# Patient Record
Sex: FEMALE | Race: WHITE | NOT HISPANIC OR LATINO | Employment: UNEMPLOYED | URBAN - NONMETROPOLITAN AREA
[De-identification: names, ages, dates, MRNs, and addresses within clinical notes are randomized per-mention and may not be internally consistent; named-entity substitution may affect disease eponyms.]

---

## 2018-06-29 ENCOUNTER — OFFICE VISIT (OUTPATIENT)
Dept: INTERNAL MEDICINE | Facility: CLINIC | Age: 23
End: 2018-06-29

## 2018-06-29 VITALS
WEIGHT: 205 LBS | TEMPERATURE: 98.6 F | SYSTOLIC BLOOD PRESSURE: 118 MMHG | OXYGEN SATURATION: 99 % | RESPIRATION RATE: 12 BRPM | DIASTOLIC BLOOD PRESSURE: 70 MMHG | HEIGHT: 62 IN | BODY MASS INDEX: 37.73 KG/M2 | HEART RATE: 70 BPM

## 2018-06-29 DIAGNOSIS — Z82.61 FAMILY HISTORY OF RHEUMATOID ARTHRITIS: ICD-10-CM

## 2018-06-29 DIAGNOSIS — N92.6 ABNORMAL MENSES: ICD-10-CM

## 2018-06-29 DIAGNOSIS — L40.9 PSORIASIS: ICD-10-CM

## 2018-06-29 DIAGNOSIS — Z83.49: ICD-10-CM

## 2018-06-29 DIAGNOSIS — R14.0 BLOATING: ICD-10-CM

## 2018-06-29 DIAGNOSIS — E28.2 PCOS (POLYCYSTIC OVARIAN SYNDROME): ICD-10-CM

## 2018-06-29 DIAGNOSIS — R63.5 WEIGHT GAIN: Primary | ICD-10-CM

## 2018-06-29 DIAGNOSIS — M79.10 MYALGIA: ICD-10-CM

## 2018-06-29 DIAGNOSIS — R53.83 FATIGUE, UNSPECIFIED TYPE: ICD-10-CM

## 2018-06-29 PROCEDURE — 99204 OFFICE O/P NEW MOD 45 MIN: CPT | Performed by: FAMILY MEDICINE

## 2018-07-02 LAB
25(OH)D3+25(OH)D2 SERPL-MCNC: 37.5 NG/ML
ALBUMIN SERPL-MCNC: 4.7 G/DL (ref 3.5–5)
ALBUMIN/GLOB SERPL: 1.6 G/DL (ref 1–2)
ALP SERPL-CCNC: 84 U/L (ref 38–126)
ALT SERPL-CCNC: 33 U/L (ref 13–69)
AST SERPL-CCNC: 23 U/L (ref 15–46)
BASOPHILS # BLD AUTO: 0.04 10*3/MM3 (ref 0–0.2)
BASOPHILS NFR BLD AUTO: 0.3 % (ref 0–2.5)
BILIRUB SERPL-MCNC: 0.5 MG/DL (ref 0.2–1.3)
BUN SERPL-MCNC: 22 MG/DL (ref 7–20)
BUN/CREAT SERPL: 36.7 (ref 7.1–23.5)
CALCIUM SERPL-MCNC: 10 MG/DL (ref 8.4–10.2)
CENTROMERE B AB SER-ACNC: <0.2 AI (ref 0–0.9)
CHLORIDE SERPL-SCNC: 103 MMOL/L (ref 98–107)
CHROMATIN AB SERPL-ACNC: <0.2 AI (ref 0–0.9)
CO2 SERPL-SCNC: 29 MMOL/L (ref 26–30)
CREAT SERPL-MCNC: 0.6 MG/DL (ref 0.6–1.3)
CRP SERPL-MCNC: <0.5 MG/DL (ref 0–1)
DSDNA AB SER-ACNC: <1 IU/ML (ref 0–9)
ENA JO1 AB SER-ACNC: <0.2 AI (ref 0–0.9)
ENA RNP AB SER-ACNC: 0.3 AI (ref 0–0.9)
ENA SCL70 AB SER-ACNC: <0.2 AI (ref 0–0.9)
ENA SM AB SER-ACNC: <0.2 AI (ref 0–0.9)
ENA SM+RNP AB SER-ACNC: <0.2 AI (ref 0–0.9)
ENA SS-A AB SER-ACNC: <0.2 AI (ref 0–0.9)
ENA SS-B AB SER-ACNC: <0.2 AI (ref 0–0.9)
EOSINOPHIL # BLD AUTO: 0.43 10*3/MM3 (ref 0–0.7)
EOSINOPHIL NFR BLD AUTO: 3.4 % (ref 0–7)
ERYTHROCYTE [DISTWIDTH] IN BLOOD BY AUTOMATED COUNT: 12.4 % (ref 11.5–14.5)
ERYTHROCYTE [SEDIMENTATION RATE] IN BLOOD BY WESTERGREN METHOD: 5 MM/HR (ref 0–20)
FOLATE SERPL-MCNC: 10.9 NG/ML
GLOBULIN SER CALC-MCNC: 3 GM/DL
GLUCOSE SERPL-MCNC: 90 MG/DL (ref 74–98)
HBA1C MFR BLD: 5.4 %
HCT VFR BLD AUTO: 41 % (ref 37–47)
HGB BLD-MCNC: 14 G/DL (ref 12–16)
IMM GRANULOCYTES # BLD: 0.05 10*3/MM3 (ref 0–0.06)
IMM GRANULOCYTES NFR BLD: 0.4 % (ref 0–0.6)
IRON SATN MFR SERPL: 19 % (ref 11–46)
IRON SERPL-MCNC: 83 MCG/DL (ref 37–181)
LYMPHOCYTES # BLD AUTO: 3.06 10*3/MM3 (ref 0.6–3.4)
LYMPHOCYTES NFR BLD AUTO: 24 % (ref 10–50)
Lab: NORMAL
MCH RBC QN AUTO: 30.8 PG (ref 27–31)
MCHC RBC AUTO-ENTMCNC: 34.1 G/DL (ref 30–37)
MCV RBC AUTO: 90.1 FL (ref 81–99)
MONOCYTES # BLD AUTO: 0.73 10*3/MM3 (ref 0–0.9)
MONOCYTES NFR BLD AUTO: 5.7 % (ref 0–12)
NEUTROPHILS # BLD AUTO: 8.45 10*3/MM3 (ref 2–6.9)
NEUTROPHILS NFR BLD AUTO: 66.2 % (ref 37–80)
NRBC BLD AUTO-RTO: 0 /100 WBC (ref 0–0)
PLATELET # BLD AUTO: 291 10*3/MM3 (ref 130–400)
POTASSIUM SERPL-SCNC: 4.7 MMOL/L (ref 3.5–5.1)
PROT SERPL-MCNC: 7.7 G/DL (ref 6.3–8.2)
RBC # BLD AUTO: 4.55 10*6/MM3 (ref 4.2–5.4)
RHEUMATOID FACT SERPL-ACNC: <10 IU/ML (ref 0–13.9)
RIBOSOMAL P AB SER-ACNC: <0.2 AI (ref 0–0.9)
SODIUM SERPL-SCNC: 141 MMOL/L (ref 137–145)
T4 FREE SERPL-MCNC: 0.94 NG/DL (ref 0.78–2.19)
TESTOST SERPL-MCNC: 39.8 NG/DL (ref 10–55)
THYROGLOB AB SERPL-ACNC: <1 IU/ML (ref 0–0.9)
THYROPEROXIDASE AB SERPL-ACNC: 15 IU/ML (ref 0–34)
TIBC SERPL-MCNC: 428 MCG/DL (ref 261–497)
TSH SERPL DL<=0.005 MIU/L-ACNC: 0.94 MIU/ML (ref 0.47–4.68)
UIBC SERPL-MCNC: 345 MCG/DL
VIT B12 SERPL-MCNC: 359 PG/ML (ref 239–931)
WBC # BLD AUTO: 12.76 10*3/MM3 (ref 4.8–10.8)

## 2018-07-05 ENCOUNTER — TELEPHONE (OUTPATIENT)
Dept: INTERNAL MEDICINE | Facility: CLINIC | Age: 23
End: 2018-07-05

## 2018-07-05 DIAGNOSIS — L40.9 PSORIASIS: Primary | ICD-10-CM

## 2018-07-05 DIAGNOSIS — M79.10 MYALGIA: ICD-10-CM

## 2018-07-05 DIAGNOSIS — Z82.61 FAMILY HISTORY OF RHEUMATOID ARTHRITIS: ICD-10-CM

## 2019-10-28 LAB — EXTERNAL GROUP B STREP ANTIGEN: NEGATIVE

## 2019-11-13 ENCOUNTER — HOSPITAL ENCOUNTER (OUTPATIENT)
Facility: HOSPITAL | Age: 24
Setting detail: OBSERVATION
Discharge: HOME | End: 2019-11-14
Attending: OBSTETRICS & GYNECOLOGY | Admitting: NURSE PRACTITIONER

## 2019-11-13 PROBLEM — Z34.90 PATIENT CURRENTLY PREGNANT: Status: ACTIVE | Noted: 2019-11-13

## 2019-11-13 LAB
ABO GROUP BLD: NORMAL
BLD GP AB SCN SERPL QL: NEGATIVE
DEPRECATED RDW RBC AUTO: 46.4 FL (ref 37–54)
ERYTHROCYTE [DISTWIDTH] IN BLOOD BY AUTOMATED COUNT: 13.8 % (ref 12.3–15.4)
HCT VFR BLD AUTO: 37.3 % (ref 34–46.6)
HGB BLD-MCNC: 12.4 G/DL (ref 12–15.9)
MCH RBC QN AUTO: 30.5 PG (ref 26.6–33)
MCHC RBC AUTO-ENTMCNC: 33.2 G/DL (ref 31.5–35.7)
MCV RBC AUTO: 91.9 FL (ref 79–97)
PLATELET # BLD AUTO: 257 10*3/MM3 (ref 140–450)
PMV BLD AUTO: 10.6 FL (ref 6–12)
RBC # BLD AUTO: 4.06 10*6/MM3 (ref 3.77–5.28)
RH BLD: POSITIVE
T&S EXPIRATION DATE: NORMAL
WBC NRBC COR # BLD: 12.79 10*3/MM3 (ref 3.4–10.8)

## 2019-11-13 PROCEDURE — 86901 BLOOD TYPING SEROLOGIC RH(D): CPT

## 2019-11-13 PROCEDURE — G0378 HOSPITAL OBSERVATION PER HR: HCPCS

## 2019-11-13 PROCEDURE — 59025 FETAL NON-STRESS TEST: CPT

## 2019-11-13 PROCEDURE — 86850 RBC ANTIBODY SCREEN: CPT | Performed by: NURSE PRACTITIONER

## 2019-11-13 PROCEDURE — 96361 HYDRATE IV INFUSION ADD-ON: CPT

## 2019-11-13 PROCEDURE — 86901 BLOOD TYPING SEROLOGIC RH(D): CPT | Performed by: NURSE PRACTITIONER

## 2019-11-13 PROCEDURE — 85027 COMPLETE CBC AUTOMATED: CPT | Performed by: NURSE PRACTITIONER

## 2019-11-13 PROCEDURE — 86900 BLOOD TYPING SEROLOGIC ABO: CPT | Performed by: NURSE PRACTITIONER

## 2019-11-13 PROCEDURE — 86900 BLOOD TYPING SEROLOGIC ABO: CPT

## 2019-11-13 RX ORDER — CARBOPROST TROMETHAMINE 250 UG/ML
250 INJECTION, SOLUTION INTRAMUSCULAR AS NEEDED
Status: CANCELLED | OUTPATIENT
Start: 2019-11-13

## 2019-11-13 RX ORDER — PROMETHAZINE HYDROCHLORIDE 25 MG/ML
12.5 INJECTION, SOLUTION INTRAMUSCULAR; INTRAVENOUS EVERY 6 HOURS PRN
Status: DISCONTINUED | OUTPATIENT
Start: 2019-11-13 | End: 2019-11-14 | Stop reason: HOSPADM

## 2019-11-13 RX ORDER — OXYTOCIN-SODIUM CHLORIDE 0.9% IV SOLN 30 UNIT/500ML 30-0.9/5 UT/ML-%
85 SOLUTION INTRAVENOUS ONCE
Status: CANCELLED | OUTPATIENT
Start: 2019-11-13 | End: 2019-11-13

## 2019-11-13 RX ORDER — ONDANSETRON 4 MG/1
4 TABLET, FILM COATED ORAL EVERY 6 HOURS PRN
Status: DISCONTINUED | OUTPATIENT
Start: 2019-11-13 | End: 2019-11-14 | Stop reason: HOSPADM

## 2019-11-13 RX ORDER — MAGNESIUM CARB/ALUMINUM HYDROX 105-160MG
30 TABLET,CHEWABLE ORAL ONCE
Status: DISCONTINUED | OUTPATIENT
Start: 2019-11-13 | End: 2019-11-14 | Stop reason: HOSPADM

## 2019-11-13 RX ORDER — PRENATAL WITH FERROUS FUM AND FOLIC ACID 3080; 920; 120; 400; 22; 1.84; 3; 20; 10; 1; 12; 200; 27; 25; 2 [IU]/1; [IU]/1; MG/1; [IU]/1; MG/1; MG/1; MG/1; MG/1; MG/1; MG/1; UG/1; MG/1; MG/1; MG/1; MG/1
TABLET ORAL DAILY
COMMUNITY
End: 2021-04-27

## 2019-11-13 RX ORDER — OXYTOCIN-SODIUM CHLORIDE 0.9% IV SOLN 30 UNIT/500ML 30-0.9/5 UT/ML-%
650 SOLUTION INTRAVENOUS ONCE
Status: CANCELLED | OUTPATIENT
Start: 2019-11-13 | End: 2019-11-13

## 2019-11-13 RX ORDER — PROMETHAZINE HYDROCHLORIDE 12.5 MG/1
12.5 TABLET ORAL EVERY 6 HOURS PRN
Status: DISCONTINUED | OUTPATIENT
Start: 2019-11-13 | End: 2019-11-14 | Stop reason: HOSPADM

## 2019-11-13 RX ORDER — PROMETHAZINE HYDROCHLORIDE 12.5 MG/1
12.5 SUPPOSITORY RECTAL EVERY 6 HOURS PRN
Status: DISCONTINUED | OUTPATIENT
Start: 2019-11-13 | End: 2019-11-14 | Stop reason: HOSPADM

## 2019-11-13 RX ORDER — ONDANSETRON 2 MG/ML
4 INJECTION INTRAMUSCULAR; INTRAVENOUS EVERY 6 HOURS PRN
Status: DISCONTINUED | OUTPATIENT
Start: 2019-11-13 | End: 2019-11-14 | Stop reason: HOSPADM

## 2019-11-13 RX ORDER — ACETAMINOPHEN 325 MG/1
650 TABLET ORAL EVERY 4 HOURS PRN
Status: DISCONTINUED | OUTPATIENT
Start: 2019-11-13 | End: 2019-11-14 | Stop reason: HOSPADM

## 2019-11-13 RX ORDER — MISOPROSTOL 200 UG/1
800 TABLET ORAL AS NEEDED
Status: CANCELLED | OUTPATIENT
Start: 2019-11-13

## 2019-11-13 RX ORDER — BUTORPHANOL TARTRATE 1 MG/ML
1 INJECTION, SOLUTION INTRAMUSCULAR; INTRAVENOUS
Status: DISCONTINUED | OUTPATIENT
Start: 2019-11-13 | End: 2019-11-14 | Stop reason: HOSPADM

## 2019-11-13 RX ORDER — SODIUM CHLORIDE 0.9 % (FLUSH) 0.9 %
10 SYRINGE (ML) INJECTION AS NEEDED
Status: DISCONTINUED | OUTPATIENT
Start: 2019-11-13 | End: 2019-11-14 | Stop reason: HOSPADM

## 2019-11-13 RX ORDER — SODIUM CHLORIDE 0.9 % (FLUSH) 0.9 %
3 SYRINGE (ML) INJECTION EVERY 12 HOURS SCHEDULED
Status: DISCONTINUED | OUTPATIENT
Start: 2019-11-13 | End: 2019-11-14 | Stop reason: HOSPADM

## 2019-11-13 RX ORDER — IBUPROFEN 600 MG/1
600 TABLET ORAL EVERY 6 HOURS PRN
Status: CANCELLED | OUTPATIENT
Start: 2019-11-13

## 2019-11-13 RX ORDER — METHYLERGONOVINE MALEATE 0.2 MG/ML
200 INJECTION INTRAVENOUS ONCE AS NEEDED
Status: CANCELLED | OUTPATIENT
Start: 2019-11-13

## 2019-11-13 RX ORDER — LIDOCAINE HYDROCHLORIDE 10 MG/ML
5 INJECTION, SOLUTION EPIDURAL; INFILTRATION; INTRACAUDAL; PERINEURAL AS NEEDED
Status: DISCONTINUED | OUTPATIENT
Start: 2019-11-13 | End: 2019-11-14 | Stop reason: HOSPADM

## 2019-11-13 RX ORDER — SODIUM CHLORIDE, SODIUM LACTATE, POTASSIUM CHLORIDE, CALCIUM CHLORIDE 600; 310; 30; 20 MG/100ML; MG/100ML; MG/100ML; MG/100ML
125 INJECTION, SOLUTION INTRAVENOUS CONTINUOUS
Status: DISCONTINUED | OUTPATIENT
Start: 2019-11-13 | End: 2019-11-14 | Stop reason: HOSPADM

## 2019-11-13 RX ORDER — TERBUTALINE SULFATE 1 MG/ML
0.25 INJECTION, SOLUTION SUBCUTANEOUS AS NEEDED
Status: DISCONTINUED | OUTPATIENT
Start: 2019-11-13 | End: 2019-11-14 | Stop reason: HOSPADM

## 2019-11-13 RX ADMIN — SODIUM CHLORIDE, POTASSIUM CHLORIDE, SODIUM LACTATE AND CALCIUM CHLORIDE 125 ML/HR: 600; 310; 30; 20 INJECTION, SOLUTION INTRAVENOUS at 18:31

## 2019-11-14 ENCOUNTER — HOSPITAL ENCOUNTER (OUTPATIENT)
Facility: HOSPITAL | Age: 24
End: 2019-11-14
Attending: OBSTETRICS & GYNECOLOGY | Admitting: OBSTETRICS & GYNECOLOGY

## 2019-11-14 VITALS
WEIGHT: 240 LBS | RESPIRATION RATE: 16 BRPM | TEMPERATURE: 98 F | SYSTOLIC BLOOD PRESSURE: 101 MMHG | BODY MASS INDEX: 44.16 KG/M2 | HEART RATE: 100 BPM | HEIGHT: 62 IN | DIASTOLIC BLOOD PRESSURE: 53 MMHG

## 2019-11-14 PROBLEM — Z34.90 PREGNANCY: Status: ACTIVE | Noted: 2019-11-14

## 2019-11-14 PROCEDURE — 96361 HYDRATE IV INFUSION ADD-ON: CPT

## 2019-11-14 PROCEDURE — 96374 THER/PROPH/DIAG INJ IV PUSH: CPT

## 2019-11-14 PROCEDURE — G0378 HOSPITAL OBSERVATION PER HR: HCPCS

## 2019-11-14 PROCEDURE — 59025 FETAL NON-STRESS TEST: CPT

## 2019-11-14 PROCEDURE — 96372 THER/PROPH/DIAG INJ SC/IM: CPT

## 2019-11-14 PROCEDURE — 25010000002 MORPHINE PER 10 MG: Performed by: NURSE PRACTITIONER

## 2019-11-14 PROCEDURE — 25010000002 PROMETHAZINE PER 50 MG: Performed by: NURSE PRACTITIONER

## 2019-11-14 RX ORDER — PROMETHAZINE HYDROCHLORIDE 25 MG/ML
12.5 INJECTION, SOLUTION INTRAMUSCULAR; INTRAVENOUS EVERY 6 HOURS PRN
Status: DISCONTINUED | OUTPATIENT
Start: 2019-11-14 | End: 2019-11-14 | Stop reason: SDUPTHER

## 2019-11-14 RX ORDER — MORPHINE SULFATE 10 MG/ML
10 INJECTION INTRAMUSCULAR; INTRAVENOUS; SUBCUTANEOUS ONCE
Status: COMPLETED | OUTPATIENT
Start: 2019-11-14 | End: 2019-11-14

## 2019-11-14 RX ADMIN — PROMETHAZINE HYDROCHLORIDE 12.5 MG: 25 INJECTION INTRAMUSCULAR; INTRAVENOUS at 01:09

## 2019-11-14 RX ADMIN — MORPHINE SULFATE 10 MG: 10 INJECTION INTRAVENOUS at 01:41

## 2019-11-14 RX ADMIN — SODIUM CHLORIDE, POTASSIUM CHLORIDE, SODIUM LACTATE AND CALCIUM CHLORIDE 125 ML/HR: 600; 310; 30; 20 INJECTION, SOLUTION INTRAVENOUS at 01:50

## 2019-11-14 NOTE — PROGRESS NOTES
[facility]  Obstetric Progress Note    Subjective     Patient:    Resting without complaints  States that contractions have decreased in frequency and intensity  Objective     Vital Signs Range for the last 24 hours  Temp:  [97.8 °F (36.6 °C)-97.9 °F (36.6 °C)] 97.8 °F (36.6 °C)   Temp src: Oral   BP: (103-122)/(68-77) 122/74   Heart Rate:  [71-88] 75   Resp:  [18-20] 18               Weight:  [109 kg (240 lb)] 109 kg (240 lb)       Intake/Output this shift:    No intake/output data recorded.    Physical Exam:      Abdomen Abdominal exam: soft, nontender, nondistended, no masses or organomegaly.   Extremities Exam of extremities: peripheral pulses normal, no pedal edema, no clubbing or cyanosis     Presentation: vertex   Cervix: Exam by: Method: sterile exam per CNM   Dilation:   3   Effacement: Cervical Effacement: 80%   Station:   -2, [facility]         Fetal Heart Rate Assessment   Method: Fetal HR Assessment Method: external   Beats/min: Fetal HR (beats/min): 120   Baseline: Fetal Heart Baseline Rate: normal range   Varibility: Fetal HR Variability: moderate (amplitude range 6 to 25 bpm)   Accels: Fetal HR Accelerations: greater than/equal to 15 bpm   Decels: Fetal HR Decelerations: absent   Tracing Category:       Uterine Assessment   Method: Method: external tocotransducer   Frequency (min): Contraction Frequency (Minutes): 8-9   Ctx Count in 10 min:     Duration:     Intensity: Contraction Intensity: mild by palpation   Intensity by IUPC:     Resting Tone: Uterine Resting Tone: soft by palpation   Resting Tone by IUPC:     [place] Units:         Assessment/Plan       Patient currently pregnant        Assessment:  1.  Intrauterine pregnancy at 38w5d weeks gestation with reactive fetal status.    2.  Early labor versus false labor  3.  Obstetrical history significant for previous  section.  4.  GBS status: No results found for: GBSANTIGEN    Plan:  1. Discussed that since her contractions have decreased  and since she has not had cervical change since admission then augmentation is not indicated prior to 39 weeks.  Will continue observation  2.  Offered therapeutic rest  3.   Plan of care has been reviewed with patient and she verbalizes understanding  4.  Risks, benefits of treatment plan have been discussed.  5.  All questions have been answered.        Rosita Taylor CNM  11/14/2019  1:05 AM

## 2019-11-14 NOTE — H&P
[facility]  Obstetric History and Physical    Chief Complaint   Patient presents with   • Contractions       Subjective     Patient is a 24 y.o. female  currently at 38w4d, who presents with labor.    Her prenatal care is benign.  Her previous obstetric/gynecological history is noted for previous  for placental abruption at 34 weeks.  She has had two uncomplicated VBACs in 2016 since  in      The following portions of the patients history were reviewed and updated as appropriate: current medications, allergies, past medical history, past surgical history, past family history, past social history and problem list .       Prenatal Information:  Prenatal Results     Initial Prenatal Labs     Test Value Reference Range Date Time    Hemoglobin        Hematocrit        Platelets 257 10*3/mm3 140 - 450 10*3/mm3 19    Rubella IgG        Hepatitis B SAg        Hepatitis C Ab        RPR        ABO O   19    Rh Positive   19    Antibody Screen        HIV        Urine Culture        Gonorrhea        Chlamydia        TSH 0.937 mIU/mL 0.470 - 4.680 mIU/mL 18 1120          2nd and 3rd Trimester     Test Value Reference Range Date Time    Hemoglobin (repeated) 12.4 g/dL 12.0 - 15.9 g/dL 19    Hematocrit (repeated) 37.3 % 34.0 - 46.6 % 19    GCT        Antibody Screen (repeated) Negative   19    GTT Fasting        GTT 1 Hr        GTT 2 Hr        GTT 3 Hr        Group B Strep Negative   10/28/19           Drug Screening     Test Value Reference Range Date Time    Amphetamine Screen        Barbiturate Screen        Benzodiazepine Screen        Methadone Screen        Phencyclidine Screen        Opiates Screen        THC Screen        Cocaine Screen        Propoxyphene Screen        Buprenorphine Screen        Methamphetamine Screen        Oxycodone Screen        Tricyclic Antidepressants Screen              Other (Risk screening)      Test Value Reference Range Date Time    Varicella IgG        Parvovirus IgG        CMV IgG        Cystic Fibrosis        Hemoglobin electrophoresis        NIPT        MSAFP-4        AFP (for NTD only)                  External Prenatal Results     Pregnancy Outside Results - Transcribed From Office Records - See Scanned Records For Details     Test Value Date Time    Hgb 12.4 g/dL 19 184    Hct 37.3 % 19    ABO O  19    Rh Positive  19    Antibody Screen Negative  19    Glucose Fasting GTT       Glucose Tolerance Test 1 hour       Glucose Tolerance Test 3 hour       Gonorrhea (discrete)       Chlamydia (discrete)       RPR       VDRL       Syphilis Antibody       Rubella       HBsAg       Herpes Simplex Virus PCR       Herpes Simplex VIrus Culture       HIV       Hep C RNA Quant PCR       Hep C Antibody       AFP       Group B Strep Negative  10/28/19     GBS Susceptibility to Clindamycin       GBS Susceptibility to Erythromycin       Fetal Fibronectin       Genetic Testing, Maternal Blood             Drug Screening     Test Value Date Time    Urine Drug Screen       Amphetamine Screen       Barbiturate Screen       Benzodiazepine Screen       Methadone Screen       Phencyclidine Screen       Opiates Screen       THC Screen       Cocaine Screen       Propoxyphene Screen       Buprenorphine Screen       Methamphetamine Screen       Oxycodone Screen       Tricyclic Antidepressants Screen                    Past OB History:     Obstetric History       T2      L3     SAB1   TAB0   Ectopic0   Molar0   Multiple0   Live Births3       # Outcome Date GA Lbr Aniceto/2nd Weight Sex Delivery Anes PTL Lv   9 Current            8 AB 18 8w0d          7 Term 16 37w0d   F    BAY   6 Term 16 40w0d   M    BAY   5 AB 01/01/15 8w0d          4  14 34w0d   M CS-LTranv   BAY   3 AB 14 8w0d          2 AB 13 8w0d          1 SAB  11 20w0d    Vag-Spont   ND          Past Medical History: Past Medical History:   Diagnosis Date   • Gestational diabetes     previous pregnancy   • Obesity    • PCOS (polycystic ovarian syndrome)       Past Surgical History Past Surgical History:   Procedure Laterality Date   •  SECTION  2014   • CHOLECYSTECTOMY  01/15/2018   • TONSILLECTOMY        Family History: History reviewed. No pertinent family history.   Social History:  reports that she has been smoking.  She has never used smokeless tobacco.   reports that she does not drink alcohol.   reports that she does not use drugs.        Review of Systems   Constitutional: Negative.    HENT: Negative.    Eyes: Negative.    Respiratory: Negative.    Cardiovascular: Negative.    Gastrointestinal: Negative.    Endocrine: Negative.    Genitourinary: Negative.    Musculoskeletal: Negative.    Skin: Negative.    Allergic/Immunologic: Negative.    Neurological: Negative.    Hematological: Negative.    Psychiatric/Behavioral: Negative.          Objective     Vital Signs Range for the last 24 hours  Temperature: Temp:  [97.8 °F (36.6 °C)-97.9 °F (36.6 °C)] 97.8 °F (36.6 °C)   Temp Source: Temp src: Oral   BP: BP: (103-113)/(68-77) 113/77   Pulse: Heart Rate:  [71-88] 71   Respirations: Resp:  [20] 20   SPO2:     O2 Amount (l/min):     O2 Devices     Weight: Weight:  [109 kg (240 lb)] 109 kg (240 lb)     Physical Examination: General appearance - alert, well appearing, and in no distress  Neck - supple, no significant adenopathy  Chest - clear to auscultation, no wheezes, rales or rhonchi, symmetric air entry  Heart - normal rate, regular rhythm, normal S1, S2, no murmurs, rubs, clicks or gallops  Abdomen - soft, nontender, nondistended, no masses or organomegaly  Extremities - peripheral pulses normal, no pedal edema, no clubbing or cyanosis    Presentation: vertex   Cervix: Exam by:   RN   Dilation: Cervical Dilation (cm): 3   Effacement: Cervical  Effacement: 80-90%   Station:       Fetal Heart Rate Assessment   Method: Fetal HR Assessment Method: external   Beats/min: Fetal HR (beats/min): 120   Baseline: Fetal Heart Baseline Rate: normal range   Variability: Fetal HR Variability: moderate (amplitude range 6 to 25 bpm)   Accels: Fetal HR Accelerations: greater than/equal to 15 bpm   Decels: Fetal HR Decelerations: absent   Tracing Category:       Uterine Assessment   Method: Method: external tocotransducer   Frequency (min): Contraction Frequency (Minutes): 3-6   Ctx Count in 10 min:     Duration:     Intensity: Contraction Intensity: moderate by palpation   Intensity by IUPC:     Resting Tone: Uterine Resting Tone: soft by palpation   Resting Tone by IUPC:     [place] Units:         Assessment/Plan       Patient currently pregnant      Assessment & Plan    Assessment:  1.  Intrauterine pregnancy at 38w4d gestation with reactive fetal status.    2.  labor  without ROM  3.  Obstetrical history significant for previous  section with successful  x 2  4.  GBS status:   External Strep Group B Ag   Date Value Ref Range Status   10/28/2019 Negative  Final       Plan:  1. expectant management. Patient refuses augmentation until after midnight at which point she desires AROM  2. Plan of care has been reviewed with patient and she verbalizes understanding  3.  Risks, benefits of treatment plan have been discussed.  4.  All questions have been answered.  5.  Epidural as desires      Rosita Taylor CNM  2019  11:12 PM

## 2019-11-14 NOTE — PROGRESS NOTES
[facility]  Obstetric Progress Note    Subjective     Patient:    Patient is frustrated as contractions have spaced out. Denies vaginal bleeding or loss of fluid. Reports good fetal movement.     Objective     Vital Signs Range for the last 24 hours  Temp:  [97.8 °F (36.6 °C)-98 °F (36.7 °C)] 98 °F (36.7 °C)   Temp src: Oral   BP: (101-122)/(53-77) 101/53   Heart Rate:  [] 100   Resp:  [16-20] 16               Weight:  [109 kg (240 lb)] 109 kg (240 lb)       Intake/Output this shift:    No intake/output data recorded.    Physical Exam:      Abdomen Abdominal exam: soft, nontender, nondistended, no masses or organomegaly.   Extremities Exam of extremities: peripheral pulses normal, no pedal edema, no clubbing or cyanosis     Presentation: vertex   Cervix: Exam by: Method: sterile exam per CNM   Dilation:  3   Effacement: Cervical Effacement: 80%   Station:  -1         Fetal Heart Rate Assessment   Method: Fetal HR Assessment Method: external   Beats/min: Fetal HR (beats/min): 130   Baseline: Fetal Heart Baseline Rate: normal range   Varibility: Fetal HR Variability: moderate (amplitude range 6 to 25 bpm)   Accels: Fetal HR Accelerations: greater than/equal to 15 bpm, lasting at least 15 seconds   Decels: Fetal HR Decelerations: variable   Tracing Category:       Uterine Assessment   Method: Method: external tocotransducer   Frequency (min): Contraction Frequency (Minutes): x2   Ctx Count in 10 min:     Duration:     Intensity: Contraction Intensity: no contractions   Intensity by IUPC:     Resting Tone: Uterine Resting Tone: soft by palpation   Resting Tone by IUPC:     [place] Units:         Assessment/Plan       Patient currently pregnant        Assessment:  1.  Intrauterine pregnancy at 38w5d weeks gestation with reactive fetal status.    2.  Contractions have spaced out overnight, patient now feeling rare contractions    Plan:  1. Discharge home with labor precautions, keep scheduled appt for Monday or  rtc sooner if indicated  2.   Plan of care has been reviewed with patient and family  3.  Risks, benefits of treatment plan have been discussed.  4.  All questions have been answered.        Thao Robin CNM  11/14/2019  8:40 AM

## 2019-11-14 NOTE — PLAN OF CARE
Problem: Labor (Cervical Ripen, Induct, Augment) (Adult,Obstetrics,Pediatric)  Goal: Signs and Symptoms of Listed Potential Problems Will be Absent, Minimized or Managed (Labor)  Outcome: Ongoing (interventions implemented as appropriate)   11/14/19 7445   Goal/Outcome Evaluation   Problems Assessed (Labor) all   Problems Present (Labor) none

## 2019-11-18 ENCOUNTER — HOSPITAL ENCOUNTER (INPATIENT)
Dept: LABOR AND DELIVERY | Facility: HOSPITAL | Age: 24
LOS: 3 days | Discharge: HOME | End: 2019-11-21
Attending: OBSTETRICS & GYNECOLOGY | Admitting: OBSTETRICS & GYNECOLOGY

## 2019-11-18 PROBLEM — O34.219 HX SUCCESSFUL VBAC (VAGINAL BIRTH AFTER CESAREAN), CURRENTLY PREGNANT: Status: ACTIVE | Noted: 2019-11-18

## 2019-11-18 PROBLEM — Z3A.39 39 WEEKS GESTATION OF PREGNANCY: Status: ACTIVE | Noted: 2019-11-18

## 2019-11-18 PROBLEM — O34.219 DESIRES VBAC (VAGINAL BIRTH AFTER CESAREAN) TRIAL: Status: ACTIVE | Noted: 2019-11-18

## 2019-11-18 LAB
ALP SERPL-CCNC: 129 U/L (ref 39–117)
ALT SERPL W P-5'-P-CCNC: 10 U/L (ref 1–33)
AST SERPL-CCNC: 14 U/L (ref 1–32)
BILIRUB SERPL-MCNC: 0.2 MG/DL (ref 0.2–1.2)
CREAT BLD-MCNC: 0.53 MG/DL (ref 0.57–1)
DEPRECATED RDW RBC AUTO: 46.4 FL (ref 37–54)
ERYTHROCYTE [DISTWIDTH] IN BLOOD BY AUTOMATED COUNT: 13.8 % (ref 12.3–15.4)
HCT VFR BLD AUTO: 34.6 % (ref 34–46.6)
HGB BLD-MCNC: 11.4 G/DL (ref 12–15.9)
LDH SERPL-CCNC: 158 U/L (ref 135–214)
MCH RBC QN AUTO: 31 PG (ref 26.6–33)
MCHC RBC AUTO-ENTMCNC: 32.9 G/DL (ref 31.5–35.7)
MCV RBC AUTO: 94 FL (ref 79–97)
PLATELET # BLD AUTO: 249 10*3/MM3 (ref 140–450)
PMV BLD AUTO: 10.9 FL (ref 6–12)
RBC # BLD AUTO: 3.68 10*6/MM3 (ref 3.77–5.28)
URATE SERPL-MCNC: 6.6 MG/DL (ref 2.4–5.7)
WBC NRBC COR # BLD: 13.66 10*3/MM3 (ref 3.4–10.8)

## 2019-11-18 PROCEDURE — 59025 FETAL NON-STRESS TEST: CPT

## 2019-11-18 PROCEDURE — 82247 BILIRUBIN TOTAL: CPT | Performed by: OBSTETRICS & GYNECOLOGY

## 2019-11-18 PROCEDURE — 83615 LACTATE (LD) (LDH) ENZYME: CPT | Performed by: OBSTETRICS & GYNECOLOGY

## 2019-11-18 PROCEDURE — 86850 RBC ANTIBODY SCREEN: CPT | Performed by: OBSTETRICS & GYNECOLOGY

## 2019-11-18 PROCEDURE — 86900 BLOOD TYPING SEROLOGIC ABO: CPT | Performed by: OBSTETRICS & GYNECOLOGY

## 2019-11-18 PROCEDURE — 82565 ASSAY OF CREATININE: CPT | Performed by: OBSTETRICS & GYNECOLOGY

## 2019-11-18 PROCEDURE — 84450 TRANSFERASE (AST) (SGOT): CPT | Performed by: OBSTETRICS & GYNECOLOGY

## 2019-11-18 PROCEDURE — 84460 ALANINE AMINO (ALT) (SGPT): CPT | Performed by: OBSTETRICS & GYNECOLOGY

## 2019-11-18 PROCEDURE — 86901 BLOOD TYPING SEROLOGIC RH(D): CPT | Performed by: OBSTETRICS & GYNECOLOGY

## 2019-11-18 PROCEDURE — 84550 ASSAY OF BLOOD/URIC ACID: CPT | Performed by: OBSTETRICS & GYNECOLOGY

## 2019-11-18 PROCEDURE — 84075 ASSAY ALKALINE PHOSPHATASE: CPT | Performed by: OBSTETRICS & GYNECOLOGY

## 2019-11-18 PROCEDURE — 85027 COMPLETE CBC AUTOMATED: CPT | Performed by: OBSTETRICS & GYNECOLOGY

## 2019-11-18 RX ORDER — LIDOCAINE HYDROCHLORIDE 10 MG/ML
5 INJECTION, SOLUTION EPIDURAL; INFILTRATION; INTRACAUDAL; PERINEURAL AS NEEDED
Status: DISCONTINUED | OUTPATIENT
Start: 2019-11-18 | End: 2019-11-19 | Stop reason: HOSPADM

## 2019-11-18 RX ORDER — SODIUM CHLORIDE, SODIUM LACTATE, POTASSIUM CHLORIDE, CALCIUM CHLORIDE 600; 310; 30; 20 MG/100ML; MG/100ML; MG/100ML; MG/100ML
125 INJECTION, SOLUTION INTRAVENOUS CONTINUOUS
Status: DISCONTINUED | OUTPATIENT
Start: 2019-11-19 | End: 2019-11-19

## 2019-11-18 RX ORDER — OXYTOCIN-SODIUM CHLORIDE 0.9% IV SOLN 30 UNIT/500ML 30-0.9/5 UT/ML-%
2-30 SOLUTION INTRAVENOUS
Status: DISCONTINUED | OUTPATIENT
Start: 2019-11-19 | End: 2019-11-19 | Stop reason: HOSPADM

## 2019-11-18 RX ORDER — MAGNESIUM CARB/ALUMINUM HYDROX 105-160MG
30 TABLET,CHEWABLE ORAL ONCE
Status: DISCONTINUED | OUTPATIENT
Start: 2019-11-19 | End: 2019-11-19 | Stop reason: HOSPADM

## 2019-11-18 RX ORDER — BUTORPHANOL TARTRATE 1 MG/ML
1 INJECTION, SOLUTION INTRAMUSCULAR; INTRAVENOUS
Status: DISCONTINUED | OUTPATIENT
Start: 2019-11-18 | End: 2019-11-19 | Stop reason: HOSPADM

## 2019-11-18 RX ORDER — SODIUM CHLORIDE 0.9 % (FLUSH) 0.9 %
3 SYRINGE (ML) INJECTION EVERY 12 HOURS SCHEDULED
Status: DISCONTINUED | OUTPATIENT
Start: 2019-11-19 | End: 2019-11-19 | Stop reason: HOSPADM

## 2019-11-18 RX ORDER — SODIUM CHLORIDE 0.9 % (FLUSH) 0.9 %
1-10 SYRINGE (ML) INJECTION AS NEEDED
Status: DISCONTINUED | OUTPATIENT
Start: 2019-11-18 | End: 2019-11-19 | Stop reason: HOSPADM

## 2019-11-18 RX ADMIN — OXYTOCIN 2 MILLI-UNITS/MIN: 10 INJECTION, SOLUTION INTRAMUSCULAR; INTRAVENOUS at 23:54

## 2019-11-19 LAB
ABO GROUP BLD: NORMAL
BLD GP AB SCN SERPL QL: NEGATIVE
RH BLD: POSITIVE
T&S EXPIRATION DATE: NORMAL

## 2019-11-19 PROCEDURE — 59025 FETAL NON-STRESS TEST: CPT

## 2019-11-19 PROCEDURE — 3E033VJ INTRODUCTION OF OTHER HORMONE INTO PERIPHERAL VEIN, PERCUTANEOUS APPROACH: ICD-10-PCS | Performed by: OBSTETRICS & GYNECOLOGY

## 2019-11-19 PROCEDURE — 25010000002 BUTORPHANOL PER 1 MG: Performed by: ADVANCED PRACTICE MIDWIFE

## 2019-11-19 RX ORDER — ONDANSETRON 4 MG/1
4 TABLET, FILM COATED ORAL EVERY 6 HOURS PRN
Status: DISCONTINUED | OUTPATIENT
Start: 2019-11-19 | End: 2019-11-21 | Stop reason: HOSPADM

## 2019-11-19 RX ORDER — PROMETHAZINE HYDROCHLORIDE 12.5 MG/1
12.5 TABLET ORAL EVERY 4 HOURS PRN
Status: DISCONTINUED | OUTPATIENT
Start: 2019-11-19 | End: 2019-11-21 | Stop reason: HOSPADM

## 2019-11-19 RX ORDER — MISOPROSTOL 200 UG/1
800 TABLET ORAL AS NEEDED
Status: DISCONTINUED | OUTPATIENT
Start: 2019-11-19 | End: 2019-11-19 | Stop reason: HOSPADM

## 2019-11-19 RX ORDER — PROMETHAZINE HYDROCHLORIDE 25 MG/ML
12.5 INJECTION, SOLUTION INTRAMUSCULAR; INTRAVENOUS EVERY 4 HOURS PRN
Status: DISCONTINUED | OUTPATIENT
Start: 2019-11-19 | End: 2019-11-19

## 2019-11-19 RX ORDER — OXYTOCIN-SODIUM CHLORIDE 0.9% IV SOLN 30 UNIT/500ML 30-0.9/5 UT/ML-%
85 SOLUTION INTRAVENOUS ONCE
Status: DISCONTINUED | OUTPATIENT
Start: 2019-11-19 | End: 2019-11-19 | Stop reason: HOSPADM

## 2019-11-19 RX ORDER — ACETAMINOPHEN 325 MG/1
650 TABLET ORAL EVERY 4 HOURS PRN
Status: DISCONTINUED | OUTPATIENT
Start: 2019-11-19 | End: 2019-11-21 | Stop reason: HOSPADM

## 2019-11-19 RX ORDER — ONDANSETRON 2 MG/ML
4 INJECTION INTRAMUSCULAR; INTRAVENOUS EVERY 6 HOURS PRN
Status: DISCONTINUED | OUTPATIENT
Start: 2019-11-19 | End: 2019-11-19

## 2019-11-19 RX ORDER — DOCUSATE SODIUM 100 MG/1
100 CAPSULE, LIQUID FILLED ORAL 2 TIMES DAILY
Status: DISCONTINUED | OUTPATIENT
Start: 2019-11-19 | End: 2019-11-21 | Stop reason: HOSPADM

## 2019-11-19 RX ORDER — SIMETHICONE 80 MG
80 TABLET,CHEWABLE ORAL 4 TIMES DAILY PRN
Status: DISCONTINUED | OUTPATIENT
Start: 2019-11-19 | End: 2019-11-21 | Stop reason: HOSPADM

## 2019-11-19 RX ORDER — OXYTOCIN-SODIUM CHLORIDE 0.9% IV SOLN 30 UNIT/500ML 30-0.9/5 UT/ML-%
650 SOLUTION INTRAVENOUS ONCE
Status: COMPLETED | OUTPATIENT
Start: 2019-11-19 | End: 2019-11-19

## 2019-11-19 RX ORDER — IBUPROFEN 600 MG/1
600 TABLET ORAL EVERY 6 HOURS PRN
Status: DISCONTINUED | OUTPATIENT
Start: 2019-11-19 | End: 2019-11-21 | Stop reason: HOSPADM

## 2019-11-19 RX ORDER — SODIUM CHLORIDE 0.9 % (FLUSH) 0.9 %
1-10 SYRINGE (ML) INJECTION AS NEEDED
Status: DISCONTINUED | OUTPATIENT
Start: 2019-11-19 | End: 2019-11-19

## 2019-11-19 RX ORDER — CARBOPROST TROMETHAMINE 250 UG/ML
250 INJECTION, SOLUTION INTRAMUSCULAR AS NEEDED
Status: DISCONTINUED | OUTPATIENT
Start: 2019-11-19 | End: 2019-11-19 | Stop reason: HOSPADM

## 2019-11-19 RX ORDER — METHYLERGONOVINE MALEATE 0.2 MG/ML
200 INJECTION INTRAVENOUS ONCE AS NEEDED
Status: DISCONTINUED | OUTPATIENT
Start: 2019-11-19 | End: 2019-11-19 | Stop reason: HOSPADM

## 2019-11-19 RX ORDER — IBUPROFEN 600 MG/1
600 TABLET ORAL EVERY 6 HOURS PRN
Status: DISCONTINUED | OUTPATIENT
Start: 2019-11-19 | End: 2019-11-19 | Stop reason: HOSPADM

## 2019-11-19 RX ADMIN — Medication: at 13:30

## 2019-11-19 RX ADMIN — IBUPROFEN 600 MG: 600 TABLET, FILM COATED ORAL at 13:31

## 2019-11-19 RX ADMIN — IBUPROFEN 600 MG: 600 TABLET, FILM COATED ORAL at 20:18

## 2019-11-19 RX ADMIN — BUTORPHANOL TARTRATE 1 MG: 1 INJECTION, SOLUTION INTRAMUSCULAR; INTRAVENOUS at 10:05

## 2019-11-19 RX ADMIN — DOCUSATE SODIUM 100 MG: 100 CAPSULE, LIQUID FILLED ORAL at 20:18

## 2019-11-19 RX ADMIN — WITCH HAZEL 1 PAD: 500 SOLUTION RECTAL; TOPICAL at 13:31

## 2019-11-19 RX ADMIN — OXYTOCIN 650 ML/HR: 10 INJECTION INTRAVENOUS at 11:33

## 2019-11-19 NOTE — L&D DELIVERY NOTE
RO[facility]  Vaginal Delivery Note  11/19/19  11:40 AM      Delivery     Delivery: Vaginal, Spontaneous     YOB: 2020    Time of Birth: 11:27 AM      Anesthesia: None     Delivering clinician:   Melani               Delivery narrative:  Leona Murillo delivered a VMI from theOA position over a(an) Intact perineum. Upon may arrival, head was delivering and I directed the nurse in delivering shoulders and body which came very quickly.  Baby with vigorous cry on the perineum and placed on maternal abdomen.  Cord was doubly clamped and cut by FOB.  Cord blood and cord segment obtained.  Placenta delivered spontaneously, intact. EBL  250 mL.      Infant    Findings: male  infant, [place]     Infant observations: Weight: 2975 g (6 lb 8.9 oz)   Length: 19.75  in  Observations/Comments:         Apgars: 8   @ 1 minute /    9   @ 5 minutes         Placenta, Cord, and Fluid    Placenta delivered  Spontaneous  at  11/19/2019 11:34 AM     Cord:    present.   Nuchal Cord?  no   Cord blood obtained: Yes    Cord gases obtained:    no       Complications  none    Disposition  Mother to Mother Baby/Postpartum  in stable condition currently.  Baby to remains with mom  in stable condition currently.      Kena Polo MD  11/19/19  11:40 AM

## 2019-11-19 NOTE — PLAN OF CARE
Problem: Patient Care Overview  Goal: Plan of Care Review   11/19/19 0202   Coping/Psychosocial   Plan of Care Reviewed With patient;spouse       Problem: Labor (Cervical Ripen, Induct, Augment) (Adult,Obstetrics,Pediatric)  Intervention: Monitor/Manage Labor Pain   11/19/19 0202   Promote Oxygenation/Perfusion   Pain Management Interventions quiet environment facilitated     Intervention: Provide Emotional Support and Encouragement   11/19/19 0202   Interventions   Trust Relationship/Rapport care explained;choices provided;thoughts/feelings acknowledged;reassurance provided;questions encouraged;questions answered

## 2019-11-19 NOTE — H&P
[facility]  Obstetric History and Physical    Chief Complaint   Patient presents with   • Scheduled Induction       Subjective     Patient is a 24 y.o. female  currently at 39w2d, who presents for elective induction of labor with favorable cervix..    Her prenatal care is benign.  Her previous obstetric/gynecological history  is remarkable for  primary  section for fetal distress followed by 2 previous successful .    The following portions of the patients history were reviewed and updated as appropriate: current medications, allergies, past medical history, past surgical history, past family history, past social history and problem list .       Prenatal Information:           External Prenatal Results     Pregnancy Outside Results - Transcribed From Office Records - See Scanned Records For Details     Test Value Date Time    Hgb 12.4 g/dL 19    Hct 37.3 % 19    ABO O  19    Rh Positive  19    Antibody Screen Negative  19    Glucose Fasting GTT       Glucose Tolerance Test 1 hour 74      Glucose Tolerance Test 3 hour       Gonorrhea (discrete)       Chlamydia (discrete)       RPR NR      VDRL       Syphilis Antibody       Rubella       HBsAg       Herpes Simplex Virus PCR       Herpes Simplex VIrus Culture       HIV       Hep C RNA Quant PCR       Hep C Antibody       AFP       Group B Strep Negative  10/28/19     GBS Susceptibility to Clindamycin       GBS Susceptibility to Erythromycin       Fetal Fibronectin       Genetic Testing, Maternal Blood             Drug Screening     Test Value Date Time    Urine Drug Screen       Amphetamine Screen       Barbiturate Screen       Benzodiazepine Screen       Methadone Screen       Phencyclidine Screen       Opiates Screen       THC Screen       Cocaine Screen       Propoxyphene Screen       Buprenorphine Screen       Methamphetamine Screen       Oxycodone Screen       Tricyclic Antidepressants  Screen                     Past OB History:       Obstetric History       T2      L3     SAB1   TAB0   Ectopic0   Molar0   Multiple0   Live Births3       # Outcome Date GA Lbr Aniceto/2nd Weight Sex Delivery Anes PTL Lv   9 Current            8 AB 18 8w0d          7 Term 16 37w0d   F    BAY   6 Term 16 40w0d   M    BAY   5 AB 01/01/15 8w0d          4  14 34w0d   M CS-LTranv   BAY   3 AB 14 8w0d          2 AB 13 8w0d          1 SAB 11 20w0d    Vag-Spont   ND          Past Medical History: Past Medical History:   Diagnosis Date   • Gestational diabetes    • Obesity    • PCOS (polycystic ovarian syndrome)       Past Surgical History Past Surgical History:   Procedure Laterality Date   •  SECTION  2014   • CHOLECYSTECTOMY  01/15/2018   • TONSILLECTOMY        Family History: Family History   Problem Relation Age of Onset   • Diabetes Father    • Heart disease Father    • Diabetes Mother    • Hypertension Mother    • Heart disease Paternal Grandfather       Social History:  reports that she has never smoked. She has never used smokeless tobacco.   reports that she does not drink alcohol.   reports that she does not use drugs.        General ROS: Pertinent items are noted in HPI, all other systems reviewed and negative    Objective     Vitals:    19 2242   BP: 103/59   Pulse: 81   Resp: 18   Temp: 98.2 °F (36.8 °C)     Weight: Weight:  [109 kg (240 lb)] 109 kg (240 lb)     Physical Examination:   General Appearance: alert, well appearing, in no apparent distress  Lungs: clear to auscultation, no wheezes, rales or rhonchi, symmetric air entry  Heart: regular rate and rhythm, no murmurs  Abdomen: Gravid uterus, abdomen non tender, FHT present  Back exam: no CVA tenderness   Extremities: no redness or tenderness in the calves or thighs, no edema  Skin: normal coloration and turgor, no rashes      Presentation: Vertex   Cervix: Exam by:  Method: sterile exam per CNM   Dilation: Cervical Dilation (cm): 3   Effacement: Cervical Effacement: 80%   Station: Fetal Station: -2        Fetal Heart Rate Assessment   Method:  EFM   Beats/min:     Baseline:  130 baseline   Varibility:  average   Accels:  Present     Decels:  absent   Tracing Category:  1     Uterine Assessment   Method:  [place]   Frequency (min):  irregular   Ctx Count in 10 min:     Duration:     Intensity:  mild   Intensity by IUPC:     Resting Tone:  palpates soft   Resting Tone by IUPC:     [place] Units:       Laboratory Results: Pending        39 weeks gestation of pregnancy    Desires  (vaginal birth after ) trial    Hx successful  (vaginal birth after ), currently pregnant        Assessment:  1.  Intrauterine pregnancy at 39w2d weeks gestation with reactive, reassuring fetal status.    2.  labor  induction  3.  Obstetrical history is remarkable for  previous c/s and 2 succeeding   4.  GBS status: Negative    Plan:  1. fetal and uterine monitoring  continuously, cervical ripening with  low dose Pitocin, labor augmentation  Pitocin and analgesia with  parenteral narcotics and epidural  2. Plan of care has been reviewed with patient and   3. All questions have been answered.        Ngozi Slater CNM  2019  11:39 PM

## 2019-11-20 LAB
BASOPHILS # BLD AUTO: 0.05 10*3/MM3 (ref 0–0.2)
BASOPHILS NFR BLD AUTO: 0.4 % (ref 0–1.5)
DEPRECATED RDW RBC AUTO: 48.4 FL (ref 37–54)
EOSINOPHIL # BLD AUTO: 0.26 10*3/MM3 (ref 0–0.4)
EOSINOPHIL NFR BLD AUTO: 2.2 % (ref 0.3–6.2)
ERYTHROCYTE [DISTWIDTH] IN BLOOD BY AUTOMATED COUNT: 14 % (ref 12.3–15.4)
HCT VFR BLD AUTO: 30.2 % (ref 34–46.6)
HGB BLD-MCNC: 9.7 G/DL (ref 12–15.9)
IMM GRANULOCYTES # BLD AUTO: 0.07 10*3/MM3 (ref 0–0.05)
IMM GRANULOCYTES NFR BLD AUTO: 0.6 % (ref 0–0.5)
LYMPHOCYTES # BLD AUTO: 2.75 10*3/MM3 (ref 0.7–3.1)
LYMPHOCYTES NFR BLD AUTO: 22.8 % (ref 19.6–45.3)
MCH RBC QN AUTO: 31.1 PG (ref 26.6–33)
MCHC RBC AUTO-ENTMCNC: 32.1 G/DL (ref 31.5–35.7)
MCV RBC AUTO: 96.8 FL (ref 79–97)
MONOCYTES # BLD AUTO: 0.77 10*3/MM3 (ref 0.1–0.9)
MONOCYTES NFR BLD AUTO: 6.4 % (ref 5–12)
NEUTROPHILS # BLD AUTO: 8.18 10*3/MM3 (ref 1.7–7)
NEUTROPHILS NFR BLD AUTO: 67.6 % (ref 42.7–76)
NRBC BLD AUTO-RTO: 0 /100 WBC (ref 0–0.2)
PLAT MORPH BLD: NORMAL
PLATELET # BLD AUTO: 207 10*3/MM3 (ref 140–450)
PMV BLD AUTO: 10.6 FL (ref 6–12)
RBC # BLD AUTO: 3.12 10*6/MM3 (ref 3.77–5.28)
RBC MORPH BLD: NORMAL
WBC MORPH BLD: NORMAL
WBC NRBC COR # BLD: 12.08 10*3/MM3 (ref 3.4–10.8)

## 2019-11-20 PROCEDURE — 85025 COMPLETE CBC W/AUTO DIFF WBC: CPT | Performed by: OBSTETRICS & GYNECOLOGY

## 2019-11-20 PROCEDURE — 85007 BL SMEAR W/DIFF WBC COUNT: CPT | Performed by: OBSTETRICS & GYNECOLOGY

## 2019-11-20 RX ADMIN — IBUPROFEN 600 MG: 600 TABLET, FILM COATED ORAL at 13:58

## 2019-11-20 RX ADMIN — ACETAMINOPHEN 650 MG: 325 TABLET ORAL at 08:43

## 2019-11-20 RX ADMIN — IBUPROFEN 600 MG: 600 TABLET, FILM COATED ORAL at 04:14

## 2019-11-20 RX ADMIN — IBUPROFEN 600 MG: 600 TABLET, FILM COATED ORAL at 21:07

## 2019-11-20 RX ADMIN — DOCUSATE SODIUM 100 MG: 100 CAPSULE, LIQUID FILLED ORAL at 08:43

## 2019-11-20 RX ADMIN — DOCUSATE SODIUM 100 MG: 100 CAPSULE, LIQUID FILLED ORAL at 21:07

## 2019-11-20 RX ADMIN — ACETAMINOPHEN 650 MG: 325 TABLET ORAL at 00:33

## 2019-11-20 NOTE — LACTATION NOTE
11/19/19 1445   Maternal Information   Date of Referral 11/19/19   Person Making Referral (courtesy consult)   Equipment Type   Breast Pump Type double electric, personal  (pump at home)   Reproductive Interventions   Breastfeeding Assistance feeding cue recognition promoted;feeding on demand promoted;support offered   Breastfeeding Support diary/feeding log utilized;encouragement provided;lactation counseling provided   Coping/Psychosocial Interventions   Parent/Child Attachment Promotion cue recognition promoted;caring behavior modeled;face-to-face positioning promoted;positive reinforcement provided;skin-to-skin contact encouraged;strengths emphasized   Supportive Measures active listening utilized   Mom states baby has  well. Teaching done, as documented under Education. To call lactation services, if there are questions or concerns.

## 2019-11-20 NOTE — PROGRESS NOTES
11/20/2019  PPD #1    Subjective   Leona feels well.  Patient describes her lochia as less than menses.  Pain is well controlled       Objective   Temp: Temp:  [98.4 °F (36.9 °C)-99 °F (37.2 °C)] 98.7 °F (37.1 °C) Temp src: Oral   BP: BP: (116-128)/(58-74) 120/73        Pulse: Heart Rate:  [73-86] 86  RR: Resp:  [16-18] 16    General:  No acute distress   Abdomen: Fundus firm and beneath umbilicus   Pelvis: deferred     Lab Results   Component Value Date    WBC 13.66 (H) 11/18/2019    HGB 11.4 (L) 11/18/2019    HCT 34.6 11/18/2019    MCV 94.0 11/18/2019     11/18/2019    GLU 90 06/29/2018    URICACID 6.6 (H) 11/18/2019    AST 14 11/18/2019    ALT 10 11/18/2019     11/18/2019     Results from last 7 days   Lab Units 11/18/19  2300   ABO TYPING  O   RH TYPING  Positive   ANTIBODY SCREEN  Negative       Assessment  1. PPD# 1 after vaginal delivery    Plan  1. Supportive care, anticipate d/c in am.      This note has been electronically signed.    Kena Polo MD  7:30 AM  November 20, 2019

## 2019-11-20 NOTE — PLAN OF CARE
Problem: Patient Care Overview  Goal: Plan of Care Review  Outcome: Ongoing (interventions implemented as appropriate)   11/20/19 0614   Coping/Psychosocial   Plan of Care Reviewed With patient   Plan of Care Review   Progress improving   OTHER   Outcome Summary daisy minorl- good pain control- vdg-      Goal: Discharge Needs Assessment  Outcome: Ongoing (interventions implemented as appropriate)    Goal: Interprofessional Rounds/Family Conf  Outcome: Ongoing (interventions implemented as appropriate)      Problem: Labor (Cervical Ripen, Induct, Augment) (Adult,Obstetrics,Pediatric)  Goal: Signs and Symptoms of Listed Potential Problems Will be Absent, Minimized or Managed (Labor)  Outcome: Outcome(s) achieved Date Met: 11/20/19      Problem: Breastfeeding (Adult,Obstetrics,Pediatric)  Goal: Signs and Symptoms of Listed Potential Problems Will be Absent, Minimized or Managed (Breastfeeding)  Outcome: Ongoing (interventions implemented as appropriate)      Problem: Postpartum (Vaginal Delivery) (Adult,Obstetrics,Pediatric)  Goal: Signs and Symptoms of Listed Potential Problems Will be Absent, Minimized or Managed (Postpartum)  Outcome: Ongoing (interventions implemented as appropriate)

## 2019-11-21 VITALS
SYSTOLIC BLOOD PRESSURE: 114 MMHG | HEIGHT: 62 IN | HEART RATE: 68 BPM | TEMPERATURE: 98.3 F | WEIGHT: 240 LBS | RESPIRATION RATE: 16 BRPM | DIASTOLIC BLOOD PRESSURE: 74 MMHG | BODY MASS INDEX: 44.16 KG/M2

## 2019-11-21 LAB — HCV AB SER DONR QL: NORMAL

## 2019-11-21 PROCEDURE — 86803 HEPATITIS C AB TEST: CPT | Performed by: OBSTETRICS & GYNECOLOGY

## 2019-11-21 RX ORDER — IBUPROFEN 600 MG/1
600 TABLET ORAL EVERY 6 HOURS PRN
Qty: 20 TABLET | Refills: 0 | Status: SHIPPED | OUTPATIENT
Start: 2019-11-21 | End: 2021-04-27

## 2019-11-21 RX ADMIN — ACETAMINOPHEN 650 MG: 325 TABLET ORAL at 10:08

## 2019-11-21 RX ADMIN — IBUPROFEN 600 MG: 600 TABLET, FILM COATED ORAL at 10:07

## 2019-11-21 RX ADMIN — DOCUSATE SODIUM 100 MG: 100 CAPSULE, LIQUID FILLED ORAL at 10:07

## 2019-11-21 NOTE — PROGRESS NOTES
11/21/2019  PPD #2    Subjective   Leona feels well.  Patient describes her lochia as less than menses.  Pain is well controlled       Objective   Temp: Temp:  [98.3 °F (36.8 °C)-98.5 °F (36.9 °C)] 98.3 °F (36.8 °C) Temp src: Oral   BP: BP: (111-122)/(67-74) 114/74        Pulse: Heart Rate:  [52-74] 68  RR: Resp:  [16] 16    General:  No acute distress   Abdomen: Fundus firm and beneath umbilicus   Pelvis: deferred     Lab Results   Component Value Date    WBC 12.08 (H) 11/20/2019    HGB 9.7 (L) 11/20/2019    HCT 30.2 (L) 11/20/2019    MCV 96.8 11/20/2019     11/20/2019    AST 14 11/18/2019    URICACID 6.6 (H) 11/18/2019    GLU 90 06/29/2018       Assessment  1. PPD# 2 after vaginal delivery    Plan  1. Discharge to home  2. Follow up with LW  in 6 weeks  3. Advised no tampons, intercourse, or tub baths for 6 weeks.       This note has been electronically signed.    Kena Polo MD  10:36 AM  November 21, 2019

## 2019-11-21 NOTE — PLAN OF CARE
Problem: Patient Care Overview  Goal: Plan of Care Review  Outcome: Ongoing (interventions implemented as appropriate)   11/21/19 0302   Coping/Psychosocial   Plan of Care Reviewed With patient   Plan of Care Review   Progress improving   OTHER   Outcome Summary vss, fundus and lochia wdl, breastfeeding ok,pain controlled with prn pain medication     Goal: Individualization and Mutuality  Outcome: Ongoing (interventions implemented as appropriate)   11/21/19 0302   Individualization   Patient Specific Preferences breastfeeding   Patient Specific Goals (Include Timeframe) none verbalized        Problem: Breastfeeding (Adult,Obstetrics,Pediatric)  Goal: Signs and Symptoms of Listed Potential Problems Will be Absent, Minimized or Managed (Breastfeeding)  Outcome: Ongoing (interventions implemented as appropriate)   11/21/19 0302   Goal/Outcome Evaluation   Problems Assessed (Breastfeeding) all   Problems Present (Breastfeeding) none       Problem: Postpartum (Vaginal Delivery) (Adult,Obstetrics,Pediatric)  Goal: Signs and Symptoms of Listed Potential Problems Will be Absent, Minimized or Managed (Postpartum)  Outcome: Ongoing (interventions implemented as appropriate)   11/21/19 0302   Goal/Outcome Evaluation   Problems Assessed (Postpartum Vaginal Delivery) all   Problems Present (Postpartum Vag Deliv) pain

## 2019-11-21 NOTE — DISCHARGE SUMMARY
[facility]  Vaginal delivery discharge summary      Patient: Leona Murillo      MR#:[id number]  Admission  Diagnosis:   Patient Active Problem List   Diagnosis   • PCOS (polycystic ovarian syndrome)   • Patient currently pregnant   • Pregnancy   • 39 weeks gestation of pregnancy   • Desires  (vaginal birth after ) trial   • Hx successful  (vaginal birth after ), currently pregnant     Discharge Diagnosis: Vaginal delivery    Date of Admission: 2019  Date of Discharge:  2019    Procedures:  Vaginal, Spontaneous     2019    11:27 AM      Service:  Obstetrics    Hospital Course:  Patient underwent vaginal delivery and remained in the hospital for 3 days.  During that time she remained afebrile and hemodynamically stable.  On the day of discharge, she was eating, ambulating and voiding without difficulty.      Labs:    Lab Results   Component Value Date    WBC 12.08 (H) 2019    HGB 9.7 (L) 2019    HCT 30.2 (L) 2019    MCV 96.8 2019     2019    GLU 90 2018    URICACID 6.6 (H) 2019    AST 14 2019    ALT 10 2019     2019     Results from last 7 days   Lab Units 19  2300   ABO TYPING  O   RH TYPING  Positive   ANTIBODY SCREEN  Negative       Discharge Medications     Discharge Medications      New Medications      Instructions Start Date   ibuprofen 600 MG tablet  Commonly known as:  ADVIL,MOTRIN   600 mg, Oral, Every 6 Hours PRN         Continue These Medications      Instructions Start Date   Prenatal 27-1 27-1 MG tablet tablet   Oral, Daily             Discharge Disposition:  To Home    Discharge Condition:  Stable    Discharge Diet: Regular    Activity at Discharge: Pelvic rest    Follow-up Appointments  Follow up with [facility] in 4-6 weeks.     Kena Polo MD  19  10:39 AM

## 2020-01-06 ENCOUNTER — TELEPHONE (OUTPATIENT)
Dept: INTERNAL MEDICINE | Facility: CLINIC | Age: 25
End: 2020-01-06

## 2020-01-06 NOTE — TELEPHONE ENCOUNTER
Patient called because she has a lump on her neck, recently gave birth, and her family has a history of MRSA. No appointments available. Patient plans on going to urgent care as advised. Patient wanted to make sure you knew about this and if she needs to be worked in to see Dr. Marin due to her family hx. Please advise.

## 2020-01-06 NOTE — TELEPHONE ENCOUNTER
Tried to call patient and the phone went straight to a message that says her voicemail box has not been set up. Will try again in the morning.

## 2020-01-07 NOTE — TELEPHONE ENCOUNTER
Pt states the bump on her neck is red and painful. She has mupirocin ointment at home and she has been putting that on it. Not helping at all. Tried to express the bump but nothing came out of it. She has tried a warm a compress but again, nothing happened. She states the bump makes her neck sore to move it. Offer pt an appt tomorrow but she can only come on Monday's due to her  working out of town through the week. The next Monday I could find something to schedule her in was the 27th. Pt agreed. Advised if the bump worsens or she develops a fever, she needs to go to the . Pt understands.

## 2020-04-03 ENCOUNTER — E-VISIT (OUTPATIENT)
Dept: INTERNAL MEDICINE | Facility: CLINIC | Age: 25
End: 2020-04-03

## 2020-04-03 DIAGNOSIS — N30.00 ACUTE CYSTITIS WITHOUT HEMATURIA: Primary | ICD-10-CM

## 2020-04-03 PROCEDURE — 99421 OL DIG E/M SVC 5-10 MIN: CPT | Performed by: FAMILY MEDICINE

## 2020-04-03 NOTE — PROGRESS NOTES
Leona Murillo    1995  [id number]    I have reviewed the e-Visit questionnaire and patient's answers, my assessment and plan are as follows:    HPI  No fever, shortness of breath, cough.  Pt not breastfeeding.    Review of Systems - General ROS: negative for - fever  Genito-Urinary ROS: positive for - urinary frequency/urgency        Diagnoses and all orders for this visit:    Acute cystitis without hematuria  -     nitrofurantoin, macrocrystal-monohydrate, (MACROBID) 100 MG capsule; Take 1 capsule by mouth Every 12 (Twelve) Hours for 3 days.        Any medications prescribed have been sent electronically to   [facility]/pharmacy #[id number] - [place], [place] - [address] - [phone] PH - [phone]   [address] [address]  Phone: [phone] Fax: [phone]    [facility] - [address] [address]  Phone: [phone] Fax: [phone]    [facility] #[id number] - [place], [place] - [address] - [phone] PH - [phone] FX  [address]  [facility] [address]  Phone: [phone] Fax: [phone]        Dorinda Marin MD  04/06/20  1:48 PM    Time Documentation:  04/06/20 1:48 PM to 1:50 PM (2 min)  04/06/20 8:12 AM to 8:14 AM (2 min)

## 2020-04-06 RX ORDER — NITROFURANTOIN 25; 75 MG/1; MG/1
100 CAPSULE ORAL EVERY 12 HOURS SCHEDULED
Qty: 6 CAPSULE | Refills: 0 | Status: SHIPPED | OUTPATIENT
Start: 2020-04-06 | End: 2020-04-09

## 2020-04-06 NOTE — PATIENT INSTRUCTIONS
Urinary Tract Infection, Adult    A urinary tract infection (UTI) is an infection of any part of the urinary tract. The urinary tract includes the kidneys, ureters, bladder, and urethra. These organs make, store, and get rid of urine in the body.  Your health care provider may use other names to describe the infection. An upper UTI affects the ureters and kidneys (pyelonephritis). A lower UTI affects the bladder (cystitis) and urethra (urethritis).  What are the causes?  Most urinary tract infections are caused by bacteria in your genital area, around the entrance to your urinary tract (urethra). These bacteria grow and cause inflammation of your urinary tract.  What increases the risk?  You are more likely to develop this condition if:  · You have a urinary catheter that stays in place (indwelling).  · You are not able to control when you urinate or have a bowel movement (you have incontinence).  · You are female and you:  ? Use a spermicide or diaphragm for birth control.  ? Have low estrogen levels.  ? Are pregnant.  · You have certain genes that increase your risk (genetics).  · You are sexually active.  · You take antibiotic medicines.  · You have a condition that causes your flow of urine to slow down, such as:  ? An enlarged prostate, if you are male.  ? Blockage in your urethra (stricture).  ? A kidney stone.  ? A nerve condition that affects your bladder control (neurogenic bladder).  ? Not getting enough to drink, or not urinating often.  · You have certain medical conditions, such as:  ? Diabetes.  ? A weak disease-fighting system (immunesystem).  ? Sickle cell disease.  ? Gout.  ? Spinal cord injury.  What are the signs or symptoms?  Symptoms of this condition include:  · Needing to urinate right away (urgently).  · Frequent urination or passing small amounts of urine frequently.  · Pain or burning with urination.  · Blood in the urine.  · Urine that smells bad or unusual.  · Trouble urinating.  · Cloudy  urine.  · Vaginal discharge, if you are female.  · Pain in the abdomen or the lower back.  You may also have:  · Vomiting or a decreased appetite.  · Confusion.  · Irritability or tiredness.  · A fever.  · Diarrhea.  The first symptom in older adults may be confusion. In some cases, they may not have any symptoms until the infection has worsened.  How is this diagnosed?  This condition is diagnosed based on your medical history and a physical exam. You may also have other tests, including:  · Urine tests.  · Blood tests.  · Tests for sexually transmitted infections (STIs).  If you have had more than one UTI, a cystoscopy or imaging studies may be done to determine the cause of the infections.  How is this treated?  Treatment for this condition includes:  · Antibiotic medicine.  · Over-the-counter medicines to treat discomfort.  · Drinking enough water to stay hydrated.  If you have frequent infections or have other conditions such as a kidney stone, you may need to see a health care provider who specializes in the urinary tract (urologist).  In rare cases, urinary tract infections can cause sepsis. Sepsis is a life-threatening condition that occurs when the body responds to an infection. Sepsis is treated in the hospital with IV antibiotics, fluids, and other medicines.  Follow these instructions at home:    Medicines  · Take over-the-counter and prescription medicines only as told by your health care provider.  · If you were prescribed an antibiotic medicine, take it as told by your health care provider. Do not stop using the antibiotic even if you start to feel better.  General instructions  · Make sure you:  ? Empty your bladder often and completely. Do not hold urine for long periods of time.  ? Empty your bladder after sex.  ? Wipe from front to back after a bowel movement if you are female. Use each tissue one time when you wipe.  · Drink enough fluid to keep your urine pale yellow.  · Keep all follow-up  visits as told by your health care provider. This is important.  Contact a health care provider if:  · Your symptoms do not get better after 1-2 days.  · Your symptoms go away and then return.  Get help right away if you have:  · Severe pain in your back or your lower abdomen.  · A fever.  · Nausea or vomiting.  Summary  · A urinary tract infection (UTI) is an infection of any part of the urinary tract, which includes the kidneys, ureters, bladder, and urethra.  · Most urinary tract infections are caused by bacteria in your genital area, around the entrance to your urinary tract (urethra).  · Treatment for this condition often includes antibiotic medicines.  · If you were prescribed an antibiotic medicine, take it as told by your health care provider. Do not stop using the antibiotic even if you start to feel better.  · Keep all follow-up visits as told by your health care provider. This is important.  This information is not intended to replace advice given to you by your health care provider. Make sure you discuss any questions you have with your health care provider.  Document Released: 09/27/2006 Document Revised: 12/05/2019 Document Reviewed: 06/27/2019  [employer] Interactive Patient Education © 2020 [employer]

## 2020-06-22 ENCOUNTER — LAB (OUTPATIENT)
Dept: LAB | Facility: HOSPITAL | Age: 25
End: 2020-06-22

## 2020-06-22 ENCOUNTER — TRANSCRIBE ORDERS (OUTPATIENT)
Dept: LAB | Facility: HOSPITAL | Age: 25
End: 2020-06-22

## 2020-06-22 DIAGNOSIS — E55.9 AVITAMINOSIS D: Primary | ICD-10-CM

## 2020-06-22 DIAGNOSIS — N30.00 ACUTE CYSTITIS WITHOUT HEMATURIA: ICD-10-CM

## 2020-06-23 RX ORDER — NITROFURANTOIN 25; 75 MG/1; MG/1
CAPSULE ORAL
Qty: 6 CAPSULE | Refills: 0 | OUTPATIENT
Start: 2020-06-23

## 2020-06-26 ENCOUNTER — TELEPHONE (OUTPATIENT)
Dept: INTERNAL MEDICINE | Facility: CLINIC | Age: 25
End: 2020-06-26

## 2020-06-26 NOTE — TELEPHONE ENCOUNTER
Spoke with patient and advised she will need to be seen since she has not been seen since 2018 (no labs either). Scheduled in August per patient's request.

## 2020-06-26 NOTE — TELEPHONE ENCOUNTER
Patient states that she needs a refill of Vitamin D 5000 or 31224.  She can be reached at [phone]    Toshia Mitchell

## 2020-07-24 ENCOUNTER — LAB (OUTPATIENT)
Dept: LAB | Facility: HOSPITAL | Age: 25
End: 2020-07-24

## 2020-07-24 ENCOUNTER — TRANSCRIBE ORDERS (OUTPATIENT)
Dept: LAB | Facility: HOSPITAL | Age: 25
End: 2020-07-24

## 2020-07-24 DIAGNOSIS — R63.5 ABNORMAL WEIGHT GAIN: ICD-10-CM

## 2020-07-24 DIAGNOSIS — E28.2 POLYCYSTIC OVARIES: ICD-10-CM

## 2020-07-24 DIAGNOSIS — R63.5 ABNORMAL WEIGHT GAIN: Primary | ICD-10-CM

## 2020-07-24 LAB
25(OH)D3 SERPL-MCNC: 30.3 NG/ML (ref 30–100)
ALBUMIN SERPL-MCNC: 4.5 G/DL (ref 3.5–5.2)
ALBUMIN/GLOB SERPL: 1.7 G/DL
ALP SERPL-CCNC: 65 U/L (ref 39–117)
ALT SERPL W P-5'-P-CCNC: 20 U/L (ref 1–33)
ANION GAP SERPL CALCULATED.3IONS-SCNC: 9.3 MMOL/L (ref 5–15)
AST SERPL-CCNC: 12 U/L (ref 1–32)
BILIRUB SERPL-MCNC: 0.4 MG/DL (ref 0–1.2)
BUN SERPL-MCNC: 13 MG/DL (ref 6–20)
BUN/CREAT SERPL: 17.1 (ref 7–25)
CALCIUM SPEC-SCNC: 9.5 MG/DL (ref 8.6–10.5)
CHLORIDE SERPL-SCNC: 106 MMOL/L (ref 98–107)
CHOLEST SERPL-MCNC: 149 MG/DL (ref 0–200)
CO2 SERPL-SCNC: 25.7 MMOL/L (ref 22–29)
CREAT SERPL-MCNC: 0.76 MG/DL (ref 0.57–1)
DEPRECATED RDW RBC AUTO: 44.8 FL (ref 37–54)
ERYTHROCYTE [DISTWIDTH] IN BLOOD BY AUTOMATED COUNT: 13 % (ref 12.3–15.4)
ESTRADIOL SERPL HS-MCNC: 43.7 PG/ML
GFR SERPL CREATININE-BSD FRML MDRD: 93 ML/MIN/1.73
GLOBULIN UR ELPH-MCNC: 2.6 GM/DL
GLUCOSE SERPL-MCNC: 118 MG/DL (ref 65–99)
HBA1C MFR BLD: 5.7 % (ref 4.8–5.6)
HCT VFR BLD AUTO: 40.7 % (ref 34–46.6)
HDLC SERPL-MCNC: 34 MG/DL (ref 40–60)
HGB BLD-MCNC: 13.3 G/DL (ref 12–15.9)
LDLC SERPL CALC-MCNC: 91 MG/DL (ref 0–100)
LDLC/HDLC SERPL: 2.68 {RATIO}
MCH RBC QN AUTO: 30.2 PG (ref 26.6–33)
MCHC RBC AUTO-ENTMCNC: 32.7 G/DL (ref 31.5–35.7)
MCV RBC AUTO: 92.5 FL (ref 79–97)
PLATELET # BLD AUTO: 248 10*3/MM3 (ref 140–450)
PMV BLD AUTO: 11.2 FL (ref 6–12)
POTASSIUM SERPL-SCNC: 4.4 MMOL/L (ref 3.5–5.2)
PROGEST SERPL-MCNC: 1.48 NG/ML
PROT SERPL-MCNC: 7.1 G/DL (ref 6–8.5)
RBC # BLD AUTO: 4.4 10*6/MM3 (ref 3.77–5.28)
SODIUM SERPL-SCNC: 141 MMOL/L (ref 136–145)
TESTOST SERPL-MCNC: 32.7 NG/DL (ref 8.4–48.1)
TRIGL SERPL-MCNC: 119 MG/DL (ref 0–150)
TSH SERPL DL<=0.05 MIU/L-ACNC: 0.88 UIU/ML (ref 0.27–4.2)
VLDLC SERPL-MCNC: 23.8 MG/DL
WBC # BLD AUTO: 7.34 10*3/MM3 (ref 3.4–10.8)

## 2020-07-24 PROCEDURE — 82306 VITAMIN D 25 HYDROXY: CPT | Performed by: NURSE PRACTITIONER

## 2020-07-24 PROCEDURE — 83036 HEMOGLOBIN GLYCOSYLATED A1C: CPT | Performed by: NURSE PRACTITIONER

## 2020-07-24 PROCEDURE — 80050 GENERAL HEALTH PANEL: CPT | Performed by: NURSE PRACTITIONER

## 2020-07-24 PROCEDURE — 82627 DEHYDROEPIANDROSTERONE: CPT

## 2020-07-24 PROCEDURE — 83498 ASY HYDROXYPROGESTERONE 17-D: CPT

## 2020-07-24 PROCEDURE — 84144 ASSAY OF PROGESTERONE: CPT | Performed by: NURSE PRACTITIONER

## 2020-07-24 PROCEDURE — 82670 ASSAY OF TOTAL ESTRADIOL: CPT

## 2020-07-24 PROCEDURE — 84403 ASSAY OF TOTAL TESTOSTERONE: CPT

## 2020-07-24 PROCEDURE — 80061 LIPID PANEL: CPT

## 2020-07-24 PROCEDURE — 36415 COLL VENOUS BLD VENIPUNCTURE: CPT | Performed by: NURSE PRACTITIONER

## 2020-07-25 LAB — DHEA-S SERPL-MCNC: 386 UG/DL (ref 84.8–378)

## 2020-07-29 LAB — 17OHP SERPL-MCNC: 33 NG/DL

## 2020-10-27 ENCOUNTER — E-VISIT (OUTPATIENT)
Dept: INTERNAL MEDICINE | Facility: CLINIC | Age: 25
End: 2020-10-27

## 2020-10-27 DIAGNOSIS — R39.9 UTI SYMPTOMS: Primary | ICD-10-CM

## 2020-10-27 PROBLEM — O34.219 DESIRES VBAC (VAGINAL BIRTH AFTER CESAREAN) TRIAL: Status: RESOLVED | Noted: 2019-11-18 | Resolved: 2020-10-27

## 2020-10-27 PROBLEM — Z34.90 PATIENT CURRENTLY PREGNANT: Status: RESOLVED | Noted: 2019-11-13 | Resolved: 2020-10-27

## 2020-10-27 PROBLEM — Z34.90 PREGNANCY: Status: RESOLVED | Noted: 2019-11-14 | Resolved: 2020-10-27

## 2020-10-27 PROBLEM — O34.219 HX SUCCESSFUL VBAC (VAGINAL BIRTH AFTER CESAREAN), CURRENTLY PREGNANT: Status: RESOLVED | Noted: 2019-11-18 | Resolved: 2020-10-27

## 2020-10-27 PROBLEM — Z3A.39 39 WEEKS GESTATION OF PREGNANCY: Status: RESOLVED | Noted: 2019-11-18 | Resolved: 2020-10-27

## 2020-10-27 PROCEDURE — 99421 OL DIG E/M SVC 5-10 MIN: CPT | Performed by: FAMILY MEDICINE

## 2020-10-27 RX ORDER — FLUCONAZOLE 150 MG/1
TABLET ORAL
Qty: 2 TABLET | Refills: 0 | Status: SHIPPED | OUTPATIENT
Start: 2020-10-27 | End: 2021-04-27

## 2020-10-27 RX ORDER — NITROFURANTOIN 25; 75 MG/1; MG/1
100 CAPSULE ORAL EVERY 12 HOURS SCHEDULED
Qty: 6 CAPSULE | Refills: 0 | Status: SHIPPED | OUTPATIENT
Start: 2020-10-27 | End: 2020-10-30

## 2020-10-27 NOTE — PATIENT INSTRUCTIONS
Urinary Tract Infection, Adult    A urinary tract infection (UTI) is an infection of any part of the urinary tract. The urinary tract includes the kidneys, ureters, bladder, and urethra. These organs make, store, and get rid of urine in the body.  Your health care provider may use other names to describe the infection. An upper UTI affects the ureters and kidneys (pyelonephritis). A lower UTI affects the bladder (cystitis) and urethra (urethritis).  What are the causes?  Most urinary tract infections are caused by bacteria in your genital area, around the entrance to your urinary tract (urethra). These bacteria grow and cause inflammation of your urinary tract.  What increases the risk?  You are more likely to develop this condition if:  · You have a urinary catheter that stays in place (indwelling).  · You are not able to control when you urinate or have a bowel movement (you have incontinence).  · You are female and you:  ? Use a spermicide or diaphragm for birth control.  ? Have low estrogen levels.  ? Are pregnant.  · You have certain genes that increase your risk (genetics).  · You are sexually active.  · You take antibiotic medicines.  · You have a condition that causes your flow of urine to slow down, such as:  ? An enlarged prostate, if you are male.  ? Blockage in your urethra (stricture).  ? A kidney stone.  ? A nerve condition that affects your bladder control (neurogenic bladder).  ? Not getting enough to drink, or not urinating often.  · You have certain medical conditions, such as:  ? Diabetes.  ? A weak disease-fighting system (immunesystem).  ? Sickle cell disease.  ? Gout.  ? Spinal cord injury.  What are the signs or symptoms?  Symptoms of this condition include:  · Needing to urinate right away (urgently).  · Frequent urination or passing small amounts of urine frequently.  · Pain or burning with urination.  · Blood in the urine.  · Urine that smells bad or unusual.  · Trouble urinating.  · Cloudy  urine.  · Vaginal discharge, if you are female.  · Pain in the abdomen or the lower back.  You may also have:  · Vomiting or a decreased appetite.  · Confusion.  · Irritability or tiredness.  · A fever.  · Diarrhea.  The first symptom in older adults may be confusion. In some cases, they may not have any symptoms until the infection has worsened.  How is this diagnosed?  This condition is diagnosed based on your medical history and a physical exam. You may also have other tests, including:  · Urine tests.  · Blood tests.  · Tests for sexually transmitted infections (STIs).  If you have had more than one UTI, a cystoscopy or imaging studies may be done to determine the cause of the infections.  How is this treated?  Treatment for this condition includes:  · Antibiotic medicine.  · Over-the-counter medicines to treat discomfort.  · Drinking enough water to stay hydrated.  If you have frequent infections or have other conditions such as a kidney stone, you may need to see a health care provider who specializes in the urinary tract (urologist).  In rare cases, urinary tract infections can cause sepsis. Sepsis is a life-threatening condition that occurs when the body responds to an infection. Sepsis is treated in the hospital with IV antibiotics, fluids, and other medicines.  Follow these instructions at home:    Medicines  · Take over-the-counter and prescription medicines only as told by your health care provider.  · If you were prescribed an antibiotic medicine, take it as told by your health care provider. Do not stop using the antibiotic even if you start to feel better.  General instructions  · Make sure you:  ? Empty your bladder often and completely. Do not hold urine for long periods of time.  ? Empty your bladder after sex.  ? Wipe from front to back after a bowel movement if you are female. Use each tissue one time when you wipe.  · Drink enough fluid to keep your urine pale yellow.  · Keep all follow-up  visits as told by your health care provider. This is important.  Contact a health care provider if:  · Your symptoms do not get better after 1-2 days.  · Your symptoms go away and then return.  Get help right away if you have:  · Severe pain in your back or your lower abdomen.  · A fever.  · Nausea or vomiting.  Summary  · A urinary tract infection (UTI) is an infection of any part of the urinary tract, which includes the kidneys, ureters, bladder, and urethra.  · Most urinary tract infections are caused by bacteria in your genital area, around the entrance to your urinary tract (urethra).  · Treatment for this condition often includes antibiotic medicines.  · If you were prescribed an antibiotic medicine, take it as told by your health care provider. Do not stop using the antibiotic even if you start to feel better.  · Keep all follow-up visits as told by your health care provider. This is important.  This information is not intended to replace advice given to you by your health care provider. Make sure you discuss any questions you have with your health care provider.  Document Released: 09/27/2006 Document Revised: 12/05/2019 Document Reviewed: 06/27/2019  [employer] Patient Education © 2020 [employer]

## 2020-10-27 NOTE — PROGRESS NOTES
"Leona Millwood    1995  [id number]    I have reviewed the e-Visit questionnaire and patient's answers, my assessment and plan are as follows:    HPI    Pain while passing urine             Frequent urination             Urgency urinating             Urine odor  Back pain, abdominal pain  No fevers   \"I recently traveled and had to hold my urine for a while before finding places with open restrooms, I'm not sure if that can cause a UTI but I'm 100% positive that's what this is. I started having symptoms immeadiately following the  trip.  Feels like I need to constantly urinate and nothing comes out. My lower belly and my lower back hurt as well following this incident.\"  Not pregnant per questionnaire, will update problem list    Review of Systems - General ROS: negative for - fever  Respiratory ROS: negative for - shortness of breath      Diagnoses and all orders for this visit:    1. UTI symptoms (Primary)  -     nitrofurantoin, macrocrystal-monohydrate, (MACROBID) 100 MG capsule; Take 1 capsule by mouth Every 12 (Twelve) Hours for 3 days.  Dispense: 6 capsule; Refill: 0    Other orders  -     fluconazole (Diflucan) 150 MG tablet; TAKE ONE TAB PO X ONCE, CAN REPEAT IN 4 DAYS IF NEEDED FOR YEAST INFECTION  Dispense: 2 tablet; Refill: 0          Any medications prescribed have been sent electronically to   [facility]/pharmacy #[id number] - [place], [place] - [address] - [phone] PH - [phone] FX  [address] [address]  Phone: [phone] Fax: [phone]    [facility] - [address] [address]  Phone: [phone] Fax: [phone]    [facility] #[id number] - [place], [place] - [address] - [phone] PH - [phone] FX  [address]  [facility] [address]  Phone: [phone] Fax: [phone]        Dorinda Marin MD  10/27/20  15:40 EDT    Time Documentation:  10/27/20 15:40 EDT to 15:45 EDT     "

## 2020-11-02 ENCOUNTER — TELEPHONE (OUTPATIENT)
Dept: INTERNAL MEDICINE | Facility: CLINIC | Age: 25
End: 2020-11-02

## 2020-11-02 ENCOUNTER — E-VISIT (OUTPATIENT)
Dept: INTERNAL MEDICINE | Facility: CLINIC | Age: 25
End: 2020-11-02

## 2020-11-02 NOTE — TELEPHONE ENCOUNTER
PT MADE AN APPT TO BE SEEN ON 11/5. PT IS ASKING IS THERE ANYTHING OTC THAT SHE CAN TAKE THAT WOULD BE OK.          PLEASE ADVISE  [phone]

## 2020-11-02 NOTE — TELEPHONE ENCOUNTER
Spoke with pt and discussed her UTI, she states she took all but the last day of the macrobid that was previously prescribed to her. She states her right lower back hurts to even touch it. Advised she def needs to be seen and offered her an appt tomorrow. However, her car is in the shop (recalls) and she does not have a vehicle right now. Suggested Tylenol, cranberry juice, and Azo. Advised if she gets her car back sooner, please call me and I will get her in sooner. Pt understands.

## 2020-11-02 NOTE — TELEPHONE ENCOUNTER
Is there no openings prior to 11/5? That seems a bit far out for her current issue.    Cranberry juice okay, azo okay

## 2020-11-02 NOTE — TELEPHONE ENCOUNTER
Pt did e-visit 10/27 for same issue, per that visit needs to be seen if does not improve. Please call to schedule with a provider in our office if at all possible.

## 2020-11-02 NOTE — PROGRESS NOTES
aruna for same issue 10-27  Needs visit in office  See telephone note--requesting patient be scheduled as she needs further evaluation    Dorinda Marin MD

## 2020-11-14 ENCOUNTER — HOSPITAL ENCOUNTER (EMERGENCY)
Facility: HOSPITAL | Age: 25
Discharge: HOME | End: 2020-11-15
Attending: EMERGENCY MEDICINE | Admitting: EMERGENCY MEDICINE

## 2020-11-14 ENCOUNTER — APPOINTMENT (OUTPATIENT)
Dept: CT IMAGING | Facility: HOSPITAL | Age: 25
End: 2020-11-14

## 2020-11-14 VITALS
HEART RATE: 127 BPM | DIASTOLIC BLOOD PRESSURE: 74 MMHG | OXYGEN SATURATION: 98 % | TEMPERATURE: 98.9 F | HEIGHT: 62 IN | BODY MASS INDEX: 44.26 KG/M2 | RESPIRATION RATE: 18 BRPM | SYSTOLIC BLOOD PRESSURE: 110 MMHG | WEIGHT: 240.5 LBS

## 2020-11-14 DIAGNOSIS — R10.9 BILATERAL FLANK PAIN: Primary | ICD-10-CM

## 2020-11-14 LAB
B-HCG UR QL: NEGATIVE
BACTERIA UR QL AUTO: ABNORMAL /HPF
BILIRUB UR QL STRIP: NEGATIVE
CLARITY UR: ABNORMAL
COLOR UR: YELLOW
GLUCOSE UR STRIP-MCNC: NEGATIVE MG/DL
HGB UR QL STRIP.AUTO: NEGATIVE
HOLD SPECIMEN: NORMAL
HOLD SPECIMEN: NORMAL
HYALINE CASTS UR QL AUTO: ABNORMAL /LPF
KETONES UR QL STRIP: NEGATIVE
LEUKOCYTE ESTERASE UR QL STRIP.AUTO: ABNORMAL
NITRITE UR QL STRIP: NEGATIVE
PH UR STRIP.AUTO: <=5 [PH] (ref 5–8)
PROT UR QL STRIP: NEGATIVE
RBC # UR: ABNORMAL /HPF
REF LAB TEST METHOD: ABNORMAL
SP GR UR STRIP: 1.02 (ref 1–1.03)
SQUAMOUS #/AREA URNS HPF: ABNORMAL /HPF
UROBILINOGEN UR QL STRIP: ABNORMAL
WBC UR QL AUTO: ABNORMAL /HPF
WHOLE BLOOD HOLD SPECIMEN: NORMAL
WHOLE BLOOD HOLD SPECIMEN: NORMAL

## 2020-11-14 PROCEDURE — 74176 CT ABD & PELVIS W/O CONTRAST: CPT

## 2020-11-14 PROCEDURE — 25010000002 ONDANSETRON PER 1 MG: Performed by: EMERGENCY MEDICINE

## 2020-11-14 PROCEDURE — 25010000002 KETOROLAC TROMETHAMINE PER 15 MG: Performed by: EMERGENCY MEDICINE

## 2020-11-14 PROCEDURE — 81025 URINE PREGNANCY TEST: CPT | Performed by: EMERGENCY MEDICINE

## 2020-11-14 PROCEDURE — 96375 TX/PRO/DX INJ NEW DRUG ADDON: CPT

## 2020-11-14 PROCEDURE — 99283 EMERGENCY DEPT VISIT LOW MDM: CPT

## 2020-11-14 PROCEDURE — 81001 URINALYSIS AUTO W/SCOPE: CPT | Performed by: EMERGENCY MEDICINE

## 2020-11-14 PROCEDURE — 96361 HYDRATE IV INFUSION ADD-ON: CPT

## 2020-11-14 PROCEDURE — 96374 THER/PROPH/DIAG INJ IV PUSH: CPT

## 2020-11-14 RX ORDER — KETOROLAC TROMETHAMINE 30 MG/ML
30 INJECTION, SOLUTION INTRAMUSCULAR; INTRAVENOUS EVERY 6 HOURS PRN
Status: DISCONTINUED | OUTPATIENT
Start: 2020-11-14 | End: 2020-11-15 | Stop reason: HOSPADM

## 2020-11-14 RX ORDER — ONDANSETRON 2 MG/ML
4 INJECTION INTRAMUSCULAR; INTRAVENOUS ONCE
Status: COMPLETED | OUTPATIENT
Start: 2020-11-14 | End: 2020-11-14

## 2020-11-14 RX ORDER — ACETAMINOPHEN 325 MG/1
650 TABLET ORAL ONCE
Status: COMPLETED | OUTPATIENT
Start: 2020-11-14 | End: 2020-11-14

## 2020-11-14 RX ORDER — TRAMADOL HYDROCHLORIDE 50 MG/1
50 TABLET ORAL EVERY 6 HOURS PRN
Qty: 8 TABLET | Refills: 0 | Status: SHIPPED | OUTPATIENT
Start: 2020-11-14 | End: 2021-04-27

## 2020-11-14 RX ORDER — ACETAMINOPHEN 325 MG/1
650 TABLET ORAL ONCE
Status: DISCONTINUED | OUTPATIENT
Start: 2020-11-14 | End: 2020-11-15 | Stop reason: HOSPADM

## 2020-11-14 RX ADMIN — KETOROLAC TROMETHAMINE 30 MG: 30 INJECTION, SOLUTION INTRAMUSCULAR at 23:20

## 2020-11-14 RX ADMIN — ACETAMINOPHEN 650 MG: 325 TABLET, FILM COATED ORAL at 22:18

## 2020-11-14 RX ADMIN — ONDANSETRON 4 MG: 2 INJECTION, SOLUTION INTRAMUSCULAR; INTRAVENOUS at 23:20

## 2020-11-14 RX ADMIN — SODIUM CHLORIDE 1000 ML: 9 INJECTION, SOLUTION INTRAVENOUS at 23:21

## 2020-11-16 NOTE — ED PROVIDER NOTES
Subjective   History of Present Illness    Chief Complaint: Bilateral flank pain,  History of Present Illness: 25-year-old female presents with bilateral paraspinal flank pain.  Reports that she had recently treated antibiotics for UTI, having increasing pain, concern for kidney stone.  Onset: Today  Duration: Persistent  Exacerbating / Alleviating factors: None  Associated symptoms: None      Nurses Notes reviewed and agree, including vitals, allergies, social history and prior medical history.     REVIEW OF SYSTEMS: All systems reviewed and not pertinent unless noted.    Positive for: Flank pain    Negative for: Fever GI bleeding lower urinary symptoms, diarrhea, chills, hematuria  Review of Systems    Past Medical History:   Diagnosis Date   • Gestational diabetes    • Obesity    • PCOS (polycystic ovarian syndrome)        Allergies   Allergen Reactions   • Amoxicillin Hives   • Latex Anaphylaxis   • Sulfa Antibiotics Hives       Past Surgical History:   Procedure Laterality Date   •  SECTION  2014   • CHOLECYSTECTOMY  01/15/2018   • TONSILLECTOMY         Family History   Problem Relation Age of Onset   • Diabetes Father    • Heart disease Father    • Diabetes Mother    • Hypertension Mother    • Heart disease Paternal Grandfather        Social History     Socioeconomic History   • Marital status:      Spouse name: Not on file   • Number of children: Not on file   • Years of education: Not on file   • Highest education level: Not on file   Tobacco Use   • Smoking status: Current Every Day Smoker   • Smokeless tobacco: Never Used   Substance and Sexual Activity   • Alcohol use: No   • Drug use: No   • Sexual activity: Never           Objective   Physical Exam    GENERAL APPEARANCE: Well developed, 25-year-old white female,  in no acute distress.  VITAL SIGNS: per nursing, reviewed and noted  SKIN: exposed skin with no rashes, ulcerations or petechiae.  Head: Normocephalic, atraumatic.    EYES: perrla. EOMI.  ENT: Normal voice.  Patient maintained wearing a mask throughout patient encounter due to coronavirus pandemic  LUNGS:  No increased work of breathing. No retractions.   CARDIOVASCULAR:  regular rate and rhythm, no murmurs.  Good Peripheral pulses. Good cap refill to extremities.   ABDOMEN: Soft, nontender, normal bowel sounds. No hernia. No ascites.  MUSCULOSKELETAL:  No tenderness. Full ROM. Strength and tone normal.  NEUROLOGIC: Alert, oriented x 3. No gross deficits. GCS 15.   NECK: Supple, symmetric. No tenderness, no masses. Full ROM  Back: full rom, mild upper lumbar paraspinal spasm.  Minimal bilateral CVA tenderness.   PSYCH: appropriate affect.  : no bladder tenderness or distention, minimal bilateral CVA tenderness      Procedures  No attending physician procedures were performed on this patient.         ED Course  ED Course as of Nov 15 2223   Sat Nov 14, 2020 2155 HCG, Urine QL: Negative [PF]   2156 Appearance, UA(!): Turbid [PF]   2156 Leukocytes, UA(!): Moderate (2+) [PF]   2156 Nitrite, UA: Negative [PF]   2218 Bacteria, UA(!): Trace [PF]   2218 Squamous Epithelial Cells, UA(!): 7-12 [PF]   2218 WBC, UA(!): 3-5 [PF]   2218 Hyaline Casts, UA: None Seen [PF]   2218 HCG, Urine QL: Negative [PF]   2328 FINAL REPORT     TECHNIQUE:  Multiple contiguous transaxial slices through the abdomen and  pelvis were obtained without the intravenous administration of  contrast.     CLINICAL HISTORY:  gilmar. flank pain     FINDINGS:  There is no renal or ureteral stone or hydronephrosis. The  bladder is normal in contour.  The liver, spleen, pancreas and  adrenal glands appear normal. The bowel gas pattern is  nonobstructive. No focal inflammatory changes are present.  There is no evidence for appendicitis.  Intrauterine device is  present.  Ovaries are unremarkable.  There are shotty mesenteric  lymph nodes.     IMPRESSION:  No acute abnormality     Authenticated by Manuelito Dominguez MD on  11/14/2020 11:27:12 PM    [PF]      ED Course User Index  [PF] Anam Lopes, DO                                           MDM  Patient presents with back pain.  CT scan without acute findings, urinalysis shows trace of bacteria 3-5 white cells no blood, 7-12 squamous epithelial cells.  Patient nontoxic.  Short course tramadol deferred antibiotics, advised outpatient follow-up and return precautions discussed.  Final diagnoses:   Bilateral flank pain            Anam Lopes,   11/15/20 [id number]

## 2021-04-27 ENCOUNTER — OFFICE VISIT (OUTPATIENT)
Dept: INTERNAL MEDICINE | Facility: CLINIC | Age: 26
End: 2021-04-27

## 2021-04-27 VITALS
HEART RATE: 91 BPM | HEIGHT: 62 IN | BODY MASS INDEX: 43.24 KG/M2 | WEIGHT: 235 LBS | TEMPERATURE: 97.7 F | SYSTOLIC BLOOD PRESSURE: 93 MMHG | DIASTOLIC BLOOD PRESSURE: 62 MMHG | OXYGEN SATURATION: 97 %

## 2021-04-27 DIAGNOSIS — F90.9 ATTENTION DEFICIT HYPERACTIVITY DISORDER (ADHD), UNSPECIFIED ADHD TYPE: ICD-10-CM

## 2021-04-27 DIAGNOSIS — F32.9 REACTIVE DEPRESSION: ICD-10-CM

## 2021-04-27 DIAGNOSIS — F43.21 GRIEF: Primary | ICD-10-CM

## 2021-04-27 DIAGNOSIS — E66.01 MORBID (SEVERE) OBESITY DUE TO EXCESS CALORIES (HCC): ICD-10-CM

## 2021-04-27 DIAGNOSIS — L65.9 HAIR LOSS: ICD-10-CM

## 2021-04-27 DIAGNOSIS — E55.9 VITAMIN D DEFICIENCY: ICD-10-CM

## 2021-04-27 PROCEDURE — 99214 OFFICE O/P EST MOD 30 MIN: CPT | Performed by: NURSE PRACTITIONER

## 2021-04-27 RX ORDER — BUPROPION HYDROCHLORIDE 150 MG/1
150 TABLET ORAL DAILY
Qty: 30 TABLET | Refills: 1 | Status: SHIPPED | OUTPATIENT
Start: 2021-04-27 | End: 2021-06-02

## 2021-06-02 ENCOUNTER — OFFICE VISIT (OUTPATIENT)
Dept: INTERNAL MEDICINE | Facility: CLINIC | Age: 26
End: 2021-06-02

## 2021-06-02 VITALS
TEMPERATURE: 98.4 F | OXYGEN SATURATION: 98 % | WEIGHT: 231.4 LBS | DIASTOLIC BLOOD PRESSURE: 84 MMHG | SYSTOLIC BLOOD PRESSURE: 120 MMHG | BODY MASS INDEX: 42.58 KG/M2 | HEIGHT: 62 IN | HEART RATE: 81 BPM | RESPIRATION RATE: 16 BRPM

## 2021-06-02 DIAGNOSIS — R30.0 DYSURIA: Primary | ICD-10-CM

## 2021-06-02 LAB
BILIRUB BLD-MCNC: NEGATIVE MG/DL
CLARITY, POC: ABNORMAL
COLOR UR: YELLOW
GLUCOSE UR STRIP-MCNC: NEGATIVE MG/DL
KETONES UR QL: NEGATIVE
LEUKOCYTE EST, POC: NEGATIVE
NITRITE UR-MCNC: NEGATIVE MG/ML
PH UR: 6 [PH] (ref 5–8)
PROT UR STRIP-MCNC: NEGATIVE MG/DL
RBC # UR STRIP: NEGATIVE /UL
SP GR UR: 1.02 (ref 1–1.03)
UROBILINOGEN UR QL: NORMAL

## 2021-06-02 PROCEDURE — 99213 OFFICE O/P EST LOW 20 MIN: CPT | Performed by: FAMILY MEDICINE

## 2021-06-02 RX ORDER — PHENAZOPYRIDINE HYDROCHLORIDE 200 MG/1
200 TABLET, FILM COATED ORAL 3 TIMES DAILY PRN
Qty: 6 TABLET | Refills: 0 | Status: SHIPPED | OUTPATIENT
Start: 2021-06-02 | End: 2022-08-04

## 2021-06-02 RX ORDER — CIPROFLOXACIN 500 MG/1
500 TABLET, FILM COATED ORAL 2 TIMES DAILY
Qty: 14 TABLET | Refills: 0 | Status: SHIPPED | OUTPATIENT
Start: 2021-06-02 | End: 2021-06-11

## 2021-06-02 NOTE — PROGRESS NOTES
"Leona Murillo is a 25 y.o. female.    Chief Complaint   Patient presents with   • Abdominal Pain     X 1 week    • Back Pain   • Difficulty Urinating       HPI   Patient reports urinary problems for 1 week(s).  They admit to dysuria, lower abdominal pain and low back pain.   Admits to bladder pressure, frequency.  They have not tried anything for this issue.  She reports a h/o recurrent UTI's.      The following portions of the patient's history were reviewed and updated as appropriate: allergies, current medications, past family history, past medical history, past social history, past surgical history and problem list.     Allergies   Allergen Reactions   • Amoxicillin Hives   • Latex Anaphylaxis   • Sulfa Antibiotics Hives         Current Outpatient Medications:   •  ciprofloxacin (Cipro) 500 MG tablet, Take 1 tablet by mouth 2 (Two) Times a Day., Disp: 14 tablet, Rfl: 0  •  phenazopyridine (Pyridium) 200 MG tablet, Take 1 tablet by mouth 3 (Three) Times a Day As Needed for Bladder Spasms., Disp: 6 tablet, Rfl: 0    ROS    Review of Systems   Constitutional: Negative for chills and fever.   Respiratory: Negative for cough and shortness of breath.    Cardiovascular: Negative for chest pain.   Gastrointestinal: Positive for abdominal pain.   Genitourinary: Positive for dysuria, flank pain, frequency and pelvic pain.   Musculoskeletal: Positive for back pain.       Vitals:    06/02/21 1149   BP: 120/84   BP Location: Left arm   Patient Position: Sitting   Cuff Size: Adult   Pulse: 81   Resp: 16   Temp: 98.4 °F (36.9 °C)   TempSrc: Temporal   SpO2: 98%   Weight: 105 kg (231 lb 6.4 oz)   Height: 157.5 cm (62\")     Body mass index is 42.32 kg/m².    Physical Exam     Physical Exam  Constitutional:       General: She is not in acute distress.     Appearance: Normal appearance. She is well-developed.   HENT:      Head: Normocephalic and atraumatic.      Right Ear: External ear normal.      Left Ear: External ear normal. "   Eyes:      Extraocular Movements: Extraocular movements intact.      Conjunctiva/sclera: Conjunctivae normal.   Cardiovascular:      Rate and Rhythm: Normal rate and regular rhythm.      Heart sounds: No murmur heard.     Pulmonary:      Effort: Pulmonary effort is normal. No respiratory distress.      Breath sounds: Normal breath sounds. No wheezing.   Abdominal:      General: Bowel sounds are normal. There is no distension.      Palpations: Abdomen is soft.      Tenderness: There is abdominal tenderness in the suprapubic area. There is no right CVA tenderness or left CVA tenderness.   Skin:     General: Skin is warm and dry.   Neurological:      Mental Status: She is alert and oriented to person, place, and time.      Cranial Nerves: No cranial nerve deficit.         Assessment/Plan    Problems Addressed this Visit     None      Visit Diagnoses     Dysuria    -  Primary    Relevant Orders    POCT urinalysis dipstick, automated (Completed)    Urine Culture - Urine, Urine, Clean Catch      Diagnoses       Codes Comments    Dysuria    -  Primary ICD-10-CM: R30.0  ICD-9-CM: 788.1         UA negative.  However, patient reports she has had a kidney infection with a normal UA in the recent past.  Will send urine for culture for confirmation.  Will go ahead and treat with cipro.  May take pyridium as needed for dysuria.     New Medications Ordered This Visit   Medications   • ciprofloxacin (Cipro) 500 MG tablet     Sig: Take 1 tablet by mouth 2 (Two) Times a Day.     Dispense:  14 tablet     Refill:  0   • phenazopyridine (Pyridium) 200 MG tablet     Sig: Take 1 tablet by mouth 3 (Three) Times a Day As Needed for Bladder Spasms.     Dispense:  6 tablet     Refill:  0       No orders of the defined types were placed in this encounter.      No follow-ups on file.    Yaneth Ruiz DO

## 2021-06-04 LAB
BACTERIA UR CULT: NORMAL
BACTERIA UR CULT: NORMAL

## 2021-06-11 ENCOUNTER — TELEMEDICINE (OUTPATIENT)
Dept: FAMILY MEDICINE CLINIC | Facility: TELEHEALTH | Age: 26
End: 2021-06-11

## 2021-06-11 DIAGNOSIS — J02.0 STREP PHARYNGITIS: Primary | ICD-10-CM

## 2021-06-11 PROCEDURE — 99213 OFFICE O/P EST LOW 20 MIN: CPT | Performed by: NURSE PRACTITIONER

## 2021-06-11 RX ORDER — AZITHROMYCIN 250 MG/1
TABLET, FILM COATED ORAL
Qty: 6 TABLET | Refills: 0 | Status: SHIPPED | OUTPATIENT
Start: 2021-06-11 | End: 2022-08-04

## 2021-06-11 RX ORDER — METHYLPREDNISOLONE 4 MG/1
TABLET ORAL
Qty: 21 TABLET | Refills: 0 | Status: SHIPPED | OUTPATIENT
Start: 2021-06-11 | End: 2022-08-04

## 2021-06-11 NOTE — PATIENT INSTRUCTIONS
Strep Throat, Adult  Strep throat is an infection in the throat that is caused by bacteria. It is common during the cold months of the year. It mostly affects children who are 5-15 years old. However, people of all ages can get it at any time of the year. This infection spreads from person to person (is contagious) through coughing, sneezing, or having close contact.  Your health care provider may use other names to describe the infection. It can be called tonsillitis (if there is swelling of the tonsils), or pharyngitis (if there is swelling at the back of the throat).  What are the causes?  This condition is caused by the Streptococcus pyogenes bacteria.  What increases the risk?  You are more likely to develop this condition if:  · You care for school-age children, or are around school-age children. Children are more likely to get strep throat and may spread it to others.  · You spend time in crowded places where the infection can spread easily.  · You have close contact with someone who has strep throat.  What are the signs or symptoms?  Symptoms of this condition include:  · Fever or chills.  · Redness, swelling, or pain in the tonsils or throat.  · Pain or difficulty when swallowing.  · White or yellow spots on the tonsils or throat.  · Tender glands in the neck and under the jaw.  · Bad smelling breath.  · Red rash all over the body. This is rare.  How is this diagnosed?  This condition is diagnosed by tests that check for the presence and the amount of bacteria that cause strep throat. They are:  · Rapid strep test. Your throat is swabbed and checked for the presence of bacteria. Results are usually ready in minutes.  · Throat culture test. Your throat is swabbed. The sample is placed in a cup that allows infections to grow. Results are usually ready in 1 or 2 days.  How is this treated?  This condition may be treated with:  · Medicines that kill germs (antibiotics).  · Medicines that relieve pain or fever.  These include:  ? Ibuprofen or acetaminophen.  ? Aspirin, only for patients who are over the age of 18.  ? Throat lozenges.  ? Throat sprays.  Follow these instructions at home:  Medicines    · Take over-the-counter and prescription medicines only as told by your health care provider.  · Take your antibiotic medicine as told by your health care provider. Do not stop taking the antibiotic even if you start to feel better.  Eating and drinking    · If you have trouble swallowing, try eating soft foods until your sore throat feels better.  · Drink enough fluid to keep your urine pale yellow.  · To help relieve pain, you may have:  ? Warm fluids, such as soup and tea.  ? Cold fluids, such as frozen desserts or popsicles.  General instructions  · Gargle with a salt-water mixture 3-4 times a day or as needed. To make a salt-water mixture, completely dissolve ½-1 tsp (3-6 g) of salt in 1 cup (237 mL) of warm water.  · Get plenty of rest.  · Stay home from work or school until you have been taking antibiotics for 24 hours.  · Avoid smoking or being around people who smoke.  · Keep all follow-up visits as told by your health care provider. This is important.  How is this prevented?    · Do not share food, drinking cups, or personal items that could cause the infection to spread to other people.  · Wash your hands well with soap and water, and make sure that all people in your house wash their hands well.  · Have family members tested if they have a sore throat or fever. They may need an antibiotic if they have strep throat.  Contact a health care provider if:  · The glands in your neck continue to get bigger.  · You develop a rash, cough, or earache.  · You cough up a thick mucus that is green, yellow-brown, or bloody.  · You have pain or discomfort that does not get better with medicine.  · Your symptoms seem to be getting worse and not better.  · You have a fever.  Get help right away if:  · You have new symptoms, such as  vomiting, severe headache, stiff or painful neck, chest pain, or shortness of breath.  · You have severe throat pain, drooling, or changes in your voice.  · You have swelling of the neck, or the skin on the neck becomes red and tender.  · You have signs of dehydration, such as tiredness (fatigue), dry mouth, and decreased urination.  · You become increasingly sleepy, or you cannot wake up completely.  · Your joints become red or painful.  Summary  · Strep throat is an infection in the throat that is caused by the Streptococcus pyogenes bacteria. This infection is spread from person to person (is contagious) through coughing, sneezing, or having close contact.  · Take your medicines, including antibiotics, as told by your health care provider. Do not stop taking the antibiotic even if you start to feel better.  · To prevent the spread of germs, wash your hands well with soap and water. Have others do the same. Do not share food, drinking cups, or personal items.  · Get help right away if you have new symptoms, such as vomiting, severe headache, stiff or painful neck, chest pain, or shortness of breath.  This information is not intended to replace advice given to you by your health care provider. Make sure you discuss any questions you have with your health care provider.  Document Revised: 03/06/2020 Document Reviewed: 03/06/2020  [employer] Patient Education © 2021 [employer]

## 2021-06-11 NOTE — PROGRESS NOTES
CHIEF COMPLAINT  Chief Complaint   Patient presents with   • Sore Throat         HPI  Leona Murillo is a 25 y.o. female  presents with complaint of 1 day history of sore throat with painful swallowing and swollen glands in neck  Denies fever.  Had tonsils removed at age of 15.    Review of Systems   Constitutional: Negative for activity change and appetite change.   HENT: Positive for sore throat.    Neurological: Positive for headaches. Negative for dizziness.   All other systems reviewed and are negative.      Past Medical History:   Diagnosis Date   • Gestational diabetes     w/ first 3 pregnancy   • Obesity    • PCOS (polycystic ovarian syndrome)        Family History   Problem Relation Age of Onset   • Diabetes Father    • Heart disease Father         chf   • Heart attack Father    • Diabetes Mother    • Hypertension Mother    • Heart disease Paternal Grandfather        Social History     Socioeconomic History   • Marital status:      Spouse name: Not on file   • Number of children: Not on file   • Years of education: Not on file   • Highest education level: Not on file   Tobacco Use   • Smoking status: Former Smoker     Packs/day: 0.25     Years: 2.00     Pack years: 0.50     Quit date: 2021     Years since quittin.1   • Smokeless tobacco: Never Used   Vaping Use   • Vaping Use: Never used   Substance and Sexual Activity   • Alcohol use: Yes     Comment: Socially    • Drug use: No   • Sexual activity: Never         There were no vitals taken for this visit.    PHYSICAL EXAM  Physical Exam   Constitutional: She is oriented to person, place, and time. She appears well-developed and well-nourished. She appears ill.   HENT:   Head: Normocephalic and atraumatic.   Pulmonary/Chest: Effort normal.  No respiratory distress.  Neurological: She is alert and oriented to person, place, and time.       Results for orders placed or performed in visit on 21   Urine Culture - Urine, Urine, Clean Catch     Specimen: Urine, Clean Catch    UC   Result Value Ref Range    Urine Culture Final report     Result 1 Comment    POCT urinalysis dipstick, automated    Specimen: Urine   Result Value Ref Range    Color Yellow Yellow, Straw, Dark Yellow, Shara    Clarity, UA Cloudy (A) Clear    Specific Gravity  1.020 1.005 - 1.030    pH, Urine 6.0 5.0 - 8.0    Leukocytes Negative Negative    Nitrite, UA Negative Negative    Protein, POC Negative Negative mg/dL    Glucose, UA Negative Negative, 1000 mg/dL (3+) mg/dL    Ketones, UA Negative Negative    Urobilinogen, UA Normal Normal    Bilirubin Negative Negative    Blood, UA Negative Negative       Diagnoses and all orders for this visit:    1. Strep pharyngitis (Primary)  -     azithromycin (Zithromax Z-Macario) 250 MG tablet; Take 2 tablets by mouth on day 1, then 1 tablet daily on days 2-5  Dispense: 6 tablet; Refill: 0  -     methylPREDNISolone (MEDROL) 4 MG dose pack; Take as directed on package instructions.  Dispense: 21 tablet; Refill: 0    --discussed importance of completing Z pack and Medrol dose pack as prescribed  --advised--ibuprofen for pain; warm salt water gargles; throat lozenges or sprays;   --if no improvement in 3-5 days, will need follow-up for further evaluation      FOLLOW-UP  As discussed during visit with PCP/[facility]] Care if no improvement or Urgent Care/Emergency Department if worsening of symptoms    Patient verbalizes understanding of medication dosage, comfort measures, instructions for treatment and follow-up.    ARAM Serrato  06/11/2021  10:25 EDT    This visit was performed via Telehealth.  This patient has been instructed to follow-up with their primary care provider if their symptoms worsen or the treatment provided does not resolve their illness.

## 2021-10-02 ENCOUNTER — TELEMEDICINE (OUTPATIENT)
Dept: FAMILY MEDICINE CLINIC | Facility: TELEHEALTH | Age: 26
End: 2021-10-02

## 2021-10-02 DIAGNOSIS — H10.32 ACUTE CONJUNCTIVITIS OF LEFT EYE, UNSPECIFIED ACUTE CONJUNCTIVITIS TYPE: Primary | ICD-10-CM

## 2021-10-02 PROCEDURE — 99213 OFFICE O/P EST LOW 20 MIN: CPT | Performed by: NURSE PRACTITIONER

## 2021-10-02 RX ORDER — POLYMYXIN B SULFATE AND TRIMETHOPRIM 1; 10000 MG/ML; [USP'U]/ML
1 SOLUTION OPHTHALMIC EVERY 4 HOURS
Qty: 10 ML | Refills: 0 | Status: SHIPPED | OUTPATIENT
Start: 2021-10-02 | End: 2022-08-04

## 2021-10-02 NOTE — PATIENT INSTRUCTIONS
Bacterial Conjunctivitis, Adult  Bacterial conjunctivitis is an infection of your conjunctiva. This is the clear membrane that covers the white part of your eye and the inner part of your eyelid. This infection can make your eye:  · Red or pink.  · Itchy.  This condition spreads easily from person to person (is contagious) and from one eye to the other eye.  What are the causes?  · This condition is caused by germs (bacteria). You may get the infection if you come into close contact with:  ? A person who has the infection.  ? Items that have germs on them (are contaminated), such as face towels, contact lens solution, or eye makeup.  What increases the risk?  You are more likely to get this condition if you:  · Have contact with people who have the infection.  · Wear contact lenses.  · Have a sinus infection.  · Have had a recent eye injury or surgery.  · Have a weak body defense system (immune system).  · Have dry eyes.  What are the signs or symptoms?    · Thick, yellowish discharge from the eye.  · Tearing or watery eyes.  · Itchy eyes.  · Burning feeling in your eyes.  · Eye redness.  · Swollen eyelids.  · Blurred vision.  How is this treated?    · Antibiotic eye drops or ointment.  · Antibiotic medicine taken by mouth. This is used for infections that do not get better with drops or ointment or that last more than 10 days.  · Cool, wet cloths placed on the eyes.  · Artificial tears used 2-6 times a day.  Follow these instructions at home:  Medicines  · Take or apply your antibiotic medicine as told by your doctor. Do not stop taking or applying the antibiotic even if you start to feel better.  · Take or apply over-the-counter and prescription medicines only as told by your doctor.  · Do not touch your eyelid with the eye-drop bottle or the ointment tube.  Managing discomfort  · Wipe any fluid from your eye with a warm, wet washcloth or a cotton ball.  · Place a clean, cool, wet cloth on your eye. Do this for  10-20 minutes, 3-4 times per day.  General instructions  · Do not wear contacts until the infection is gone. Wear glasses until your doctor says it is okay to wear contacts again.  · Do not wear eye makeup until the infection is gone. Throw away old eye makeup.  · Change or wash your pillowcase every day.  · Do not share towels or washcloths.  · Wash your hands often with soap and water. Use paper towels to dry your hands.  · Do not touch or rub your eyes.  · Do not drive or use heavy machinery if your vision is blurred.  Contact a doctor if:  · You have a fever.  · You do not get better after 10 days.  Get help right away if:  · You have a fever and your symptoms get worse all of a sudden.  · You have very bad pain when you move your eye.  · Your face:  ? Hurts.  ? Is red.  ? Is swollen.  · You have sudden loss of vision.  Summary  · Bacterial conjunctivitis is an infection of your conjunctiva.  · This infection spreads easily from person to person.  · Wash your hands often with soap and water. Use paper towels to dry your hands.  · Take or apply your antibiotic medicine as told by your doctor.  · Contact a doctor if you have a fever or you do not get better after 10 days.  This information is not intended to replace advice given to you by your health care provider. Make sure you discuss any questions you have with your health care provider.  Document Revised: 04/07/2020 Document Reviewed: 07/24/2019  [employer] Patient Education © 2021 [employer]

## 2021-10-02 NOTE — PROGRESS NOTES
CHIEF COMPLAINT  Chief Complaint   Patient presents with   • Conjunctivitis     left eye         HPI  Leona Murillo is a 26 y.o. female  presents with complaint of new onset of left eye redness with yellowish discharge first noticed this morning when she woke. She reports no pain just an irritated feeling in her eye. She reports no swelling. She reports this is only affecting the left eye at present. No other family members are affected.     Review of Systems   Constitutional: Negative.    HENT: Negative.    Eyes: Positive for discharge (yellow discharge in the morning upon awakening) and redness. Negative for photophobia, pain, itching and visual disturbance.        Left eye    Respiratory: Negative.    Skin: Negative.    Neurological: Negative.    Psychiatric/Behavioral: Negative.        Past Medical History:   Diagnosis Date   • Gestational diabetes     w/ first 3 pregnancy   • Obesity    • PCOS (polycystic ovarian syndrome)        Family History   Problem Relation Age of Onset   • Diabetes Father    • Heart disease Father         chf   • Heart attack Father    • Diabetes Mother    • Hypertension Mother    • Heart disease Paternal Grandfather        Social History     Socioeconomic History   • Marital status:      Spouse name: Not on file   • Number of children: Not on file   • Years of education: Not on file   • Highest education level: Not on file   Tobacco Use   • Smoking status: Former Smoker     Packs/day: 0.25     Years: 2.00     Pack years: 0.50     Quit date: 2021     Years since quittin.4   • Smokeless tobacco: Never Used   Vaping Use   • Vaping Use: Never used   Substance and Sexual Activity   • Alcohol use: Yes     Comment: Socially    • Drug use: No   • Sexual activity: Never         There were no vitals taken for this visit.    PHYSICAL EXAM  Physical Exam   Constitutional: She is oriented to person, place, and time. She appears well-developed and well-nourished. She does not have a  sickly appearance. She does not appear ill. No distress.   HENT:   Head: Normocephalic and atraumatic.   Eyes: EOM and lids are normal. Right eye exhibits no discharge, no exudate, no hordeolum and no edema. No foreign body present in the right eye. Left eye exhibits discharge and exudate. Left eye exhibits no hordeolum and no edema. No foreign body present in the left eye. Right conjunctiva is not injected. Right conjunctiva has no hemorrhage. Left conjunctiva is injected. Left conjunctiva has no hemorrhage. No scleral icterus.   Cardiovascular:   No conjunctival pallor noted.    Neurological: She is alert and oriented to person, place, and time.   Skin: No erythema.   Psychiatric: She has a normal mood and affect.   Vitals reviewed.      Results for orders placed or performed in visit on 06/02/21   Urine Culture - Urine, Urine, Clean Catch    Specimen: Urine, Clean Catch    UC   Result Value Ref Range    Urine Culture Final report     Result 1 Comment    POCT urinalysis dipstick, automated    Specimen: Urine   Result Value Ref Range    Color Yellow Yellow, Straw, Dark Yellow, Shara    Clarity, UA Cloudy (A) Clear    Specific Gravity  1.020 1.005 - 1.030    pH, Urine 6.0 5.0 - 8.0    Leukocytes Negative Negative    Nitrite, UA Negative Negative    Protein, POC Negative Negative mg/dL    Glucose, UA Negative Negative, 1000 mg/dL (3+) mg/dL    Ketones, UA Negative Negative    Urobilinogen, UA Normal Normal    Bilirubin Negative Negative    Blood, UA Negative Negative       Diagnoses and all orders for this visit:    1. Acute conjunctivitis of left eye, unspecified acute conjunctivitis type (Primary)  -     trimethoprim-polymyxin b (Polytrim) 44232-3.1 UNIT/ML-% ophthalmic solution; Administer 1 drop into the left eye Every 4 (Four) Hours.  Dispense: 10 mL; Refill: 0    good hand washing before and after instilling eye drops.   Avoid touching right eye   Avoid sharing towels and wash cloths   Warm soaks prn discharge  and exudate.   For worsening s/s follow up with [facility] or ophthalmology  Wear glasses no contacts until symptoms resolve.       FOLLOW-UP  As discussed during visit with PCP/[facility] if no improvement or Urgent Care/Emergency Department if worsening of symptoms    Patient verbalizes understanding of medication dosage, comfort measures, instructions for treatment and follow-up.    Edita Sena, ARAM  10/02/2021  08:44 EDT    This visit was performed via Telehealth.  This patient has been instructed to follow-up with their primary care provider if their symptoms worsen or the treatment provided does not resolve their illness.

## 2022-08-04 ENCOUNTER — TELEMEDICINE (OUTPATIENT)
Dept: FAMILY MEDICINE CLINIC | Facility: TELEHEALTH | Age: 27
End: 2022-08-04

## 2022-08-04 DIAGNOSIS — R39.89 SUSPECTED UTI: Primary | ICD-10-CM

## 2022-08-04 PROCEDURE — 99213 OFFICE O/P EST LOW 20 MIN: CPT | Performed by: NURSE PRACTITIONER

## 2022-08-04 RX ORDER — DULOXETIN HYDROCHLORIDE 30 MG/1
CAPSULE, DELAYED RELEASE ORAL
COMMUNITY
Start: 2022-07-13 | End: 2022-08-09

## 2022-08-04 RX ORDER — HYDROXYZINE HYDROCHLORIDE 25 MG/1
TABLET, FILM COATED ORAL
COMMUNITY
Start: 2022-07-13 | End: 2022-08-09

## 2022-08-04 RX ORDER — PHENAZOPYRIDINE HYDROCHLORIDE 200 MG/1
200 TABLET, FILM COATED ORAL 3 TIMES DAILY PRN
Qty: 6 TABLET | Refills: 0 | Status: SHIPPED | OUTPATIENT
Start: 2022-08-04 | End: 2022-08-06

## 2022-08-04 RX ORDER — IBUPROFEN 400 MG/1
400 TABLET ORAL EVERY 6 HOURS PRN
COMMUNITY
End: 2022-09-19

## 2022-08-04 RX ORDER — NITROFURANTOIN 25; 75 MG/1; MG/1
100 CAPSULE ORAL 2 TIMES DAILY
Qty: 14 CAPSULE | Refills: 0 | Status: SHIPPED | OUTPATIENT
Start: 2022-08-04 | End: 2022-08-11

## 2022-08-04 RX ORDER — LIDOCAINE HYDROCHLORIDE 20 MG/ML
JELLY TOPICAL
COMMUNITY
End: 2022-08-09

## 2022-08-04 RX ORDER — HYDROCODONE BITARTRATE AND ACETAMINOPHEN 5; 325 MG/1; MG/1
TABLET ORAL
COMMUNITY
End: 2022-08-09

## 2022-08-04 NOTE — PROGRESS NOTES
You have chosen to receive care through a telehealth visit.  Do you consent to use a video/audio connection for your medical care today? Yes     CHIEF COMPLAINT  No chief complaint on file.        HPI  Leona Murillo is a 26 y.o. female  presents with complaint of 1 day history of dysuria, frequency, urgency, fatigue, low back pain.  She denies fever/chills, n/v, belly pain, hematuria, or vaginal symptoms.  She has a history of UTIs which typically resolve with antibiotic treatment    Review of Systems   Constitutional: Negative for fever.   Gastrointestinal: Negative for abdominal pain.   Genitourinary: Positive for dysuria, frequency and urgency. Negative for flank pain and hematuria.   Musculoskeletal: Positive for back pain.   All other systems reviewed and are negative.      Past Medical History:   Diagnosis Date   • Gestational diabetes     w/ first 3 pregnancy   • Obesity    • PCOS (polycystic ovarian syndrome)        Family History   Problem Relation Age of Onset   • Diabetes Father    • Heart disease Father         chf   • Heart attack Father    • Diabetes Mother    • Hypertension Mother    • Heart disease Paternal Grandfather        Social History     Socioeconomic History   • Marital status:    Tobacco Use   • Smoking status: Former Smoker     Packs/day: 0.25     Years: 2.00     Pack years: 0.50     Quit date: 2021     Years since quittin.2   • Smokeless tobacco: Never Used   Vaping Use   • Vaping Use: Never used   Substance and Sexual Activity   • Alcohol use: Yes     Comment: Socially    • Drug use: No   • Sexual activity: Never       Leona Murillo  reports that she quit smoking about 15 months ago. She has a 0.50 pack-year smoking history. She has never used smokeless tobacco..              There were no vitals taken for this visit.    PHYSICAL EXAM  Physical Exam   Constitutional: She is oriented to person, place, and time. She appears well-developed and well-nourished. She does not  have a sickly appearance. She does not appear ill.   HENT:   Head: Normocephalic and atraumatic.   Pulmonary/Chest: Effort normal.  No respiratory distress.  Neurological: She is alert and oriented to person, place, and time.         Diagnoses and all orders for this visit:    1. Suspected UTI (Primary)  -     nitrofurantoin, macrocrystal-monohydrate, (MACROBID) 100 MG capsule; Take 1 capsule by mouth 2 (Two) Times a Day for 7 days.  Dispense: 14 capsule; Refill: 0  -     phenazopyridine (PYRIDIUM) 200 MG tablet; Take 1 tablet by mouth 3 (Three) Times a Day As Needed for Bladder Spasms for up to 2 days.  Dispense: 6 tablet; Refill: 0    --take medications as prescribed  --increase water intake  --f/u in 2-3 days if no improvement      FOLLOW-UP  As discussed during visit with PCP/[facility] if no improvement or Urgent Care/Emergency Department if worsening of symptoms    Patient verbalizes understanding of medication dosage, comfort measures, instructions for treatment and follow-up.    ARAM Serrato  08/04/2022  08:23 EDT    The use of a video visit has been reviewed with the patient and verbal informed consent has been obtained. Myself and Leona Murillo participated in this visit. The patient is located in [address] [place] [address].    I am located in [place], [place]. Mychart and Zoom were utilized. I spent 20 minutes in the patient's chart for this visit.

## 2022-08-09 ENCOUNTER — OFFICE VISIT (OUTPATIENT)
Dept: INTERNAL MEDICINE | Facility: CLINIC | Age: 27
End: 2022-08-09

## 2022-08-09 VITALS
WEIGHT: 229 LBS | SYSTOLIC BLOOD PRESSURE: 132 MMHG | BODY MASS INDEX: 42.14 KG/M2 | OXYGEN SATURATION: 100 % | HEIGHT: 62 IN | HEART RATE: 78 BPM | TEMPERATURE: 98.6 F | DIASTOLIC BLOOD PRESSURE: 71 MMHG | RESPIRATION RATE: 16 BRPM

## 2022-08-09 DIAGNOSIS — R30.0 DYSURIA: ICD-10-CM

## 2022-08-09 DIAGNOSIS — F41.0 PANIC ATTACKS: Primary | ICD-10-CM

## 2022-08-09 DIAGNOSIS — F41.9 ANXIETY: ICD-10-CM

## 2022-08-09 DIAGNOSIS — R00.0 RACING HEART BEAT: ICD-10-CM

## 2022-08-09 DIAGNOSIS — E34.9 HORMONAL DISORDER: ICD-10-CM

## 2022-08-09 LAB
BILIRUB BLD-MCNC: NEGATIVE MG/DL
CLARITY, POC: ABNORMAL
COLOR UR: YELLOW
EXPIRATION DATE: ABNORMAL
GLUCOSE UR STRIP-MCNC: NEGATIVE MG/DL
KETONES UR QL: NEGATIVE
LEUKOCYTE EST, POC: NEGATIVE
Lab: ABNORMAL
NITRITE UR-MCNC: NEGATIVE MG/ML
PH UR: 7 [PH] (ref 5–8)
PROT UR STRIP-MCNC: NEGATIVE MG/DL
RBC # UR STRIP: NEGATIVE /UL
SP GR UR: 1.01 (ref 1–1.03)
UROBILINOGEN UR QL: NORMAL

## 2022-08-09 PROCEDURE — 99214 OFFICE O/P EST MOD 30 MIN: CPT | Performed by: NURSE PRACTITIONER

## 2022-08-09 NOTE — PROGRESS NOTES
"  Office Visit      Patient Name: Leona Murillo  : 1995   MRN: [id number]   Care Team: Patient Care Team:  Dorinda Marin MD as PCP - General (Family Medicine)    Chief Complaint  Anxiety (Panic attacks since , began after Mirena removal)    Subjective     Subjective      Leona Murillo presents to [facility] for anxiety/panic attacks.  Symptoms initially began 2022 since the removal of her Mirena. They are worsening.   Reports panic attacks. Come on randomly. Heart will race, short of breath, feels \"off\", pending doom.  Was prescribed Cymbalta and hydroxyzine from historical provider but never tried it and states that provider just left the practice so she could not f/u with them.   She would like to remain off of medications and feels as though it is more of a hormonal imbalance, as she has not had anxiety or panic attacks before.   Has been treated previously with Wellbutrin, unsure why stopped taking.   Currently going to counseling regularly.   Patient requesting a full workup up labs including hormones to see if there is an imbalance before starting any anxiety medications.   Interested in genesight testing as well before any medical treatment.   PHQ-9 Total Score: 1  BARBER 7 Total Score: 15    Patient is also having some \"irritation\" and \"dryness\" in vaginal area. Slight dysuria. No hematuria, odor to urine, abnormal discharge, itching.     Review of Systems   Respiratory: Positive for shortness of breath.    Psychiatric/Behavioral: Positive for agitation and stress. Negative for dysphoric mood, self-injury, sleep disturbance, suicidal ideas and depressed mood. The patient is nervous/anxious.        Objective     Objective   Vital Signs:   /71   Pulse 78   Temp 98.6 °F (37 °C) (Temporal)   Resp 16   Ht 157.5 cm (62\")   Wt 104 kg (229 lb)   SpO2 100%   BMI 41.88 kg/m²     Physical Exam  Vitals and nursing note reviewed.   Constitutional:  "      Appearance: Normal appearance.   HENT:      Head: Normocephalic and atraumatic.   Eyes:      Extraocular Movements: Extraocular movements intact.   Cardiovascular:      Rate and Rhythm: Normal rate and regular rhythm.   Pulmonary:      Effort: Pulmonary effort is normal.      Breath sounds: Normal breath sounds.   Musculoskeletal:         General: Normal range of motion.      Cervical back: Normal range of motion.   Skin:     General: Skin is dry.   Neurological:      General: No focal deficit present.      Mental Status: She is alert and oriented to person, place, and time.   Psychiatric:         Attention and Perception: Attention normal.         Mood and Affect: Mood is anxious.         Speech: Speech normal.         Behavior: Behavior normal. Behavior is cooperative.         Thought Content: Thought content normal.         Judgment: Judgment normal.          Assessment / Plan      Assessment & Plan   Problem List Items Addressed This Visit    None     Visit Diagnoses     Panic attacks    -  Primary    Relevant Orders    DHEA-sulfate    Follicle stimulating hormone    Luteinizing hormone    Prolactin    Testosterone Free Direct    TSH    CBC No Differential    T4, free    17-Hydroxyprogesterone    Iron    Vitamin B12 and Folate    Ferritin    CBC and Differential    GeneSight - Swab,    Anxiety        Relevant Orders    DHEA-sulfate    Follicle stimulating hormone    Luteinizing hormone    Prolactin    Testosterone Free Direct    TSH    T4, free    17-Hydroxyprogesterone    GeneSight - Swab,    Racing heart beat        Relevant Orders    TSH    CBC No Differential    T4, free    Iron    Vitamin B12 and Folate    Ferritin    CBC and Differential    Hormonal disorder        Relevant Orders    DHEA-sulfate    Follicle stimulating hormone    Luteinizing hormone    Prolactin    Testosterone Free Direct    TSH    CBC No Differential    T4, free    17-Hydroxyprogesterone    Dysuria        Relevant Orders    NuSwab  BV & Candida - Swab, Vagina    POCT urinalysis dipstick, automated (Completed)    Urine Culture - Urine, Urine, Clean Catch           Lab workup ordered to rule out causes for sudden anxiety/panic and change in mood.  Patient declines any other changes in life other than removal of Mirena which precipitated symptoms.  Declines medication options at this time. Wants to see what labs look like first.  Genesight test collected for future reference.  Continue to see counselor.  UA is negative for UTI. Will send out for culture and test for vaginal yeast infection or bacterial vaginosis.   These results will take a few days to return. Push lots of water in meantime. Urine was a little cloudy.     I spent 35 minutes caring for Leona Murillo on this date of service. This time includes time spent by me in the following activities: preparing for the visit, reviewing tests, performing a medically appropriate examination and/or evaluation, counseling and educating the patient/family/caregiver, ordering medications, tests, or procedures and documenting information in the medical record.      Follow Up based on lab results   Patient was given instructions and counseling regarding her condition or for health maintenance advice. Please see specific information pulled into the AVS if appropriate.     ARAM Frank  [facility]

## 2022-08-11 DIAGNOSIS — E28.2 PCOS (POLYCYSTIC OVARIAN SYNDROME): Primary | ICD-10-CM

## 2022-08-11 LAB
A VAGINAE DNA VAG QL NAA+PROBE: NORMAL SCORE
BVAB2 DNA VAG QL NAA+PROBE: NORMAL SCORE
C ALBICANS DNA VAG QL NAA+PROBE: NEGATIVE
C GLABRATA DNA VAG QL NAA+PROBE: NEGATIVE
MEGA1 DNA VAG QL NAA+PROBE: NORMAL SCORE

## 2022-08-11 RX ORDER — METFORMIN HYDROCHLORIDE 500 MG/1
500 TABLET, EXTENDED RELEASE ORAL
Qty: 90 TABLET | Refills: 0 | Status: SHIPPED | OUTPATIENT
Start: 2022-08-11 | End: 2022-10-13

## 2022-08-12 LAB
17OHP SERPL-MCNC: 24 NG/DL
BASOPHILS # BLD AUTO: 0.06 10*3/MM3 (ref 0–0.2)
BASOPHILS NFR BLD AUTO: 0.8 % (ref 0–1.5)
DHEA-S SERPL-MCNC: 524 UG/DL (ref 84.8–378)
EOSINOPHIL # BLD AUTO: 0.17 10*3/MM3 (ref 0–0.4)
EOSINOPHIL NFR BLD AUTO: 2.1 % (ref 0.3–6.2)
ERYTHROCYTE [DISTWIDTH] IN BLOOD BY AUTOMATED COUNT: 11.8 % (ref 12.3–15.4)
FERRITIN SERPL-MCNC: 36.4 NG/ML (ref 13–150)
FOLATE SERPL-MCNC: 12.9 NG/ML (ref 4.78–24.2)
FSH SERPL-ACNC: 6.1 MIU/ML
HCT VFR BLD AUTO: 40.4 % (ref 34–46.6)
HGB BLD-MCNC: 13.4 G/DL (ref 12–15.9)
IMM GRANULOCYTES # BLD AUTO: 0.01 10*3/MM3 (ref 0–0.05)
IMM GRANULOCYTES NFR BLD AUTO: 0.1 % (ref 0–0.5)
IRON SERPL-MCNC: 65 MCG/DL (ref 37–145)
LH SERPL-ACNC: 8 MIU/ML
LYMPHOCYTES # BLD AUTO: 2.76 10*3/MM3 (ref 0.7–3.1)
LYMPHOCYTES NFR BLD AUTO: 34.5 % (ref 19.6–45.3)
MCH RBC QN AUTO: 31.2 PG (ref 26.6–33)
MCHC RBC AUTO-ENTMCNC: 33.2 G/DL (ref 31.5–35.7)
MCV RBC AUTO: 94 FL (ref 79–97)
MONOCYTES # BLD AUTO: 0.49 10*3/MM3 (ref 0.1–0.9)
MONOCYTES NFR BLD AUTO: 6.1 % (ref 5–12)
NEUTROPHILS # BLD AUTO: 4.5 10*3/MM3 (ref 1.7–7)
NEUTROPHILS NFR BLD AUTO: 56.4 % (ref 42.7–76)
NRBC BLD AUTO-RTO: 0 /100 WBC (ref 0–0.2)
PLATELET # BLD AUTO: 265 10*3/MM3 (ref 140–450)
PROLACTIN SERPL-MCNC: 4.7 NG/ML (ref 4.8–23.3)
RBC # BLD AUTO: 4.3 10*6/MM3 (ref 3.77–5.28)
T4 FREE SERPL-MCNC: 1.36 NG/DL (ref 0.93–1.7)
TESTOST FREE SERPL-MCNC: 4.7 PG/ML (ref 0–4.2)
TSH SERPL DL<=0.005 MIU/L-ACNC: 1.13 UIU/ML (ref 0.27–4.2)
VIT B12 SERPL-MCNC: 319 PG/ML (ref 211–946)
WBC # BLD AUTO: 7.99 10*3/MM3 (ref 3.4–10.8)

## 2022-08-13 NOTE — PROGRESS NOTES
Negative for bacterial vaginosis/yeast infection.   DHEA and testosterone elevated - as we discussed via MyChart. Continue metformin.  All other labs good.

## 2022-08-26 ENCOUNTER — TELEMEDICINE (OUTPATIENT)
Dept: FAMILY MEDICINE CLINIC | Facility: TELEHEALTH | Age: 27
End: 2022-08-26

## 2022-08-26 DIAGNOSIS — J02.9 ACUTE PHARYNGITIS, UNSPECIFIED ETIOLOGY: Primary | ICD-10-CM

## 2022-08-26 DIAGNOSIS — Z20.818 EXPOSURE TO STREP THROAT: ICD-10-CM

## 2022-08-26 PROCEDURE — 99213 OFFICE O/P EST LOW 20 MIN: CPT | Performed by: NURSE PRACTITIONER

## 2022-08-26 RX ORDER — AZITHROMYCIN 250 MG/1
TABLET, FILM COATED ORAL
Qty: 6 TABLET | Refills: 0 | Status: SHIPPED | OUTPATIENT
Start: 2022-08-26 | End: 2022-09-19

## 2022-08-26 RX ORDER — FLUCONAZOLE 150 MG/1
150 TABLET ORAL ONCE
Qty: 1 TABLET | Refills: 0 | Status: SHIPPED | OUTPATIENT
Start: 2022-08-26 | End: 2022-08-26

## 2022-08-26 NOTE — PROGRESS NOTES
HPI  Leona Murillo is a 27 y.o. female  presents with complaint of sore throat since yesterday. Children recently tested positive for strep. Throat feels like sandpaper and neck feels swollen. Denies fever, SOA, CP, headache, N/V/D. Has not taken any meds for symptoms yet.     Review of Systems    Past Medical History:   Diagnosis Date   • Gestational diabetes     w/ first 3 pregnancy   • Obesity    • PCOS (polycystic ovarian syndrome)        Family History   Problem Relation Age of Onset   • Diabetes Father    • Heart disease Father         chf   • Heart attack Father    • Diabetes Mother    • Hypertension Mother    • Heart disease Paternal Grandfather        Social History     Socioeconomic History   • Marital status:    Tobacco Use   • Smoking status: Former Smoker     Packs/day: 0.25     Years: 2.00     Pack years: 0.50     Quit date: 2021     Years since quittin.3   • Smokeless tobacco: Never Used   Vaping Use   • Vaping Use: Never used   Substance and Sexual Activity   • Alcohol use: Yes     Comment: Socially    • Drug use: No   • Sexual activity: Never         There were no vitals taken for this visit.    PHYSICAL EXAM  Physical Exam   Constitutional: She appears well-developed and well-nourished.   HENT:   Head: Normocephalic.   Nose: Nose normal.   Neck: Neck normal appearance.  Pulmonary/Chest: Effort normal.   Lymphadenopathy:   Pt reports neck feels swollen and tender   Neurological: She is alert.   Psychiatric: She has a normal mood and affect. Her speech is normal.       Diagnoses and all orders for this visit:    1. Acute pharyngitis, unspecified etiology (Primary)  -     azithromycin (Zithromax Z-Macario) 250 MG tablet; Take 2 tablets the first day, then 1 tablet daily for 4 days.  Dispense: 6 tablet; Refill: 0  -     fluconazole (Diflucan) 150 MG tablet; Take 1 tablet by mouth 1 (One) Time for 1 dose.  Dispense: 1 tablet; Refill: 0    2. Exposure to strep throat          FOLLOW-UP  As  discussed during visit with [facility], if symptoms worsen or fail to improve, follow-up with PCP/Urgent Care/Emergency Department.    Patient verbalizes understanding of medications, instructions for treatment and follow-up.    Zoraida Bro, APRN  08/26/2022  07:27 EDT    The use of a video visit has been reviewed with the patient and verbal informed consent has been obtained. Myself and Leona Murillo participated in this visit. The patient is located in [place], [place], and I am located in [place], [place]. MyChart and Zoom were utilized.

## 2022-09-19 ENCOUNTER — TELEMEDICINE (OUTPATIENT)
Dept: FAMILY MEDICINE CLINIC | Facility: TELEHEALTH | Age: 27
End: 2022-09-19

## 2022-09-19 DIAGNOSIS — J02.0 STREP THROAT: Primary | ICD-10-CM

## 2022-09-19 PROCEDURE — 99213 OFFICE O/P EST LOW 20 MIN: CPT | Performed by: NURSE PRACTITIONER

## 2022-09-19 RX ORDER — FLUCONAZOLE 150 MG/1
TABLET ORAL
COMMUNITY
Start: 2022-08-26 | End: 2022-10-13

## 2022-09-19 RX ORDER — AZITHROMYCIN 250 MG/1
TABLET, FILM COATED ORAL
Qty: 6 TABLET | Refills: 0 | Status: SHIPPED | OUTPATIENT
Start: 2022-09-19 | End: 2022-10-13

## 2022-09-19 NOTE — PATIENT INSTRUCTIONS
Strep Throat, Adult  Strep throat is an infection in the throat that is caused by bacteria. It is common during the cold months of the year. It mostly affects children who are 5-15 years old. However, people of all ages can get it at any time of the year. This infection spreads from person to person (is contagious) through coughing, sneezing, or having close contact.  Your health care provider may use other names to describe the infection. When strep throat affects the tonsils, it is called tonsillitis. When it affects the back of the throat, it is called pharyngitis.  What are the causes?  This condition is caused by the Streptococcus pyogenes bacteria.  What increases the risk?  You are more likely to develop this condition if:  You care for school-age children, or are around school-age children. Children are more likely to get strep throat and may spread it to others.  You spend time in crowded places where the infection can spread easily.  You have close contact with someone who has strep throat.  What are the signs or symptoms?  Symptoms of this condition include:  Fever or chills.  Redness, swelling, or pain in the tonsils or throat.  Pain or difficulty when swallowing.  White or yellow spots on the tonsils or throat.  Tender glands in the neck and under the jaw.  Bad smelling breath.  Red rash all over the body. This is rare.  How is this diagnosed?  This condition is diagnosed by tests that check for the presence and the amount of bacteria that cause strep throat. They are:  Rapid strep test. Your throat is swabbed and checked for the presence of bacteria. Results are usually ready in minutes.  Throat culture test. Your throat is swabbed. The sample is placed in a cup that allows infections to grow. Results are usually ready in 1 or 2 days.  How is this treated?  This condition may be treated with:  Medicines that kill germs (antibiotics).  Medicines that relieve pain or fever. These include:  Ibuprofen or  acetaminophen.  Aspirin, only for people who are over the age of 18.  Throat lozenges.  Throat sprays.  Follow these instructions at home:  Medicines    Take over-the-counter and prescription medicines only as told by your health care provider.  Take your antibiotic medicine as told by your health care provider. Do not stop taking the antibiotic even if you start to feel better.  Eating and drinking    If you have trouble swallowing, try eating soft foods until your sore throat feels better.  Drink enough fluid to keep your urine pale yellow.  To help relieve pain, you may have:  Warm fluids, such as soup and tea.  Cold fluids, such as frozen desserts or popsicles.  General instructions  Gargle with a salt-water mixture 3-4 times a day or as needed. To make a salt-water mixture, completely dissolve ½-1 tsp (3-6 g) of salt in 1 cup (237 mL) of warm water.  Get plenty of rest.  Stay home from work or school until you have been taking antibiotics for 24 hours.  Do not use any products that contain nicotine or tobacco. These products include cigarettes, chewing tobacco, and vaping devices, such as e-cigarettes. If you need help quitting, ask your health care provider.  It is up to you to get your test results. Ask your health care provider, or the department that is doing the test, when your results will be ready.  Keep all follow-up visits. This is important.  How is this prevented?    Do not share food, drinking cups, or personal items that could cause the infection to spread to other people.  Wash your hands often with soap and water for at least 20 seconds. If soap and water are not available, use hand . Make sure that all people in your house wash their hands well.  Have family members tested if they have a sore throat or fever. They may need an antibiotic if they have strep throat.  Contact a health care provider if:  You have swelling in your neck that keeps getting bigger.  You develop a rash, cough, or  earache.  You cough up a thick mucus that is green, yellow-brown, or bloody.  You have pain or discomfort that does not get better with medicine.  Your symptoms seem to be getting worse.  You have a fever.  Get help right away if:  You have new symptoms, such as vomiting, severe headache, stiff or painful neck, chest pain, or shortness of breath.  You have severe throat pain, drooling, or changes in your voice.  You have swelling of the neck, or the skin on the neck becomes red and tender.  You have signs of dehydration, such as tiredness (fatigue), dry mouth, and decreased urination.  You become increasingly sleepy, or you cannot wake up completely.  Your joints become red or painful.  These symptoms may represent a serious problem that is an emergency. Do not wait to see if the symptoms will go away. Get medical help right away. Call your local emergency services (911 in the [place] Do not drive yourself to the hospital.  Summary  Strep throat is an infection in the throat that is caused by the Streptococcus pyogenes bacteria. This infection is spread from person to person (is contagious) through coughing, sneezing, or having close contact.  Take your medicines, including antibiotics, as told by your health care provider. Do not stop taking the antibiotic even if you start to feel better.  To prevent the spread of germs, wash your hands well with soap and water. Have others do the same. Do not share food, drinking cups, or personal items.  Get help right away if you have new symptoms, such as vomiting, severe headache, stiff or painful neck, chest pain, or shortness of breath.  This information is not intended to replace advice given to you by your health care provider. Make sure you discuss any questions you have with your health care provider.  Document Revised: 04/12/2022 Document Reviewed: 04/12/2022  Elsevier Patient Education © 2022 [employer]

## 2022-09-19 NOTE — PROGRESS NOTES
You have chosen to receive care through a telehealth visit.  Do you consent to use a video/audio connection for your medical care today? Yes     CHIEF COMPLAINT  No chief complaint on file.        HPI  Leona Murillo is a 27 y.o. female  presents with complaint of 1 day history of sore throat with painful swallowing, swollen lymph nodes in front of neck, nasal congestion with mild cough.  She reports that her kids have strep throat and she feels she has it as well.  She denies fever    Review of Systems   Constitutional: Negative for fever.   HENT: Positive for congestion and sore throat.    Respiratory: Positive for cough.    Hematological: Positive for adenopathy.   All other systems reviewed and are negative.      Past Medical History:   Diagnosis Date   • Gestational diabetes     w/ first 3 pregnancy   • Obesity    • PCOS (polycystic ovarian syndrome)        Family History   Problem Relation Age of Onset   • Diabetes Father    • Heart disease Father         chf   • Heart attack Father    • Diabetes Mother    • Hypertension Mother    • Heart disease Paternal Grandfather        Social History     Socioeconomic History   • Marital status:    Tobacco Use   • Smoking status: Former Smoker     Packs/day: 0.25     Years: 2.00     Pack years: 0.50     Quit date: 2021     Years since quittin.4   • Smokeless tobacco: Never Used   Vaping Use   • Vaping Use: Never used   Substance and Sexual Activity   • Alcohol use: Yes     Comment: Socially    • Drug use: No   • Sexual activity: Never       Leona Murillo  reports that she quit smoking about 16 months ago. She has a 0.50 pack-year smoking history. She has never used smokeless tobacco..              There were no vitals taken for this visit.    PHYSICAL EXAM  Physical Exam   Constitutional: She is oriented to person, place, and time. She appears well-developed and well-nourished. She does not have a sickly appearance. She does not appear ill.   HENT:    Head: Normocephalic and atraumatic.   Pulmonary/Chest: Effort normal.  No respiratory distress.  Lymphadenopathy:        Right cervical: Anterior cervical adenopathy present.        Left cervical: Anterior cervical adenopathy present.   Neurological: She is alert and oriented to person, place, and time.         Diagnoses and all orders for this visit:    1. Strep throat (Primary)  -     azithromycin (Zithromax Z-Macario) 250 MG tablet; Take 2 tablets by mouth on day 1, then 1 tablet daily on days 2-5  Dispense: 6 tablet; Refill: 0    --take Zpack as prescribed  --increase fluids, rest, tylenol or ibuprofen for pain  --f/u in 3-5 days if no improvement      FOLLOW-UP  As discussed during visit with PCP/[facility] if no improvement or Urgent Care/Emergency Department if worsening of symptoms    Patient verbalizes understanding of medication dosage, comfort measures, instructions for treatment and follow-up.    Maryjo Dai, ARAM  09/19/2022  08:25 EDT    The use of a video visit has been reviewed with the patient and verbal informed consent has been obtained. Myself and Leona Murillo participated in this visit. The patient is located in [address] [place] [address].    I am located in [place], [place]. Mychart and Zoom were utilized. I spent 20 minutes in the patient's chart for this visit.

## 2022-10-13 ENCOUNTER — OFFICE VISIT (OUTPATIENT)
Dept: FAMILY MEDICINE CLINIC | Facility: CLINIC | Age: 27
End: 2022-10-13

## 2022-10-13 VITALS
BODY MASS INDEX: 41.29 KG/M2 | OXYGEN SATURATION: 100 % | WEIGHT: 233 LBS | HEIGHT: 63 IN | DIASTOLIC BLOOD PRESSURE: 62 MMHG | HEART RATE: 80 BPM | TEMPERATURE: 98.2 F | SYSTOLIC BLOOD PRESSURE: 122 MMHG | RESPIRATION RATE: 16 BRPM

## 2022-10-13 DIAGNOSIS — R20.0 NUMBNESS AND TINGLING OF RIGHT ARM: ICD-10-CM

## 2022-10-13 DIAGNOSIS — R20.2 NUMBNESS AND TINGLING OF RIGHT ARM: ICD-10-CM

## 2022-10-13 DIAGNOSIS — R20.0 LEFT ARM NUMBNESS: Primary | ICD-10-CM

## 2022-10-13 PROCEDURE — 99213 OFFICE O/P EST LOW 20 MIN: CPT
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
## 2022-10-19 ENCOUNTER — TELEPHONE (OUTPATIENT)
Dept: FAMILY MEDICINE CLINIC | Facility: CLINIC | Age: 27
End: 2022-10-19

## 2022-10-19 NOTE — TELEPHONE ENCOUNTER
Caller: Leona Murillo    Relationship: Self    Best call back number: [phone]    What medication are you requesting: Z PACK    What are your current symptoms: ABSCESS     HIf a prescription is needed, what is your preferred pharmacy and phone number: [facility] [address] [phone]  - [phone]      Additional notes: PATIENT STATES THAT THE ABSCESS HAS CAME TO A HEAD AND IS REQUESTING A ANTIBIOTIC

## 2022-10-19 NOTE — TELEPHONE ENCOUNTER
Patient has appointment tomorrow which she needs to keep.  Typically with abscesses we want them to drain and says she is allergic to multiple medications I do not wish to start antibiotic to make matters worse.  Have her put warm soaks 5 4-5 times a day and keep her follow-up and we will address at that time.

## 2022-10-20 ENCOUNTER — OFFICE VISIT (OUTPATIENT)
Dept: FAMILY MEDICINE CLINIC | Facility: CLINIC | Age: 27
End: 2022-10-20

## 2022-10-20 VITALS
OXYGEN SATURATION: 99 % | BODY MASS INDEX: 41.29 KG/M2 | SYSTOLIC BLOOD PRESSURE: 120 MMHG | WEIGHT: 233 LBS | RESPIRATION RATE: 16 BRPM | HEIGHT: 63 IN | TEMPERATURE: 98 F | HEART RATE: 78 BPM | DIASTOLIC BLOOD PRESSURE: 80 MMHG

## 2022-10-20 DIAGNOSIS — R20.2 NUMBNESS AND TINGLING OF LEFT UPPER EXTREMITY: ICD-10-CM

## 2022-10-20 DIAGNOSIS — R20.2 NUMBNESS AND TINGLING OF RIGHT ARM: Primary | ICD-10-CM

## 2022-10-20 DIAGNOSIS — Z32.02 PREGNANCY TEST NEGATIVE: ICD-10-CM

## 2022-10-20 DIAGNOSIS — R20.0 NUMBNESS AND TINGLING OF RIGHT ARM: Primary | ICD-10-CM

## 2022-10-20 DIAGNOSIS — K13.70 MOUTH LESION: ICD-10-CM

## 2022-10-20 DIAGNOSIS — R20.0 NUMBNESS AND TINGLING OF LEFT UPPER EXTREMITY: ICD-10-CM

## 2022-10-20 LAB
B-HCG UR QL: NEGATIVE
EXPIRATION DATE: NORMAL
INTERNAL NEGATIVE CONTROL: NORMAL
INTERNAL POSITIVE CONTROL: NORMAL
Lab: NORMAL

## 2022-10-20 PROCEDURE — 99213 OFFICE O/P EST LOW 20 MIN: CPT

## 2022-10-20 PROCEDURE — 81025 URINE PREGNANCY TEST: CPT

## 2022-10-20 NOTE — PROGRESS NOTES
Office Note     Name: Leona Murillo    : 1995     MRN: [id number]     Chief Complaint  ER visit follow-up for hand numbness    Subjective     History of Present Illness:  Leona Murillo is a 27 y.o. female who presents today for ER follow up.  She reports that on Saturday morning, both of her hands felt very numb and cold.  She then went to the [facility] emergency room for her symptoms.  She denies any injury or trauma since her last visit that precipitated the ER visit.  She denies that her hands changed colors during this time or were pale.  She reports that while at the emergency room, they performed cervical spine x-rays and she was told by her ER provider that she had a cervical sprain and cervical muscle spasms.  She was treated with a steroid injection and was given Flexeril to go home with.  She reports that her symptoms of numbness and tingling have improved since then.  She reports that the numbness is still present intermittently, however it is occurring much less often and not to the same severity.  She reports that it is present more often in the left arm than the right.  She reports she is using the Flexeril very sparingly, as she is a mother of 3 and the Flexeril makes her very tired.  She denies neck pain, however she reports that she feels like turning over the left shoulder causes neck stiffness.  She reports she only uses it when she has her  at home.  Of note she does report that she has not received a phone call about the EMG/nerve conduction study that was ordered during her last visit.  She denies hand weakness or incoordination.  She denies any change in her chronic back pain (that she reports is due to epidurals).  She denies red flag symptoms for back pain, bowel or bladder incontinence or saddle anesthesia.  She denies leg symptoms.      Also of note, she reports that she had a lip abscess that developed over the past week.  She reports there was a small pocket of pus  on the inside of her lower lip.  She reports that she was seen at [facility] for this and was given clindamycin.  She reports she took the clindamycin for 2 to 3 days and then broke out in a rash on her upper thighs.  She has since stopped taking the clindamycin.  The rash is improving however is still there.  She reports that this past Saturday, 4 days ago, the pus pocket burst open.  She reports that since that time the swelling on her lip has gone down.  She reports that she has no swelling at this time on the lip.  She reports that she feels like this is fully resolved.    Also of note, she request that we do a urine pregnancy test in the office. Her last menstrual period was 2022    Review of Systems:   Review of Systems   Constitutional: Negative for chills, fatigue, fever and unexpected weight loss.   Respiratory: Negative for cough, chest tightness and shortness of breath.    Cardiovascular: Negative for chest pain and palpitations.   Gastrointestinal: Negative for constipation, diarrhea, nausea and vomiting.   Musculoskeletal: Positive for neck stiffness (When she rotates her head to the left). Negative for back pain and neck pain.        Mild numbness and tingling, in the L   Skin: Positive for rash (On thighs due to clindamycin). Negative for pallor.   Neurological: Positive for numbness (In bilateral upper extremities, worse on L). Negative for dizziness, syncope, weakness, light-headedness, headache and confusion.       Past Medical History:   Past Medical History:   Diagnosis Date   • Endometriosis    • Gestational diabetes     w/ first 3 pregnancy   • Obesity    • PCOS (polycystic ovarian syndrome)        Past Surgical History:   Past Surgical History:   Procedure Laterality Date   •  SECTION  2014   • CHOLECYSTECTOMY  01/15/2018   • TONSILLECTOMY         Immunizations:   There is no immunization history on file for this patient.     Medications:   No current outpatient  "medications on file.    Allergies:   Allergies   Allergen Reactions   • Amoxicillin Hives   • Cephalosporins Hives and Angioedema   • Latex Anaphylaxis and Hives   • Sulfa Antibiotics Hives   • Clindamycin/Lincomycin Confusion, Dizziness and Nausea Only       Family History:   Family History   Problem Relation Age of Onset   • Diabetes Father    • Heart disease Father         chf   • Heart attack Father    • Diabetes Mother    • Hypertension Mother    • Heart disease Paternal Grandfather        Social History:   Social History     Socioeconomic History   • Marital status:    Tobacco Use   • Smoking status: Some Days     Packs/day: 0.25     Years: 2.00     Pack years: 0.50     Types: Cigarettes     Last attempt to quit: 4/21/2019     Years since quitting: 3.5   • Smokeless tobacco: Never   Vaping Use   • Vaping Use: Never used   Substance and Sexual Activity   • Alcohol use: Never     Comment: Socially          Objective     Vital Signs  /80 (BP Location: Left arm, Patient Position: Sitting, Cuff Size: Adult)   Pulse 78   Temp 98 °F (36.7 °C)   Resp 16   Ht 160 cm (63\")   Wt 106 kg (233 lb)   SpO2 99%   BMI 41.27 kg/m²   Estimated body mass index is 41.27 kg/m² as calculated from the following:    Height as of this encounter: 160 cm (63\").    Weight as of this encounter: 106 kg (233 lb).          Physical Exam  Vitals and nursing note reviewed.   Constitutional:       General: She is not in acute distress.     Appearance: She is not ill-appearing or toxic-appearing.   HENT:      Head: Normocephalic and atraumatic.      Mouth/Throat:      Mouth: Mucous membranes are moist. No oral lesions.      Dentition: Normal dentition. No gingival swelling, dental abscesses or gum lesions.      Pharynx: No oropharyngeal exudate or posterior oropharyngeal erythema.      Comments: Inner aspect of lower lip is not swollen, there is no pus pocket or area of erythema visualized.  Eyes:      Extraocular Movements: " Extraocular movements intact.   Cardiovascular:      Rate and Rhythm: Normal rate and regular rhythm.      Heart sounds: No murmur heard.    No friction rub. No gallop.   Pulmonary:      Effort: Pulmonary effort is normal. No respiratory distress.      Breath sounds: No wheezing, rhonchi or rales.   Musculoskeletal:      Right shoulder: Normal range of motion. Normal strength.      Left shoulder: Normal range of motion. Normal strength.      Right hand: No swelling or deformity. Normal strength. Normal sensation. Normal capillary refill. Normal pulse.      Left hand: No swelling or deformity. Normal strength. Normal sensation. Normal capillary refill. Normal pulse.      Cervical back: Neck supple. Muscular tenderness (Slightly tender to palpation of R and L trapezius) present. No pain with movement or spinous process tenderness. Normal range of motion (Decreased left cervical rotation due to neck stiffness, full ROM in R rotation, flexion, and extension).      Thoracic back: No bony tenderness. Normal range of motion.      Lumbar back: Normal range of motion.   Skin:     Capillary Refill: Capillary refill takes less than 2 seconds.      Coloration: Skin is not mottled or pale.      Comments: Both hands warm to the touch   Neurological:      General: No focal deficit present.      Mental Status: She is alert and oriented to person, place, and time.      Deep Tendon Reflexes:      Reflex Scores:       Tricep reflexes are 2+ on the right side and 2+ on the left side.       Bicep reflexes are 2+ on the right side and 2+ on the left side.  Psychiatric:         Mood and Affect: Mood normal.         Thought Content: Thought content normal.          Assessment and Plan     1. Numbness and tingling of left upper extremity  -We were able to obtain and reviewed the [facility] emergency room note.  This result is scanned into her record.  Her x-ray findings were unremarkable.  Findings included well-preserved  intervertebral disc space, no evidence of acute fracture or subluxation, no evidence of inflammatory neoplastic or significant arthritic change.  Emergency room provider treated her with Flexeril and a Kenalog shot for cervical sprain and muscle spasm.  -She does report that the numbness and tingling in her upper extremities has improved since her trip to the emergency department.  However it is still there, but more mild.  -A nerve conduction/EMG study was ordered for referral during her last appointment, around 1 week ago.  This has not yet been scheduled however it is pending.  I encouraged her to follow-up with us next week if she still has not heard from the referral.  We will move forward with this testing.  -Referral for physical therapy has been placed to treat cervical neck stiffness and radiculopathy symptoms.  She is agreeable to trying physical therapy.  -She has Flexeril at home that she can take as needed.  I educated her that this medicine can make her very sleepy and to not take this medicine before she drives, if she is watching her children alone, or is operating heavy machinery.  -I encouraged her to call or return to care should her symptoms worsen.  -She demonstrated understanding and had no further questions  - Ambulatory Referral to Physical Therapy Evaluate and treat    2. Numbness and tingling of right arm  -We were able to obtain and reviewed the [facility] emergency room note.  This result is scanned into her record.  Her x-ray findings were unremarkable.  Findings included well-preserved intervertebral disc space, no evidence of acute fracture or subluxation, no evidence of inflammatory neoplastic or significant arthritic change.  Emergency room provider treated her with Flexeril and a Kenalog shot for cervical sprain and muscle spasm.  -She does report that the numbness and tingling in her upper extremities has improved since her trip to the emergency department.  However it is  still there, but more mild.  -A nerve conduction/EMG study was ordered for referral during her last appointment, around 1 week ago.  This has not yet been scheduled however it is pending.  I encouraged her to follow-up with us next week if she still has not heard from the referral.  We will move forward with this testing.  -Referral for physical therapy has been placed to treat cervical neck stiffness and radiculopathy symptoms.  She is agreeable to trying physical therapy.  -She has Flexeril at home that she can take as needed.  I educated her that this medicine can make her very sleepy and to not take this medicine before she drives, if she is watching her children alone, or is operating heavy machinery.  -I encouraged her to call or return to care should her symptoms worsen.  -She demonstrated understanding and had no further questions.  - Ambulatory Referral to Physical Therapy Evaluate and treat    3. Mouth lesion  -According to patient's history and physical exam, previous mouth lesion seems to have resolved.  -I encouraged her to continue with applying warm compresses to the area and salty gargles for symptomatic relief.  -I encouraged her to call or return to care if the symptoms recur.  -She demonstrated understanding and had no further questions.      4. Pregnancy test negative  -Her point-of-care pregnancy test was negative in the office today.  I shared these results with her and she had no further questions.  - POCT pregnancy, urine       Follow Up  Return in about 1 month (around 11/20/2022).    DENISE Boston [facility]  [address] [place] [address] [place] [address] [phone]

## 2022-10-21 DIAGNOSIS — K12.2 ABSCESS OF BUCCAL SPACE OF MOUTH: Primary | ICD-10-CM

## 2022-10-21 RX ORDER — MUPIROCIN CALCIUM 20 MG/G
1 CREAM TOPICAL 3 TIMES DAILY
Qty: 30 G | Refills: 0 | Status: SHIPPED | OUTPATIENT
Start: 2022-10-21 | End: 2022-10-24

## 2022-10-23 ENCOUNTER — TELEMEDICINE (OUTPATIENT)
Dept: FAMILY MEDICINE CLINIC | Facility: TELEHEALTH | Age: 27
End: 2022-10-23

## 2022-10-23 DIAGNOSIS — K04.7 DENTAL ABSCESS: Primary | ICD-10-CM

## 2022-10-23 DIAGNOSIS — L27.0 ALLERGIC DRUG RASH DUE TO ANTI-INFECTIVE AGENT: ICD-10-CM

## 2022-10-23 DIAGNOSIS — T37.95XA ALLERGIC DRUG RASH DUE TO ANTI-INFECTIVE AGENT: ICD-10-CM

## 2022-10-23 PROCEDURE — 99213 OFFICE O/P EST LOW 20 MIN: CPT | Performed by: NURSE PRACTITIONER

## 2022-10-23 RX ORDER — FLUCONAZOLE 150 MG/1
TABLET ORAL
COMMUNITY
Start: 2022-10-19 | End: 2022-11-16

## 2022-10-24 DIAGNOSIS — K12.2 ABSCESS OF BUCCAL SPACE OF MOUTH: Primary | ICD-10-CM

## 2022-10-24 RX ORDER — METHYLPREDNISOLONE 4 MG/1
TABLET ORAL
Qty: 21 TABLET | Refills: 0 | Status: SHIPPED | OUTPATIENT
Start: 2022-10-24 | End: 2022-10-27

## 2022-10-24 RX ORDER — AZITHROMYCIN 250 MG/1
TABLET, FILM COATED ORAL
Qty: 6 TABLET | Refills: 0 | Status: SHIPPED | OUTPATIENT
Start: 2022-10-24 | End: 2022-10-27

## 2022-10-27 ENCOUNTER — TELEPHONE (OUTPATIENT)
Dept: FAMILY MEDICINE CLINIC | Facility: CLINIC | Age: 27
End: 2022-10-27

## 2022-10-27 PROBLEM — N80.9 ENDOMETRIOSIS: Status: ACTIVE | Noted: 2022-06-13

## 2022-10-27 NOTE — TELEPHONE ENCOUNTER
Caller: Leona Murillo    Relationship: Self    Best call back number: [phone]    What medication are you requesting: PREVENTATIVE TAMIFLU    What are your current symptoms: NO SYMPTOMS YET DAUGHTER HAS FLU    How long have you been experiencing symptoms: N/A    Have you had these symptoms before:    [] Yes  [x] No    Have you been treated for these symptoms before:   [] Yes  [x] No    If a prescription is needed, what is your preferred pharmacy and phone number: [facility] [address] [phone] [facility] [phone]      Additional notes:  DAUGHTER TESTED POSITIVE FOR THE FLU 10/27/22 AND THE PATIENT IS REQUESTING PREVENTATIVE MEDICATION.

## 2022-10-27 NOTE — TELEPHONE ENCOUNTER
Patient really does not need to take Tamiflu as a prophylaxis unless she has a history of diabetes, heart disease, lung disease (i.e. asthma), renal disease or chronically immunosuppressed due to chemotherapy or medications to treat chronic inflammatory disease.  We just watch her symptoms and evaluate if she becomes symptomatic.

## 2022-11-07 ENCOUNTER — OFFICE VISIT (OUTPATIENT)
Dept: FAMILY MEDICINE CLINIC | Facility: CLINIC | Age: 27
End: 2022-11-07

## 2022-11-07 VITALS
TEMPERATURE: 98.1 F | DIASTOLIC BLOOD PRESSURE: 68 MMHG | HEIGHT: 63 IN | BODY MASS INDEX: 40.4 KG/M2 | OXYGEN SATURATION: 98 % | HEART RATE: 81 BPM | WEIGHT: 228 LBS | SYSTOLIC BLOOD PRESSURE: 106 MMHG

## 2022-11-07 DIAGNOSIS — R44.8 PARESTHESIA OF TONGUE: ICD-10-CM

## 2022-11-07 LAB
AMPHET+METHAMPHET UR QL: NEGATIVE
AMPHETAMINES UR QL: NEGATIVE
BARBITURATES UR QL SCN: NEGATIVE
BENZODIAZ UR QL SCN: NEGATIVE
BUPRENORPHINE SERPL-MCNC: NEGATIVE NG/ML
CANNABINOIDS SERPL QL: POSITIVE
COCAINE UR QL: NEGATIVE
METHADONE UR QL SCN: NEGATIVE
OPIATES UR QL: NEGATIVE
OXYCODONE UR QL SCN: NEGATIVE
PCP UR QL SCN: NEGATIVE
PROPOXYPH UR QL: NEGATIVE
TRICYCLICS UR QL SCN: NEGATIVE

## 2022-11-07 PROCEDURE — 99214 OFFICE O/P EST MOD 30 MIN: CPT

## 2022-11-07 PROCEDURE — 80306 DRUG TEST PRSMV INSTRMNT: CPT

## 2022-11-07 NOTE — PROGRESS NOTES
Office Note     Name: Leona Murillo    : 1995     MRN: [id number]     Chief Complaint  My tongue is feeling numb    Subjective     History of Present Illness:  Leona Murillo is a 27 y.o. female who presents today with symptoms of tongue numbness.  She reports that she first noticed tongue numbness this morning.  She reports that she feels like her entire tongue is numb, but more so on the tip.  She reports that the numbness is related to position.  She reports that standing makes the tongue numbness worse.  But sitting on a soft surface helps to alleviate the numbness and it will go away completely.  If she sits on a hard surface, the tingling will come back. Denies pain of the tongue.  She reports she is still able to taste.  She still has good movement of her tongue.  She denies any lesions in her mouth.  She denies any redness or discoloration of the tongue.  She denies any history of cold sores or herpes.  She denies that she has bit her tongue or any known trauma.  She denies recent dental procedure.  She uses [employer] toothpaste.  She does not use a toothpaste that has cinnamon in it.  She also reports some numbness to her upper cheek area.  She denies sinus pain, runny nose, congestion.  She denies any pain in her face.  She denies any headache.  She denies any changes of her vision.  She does report that she has had floaters in her vision for some time.  She was last seen by an optometrist in October. She denies any family history of multiple sclerosis.  She denies any recent sickness.  She still does report muscular pain in her neck, from recent neck sprain.  She reports that left and right neck rotation are the most painful.  She is taking ibuprofen and using a heating pad at home for her neck pain.  If she bends her neck forward.  She denies that any shooting pain occurs.  She reports it is more like a muscle soreness.  She also reports that she is still having bilateral arm numbness and  tingling.  She reports that this is unchanged, and is not worse since the tongue numbness that started.  Of note, her EMG is scheduled for this Wednesday (2 days from now).  She denies any alcohol, tobacco, or drug use.  She denies any incontinence.  She denies any weakness in her legs. She denies any weight loss.  Denies chest pain or shortness of air.  She denies night sweats.      Review of Systems:   Review of Systems   Constitutional: Negative for chills, fatigue, fever and unexpected weight loss.   HENT: Negative for dental problem, drooling, ear pain, facial swelling, mouth sores, sneezing, sore throat and trouble swallowing.    Eyes: Positive for visual disturbance (Floaters). Negative for photophobia, pain and redness.   Respiratory: Negative for cough, chest tightness, shortness of breath and wheezing.    Cardiovascular: Negative for chest pain and palpitations.   Gastrointestinal: Negative for abdominal pain, constipation, diarrhea, nausea and vomiting.   Musculoskeletal: Positive for myalgias (Neck soreness).   Neurological: Positive for numbness. Negative for dizziness, seizures, syncope, weakness, light-headedness and headache.       Past Medical History:   Past Medical History:   Diagnosis Date   • Endometriosis    • Gestational diabetes     w/ first 3 pregnancy   • Obesity    • PCOS (polycystic ovarian syndrome)        Past Surgical History:   Past Surgical History:   Procedure Laterality Date   •  SECTION  2014   • CHOLECYSTECTOMY  01/15/2018   • TONSILLECTOMY         Immunizations:   There is no immunization history on file for this patient.     Medications:     Current Outpatient Medications:   •  fluconazole (DIFLUCAN) 150 MG tablet, TAKE 1 TABLET BY MOUTH NOW, REPEAT IN 3 DAYS IF NEEDED, Disp: , Rfl:   •  mupirocin (BACTROBAN) 2 % ointment, Apply 1 application topically to the appropriate area as directed 3 (Three) Times a Day., Disp: 30 g, Rfl: 0    Allergies:   Allergies  "  Allergen Reactions   • Amoxicillin Hives   • Cephalosporins Hives and Angioedema   • Latex Anaphylaxis and Hives   • Sulfa Antibiotics Hives   • Clindamycin/Lincomycin Hives, Nausea Only, Dizziness and Confusion       Family History:   Family History   Problem Relation Age of Onset   • Diabetes Father    • Heart disease Father         chf   • Heart attack Father    • Diabetes Mother    • Hypertension Mother    • Heart disease Paternal Grandfather        Social History:   Social History     Socioeconomic History   • Marital status:    Tobacco Use   • Smoking status: Some Days     Packs/day: 0.25     Years: 2.00     Pack years: 0.50     Types: Cigarettes     Last attempt to quit: 4/21/2019     Years since quitting: 3.5   • Smokeless tobacco: Never   Vaping Use   • Vaping Use: Never used   Substance and Sexual Activity   • Alcohol use: Never     Comment: Socially    • Drug use: Never         Objective     Vital Signs  /68   Pulse 81   Temp 98.1 °F (36.7 °C)   Ht 160 cm (63\")   Wt 103 kg (228 lb)   SpO2 98%   BMI 40.39 kg/m²   Estimated body mass index is 40.39 kg/m² as calculated from the following:    Height as of this encounter: 160 cm (63\").    Weight as of this encounter: 103 kg (228 lb).          Physical Exam  Vitals and nursing note reviewed.   Constitutional:       General: She is not in acute distress.     Appearance: Normal appearance. She is not ill-appearing, toxic-appearing or diaphoretic.   HENT:      Head: Normocephalic and atraumatic.      Comments: Sensation is intact bilaterally on cheeks. Sensation of the three divisions of the trigeminal nerve intact bilaterally.     Right Ear: Tympanic membrane, ear canal and external ear normal.      Left Ear: Tympanic membrane, ear canal and external ear normal.      Mouth/Throat:      Lips: No lesions.      Mouth: Mucous membranes are moist. No oral lesions.      Tongue: No lesions. Tongue does not deviate from midline.      Palate: No " lesions.      Pharynx: Uvula midline. No pharyngeal swelling, oropharyngeal exudate or posterior oropharyngeal erythema.      Comments: Tongue has no lesions or discoloration on the dorsal or ventral aspect.  Eyes:      General:         Right eye: No discharge.         Left eye: No discharge.      Extraocular Movements: Extraocular movements intact.      Pupils: Pupils are equal, round, and reactive to light.   Cardiovascular:      Rate and Rhythm: Normal rate and regular rhythm.      Heart sounds: No murmur heard.    No friction rub. No gallop.   Pulmonary:      Effort: Pulmonary effort is normal. No respiratory distress.      Breath sounds: No wheezing, rhonchi or rales.   Musculoskeletal:      Comments: Hamstrings and quadriceps strength 5/5   Skin:     General: Skin is warm.      Coloration: Skin is not pale.   Neurological:      Mental Status: She is alert and oriented to person, place, and time.      Cranial Nerves: Cranial nerves 2-12 are intact. No cranial nerve deficit or facial asymmetry.      Gait: Gait normal.      Deep Tendon Reflexes:      Reflex Scores:       Bicep reflexes are 2+ on the right side and 2+ on the left side.       Patellar reflexes are 2+ on the right side and 2+ on the left side.  Psychiatric:         Mood and Affect: Mood normal.         Thought Content: Thought content normal.          Assessment and Plan     1. Paresthesia of tongue  -We will investigate further with lab work.  -Discussed this patient with my supervising physician, and he briefly saw her in office as well.  -We discussed that we could try to treat with antiviral or with steroids.  Due to significant drug allergies, the patient would like to defer any treatment at this time.  She reports her  is not home and would not like to start any medications at this time.  -Her tongue on physical exam is benign without any acute findings.  Her vitals are stable.  -Her neurologic exam is benign without acute findings.  -I  encouraged her to proceed with EMG testing in 2 days.  -We discussed that next best steps for her various symptoms may be a referral to neurology.  -I encouraged her to seek emergent care if she has syncope, seizure, worst headache of her life, new onset leg weakness or incoordination, loss of bladder or bowel function, or other new neurologic finding.  -She verbalized understanding and no further questions.  - CBC (No Diff); Future  - Comprehensive Metabolic Panel; Future  - C-reactive Protein; Future  - Hemoglobin A1c; Future  - Lipid Panel; Future  - TSH Rfx On Abnormal To Free T4; Future  - Sedimentation Rate; Future  - HLA-B27 Antigen; Future  - ELVA; Future  - Cyclic Citrul Peptide Antibody, IgG / IgA; Future  - Rheumatoid Factor; Future  - POCT urinalysis dipstick, automated  - Urine Drug Screen - Urine, Clean Catch; Future  - Urine Drug Screen - Urine, Clean Catch       Follow Up  Return for Next scheduled follow up.    DENISE Boston [facility]  [address] [place] [address] [place] [address] [phone]

## 2022-11-14 ENCOUNTER — TELEMEDICINE (OUTPATIENT)
Dept: FAMILY MEDICINE CLINIC | Facility: TELEHEALTH | Age: 27
End: 2022-11-14

## 2022-11-14 VITALS — TEMPERATURE: 100.5 F

## 2022-11-14 DIAGNOSIS — R68.89 SUSPECTED INVASIVE GROUP A BETA-HEMOLYTIC STREPTOCOCCAL DISEASE: Primary | ICD-10-CM

## 2022-11-14 DIAGNOSIS — R50.9 FEVER, UNSPECIFIED FEVER CAUSE: ICD-10-CM

## 2022-11-14 PROCEDURE — 99213 OFFICE O/P EST LOW 20 MIN: CPT | Performed by: NURSE PRACTITIONER

## 2022-11-14 RX ORDER — AZITHROMYCIN 250 MG/1
TABLET, FILM COATED ORAL
Qty: 6 TABLET | Refills: 0 | Status: SHIPPED | OUTPATIENT
Start: 2022-11-14 | End: 2023-02-13

## 2022-11-14 NOTE — PROGRESS NOTES
CHIEF COMPLAINT  Chief Complaint   Patient presents with   • Sore Throat     Strep exposure          HPI  Leona Murillo is a 27 y.o. female  presents with complaint of sore throat that has been present for 2 days. She reports 3 of her 4 kids have had strep throat. She has had a fever of 100.5 with no headache or n/v. She is taking IBU for pain which does relieve the pain.     Review of Systems   Constitutional: Positive for fatigue and fever.   HENT: Positive for sore throat and trouble swallowing.    Respiratory: Negative.    Cardiovascular: Negative.    Gastrointestinal: Negative.    Musculoskeletal: Negative.    Skin: Negative.    Neurological: Negative.    Hematological: Negative.         Cervical Lymph node tenderness bilaterally. No swelling   Psychiatric/Behavioral: Negative.        Past Medical History:   Diagnosis Date   • Endometriosis    • Gestational diabetes     w/ first 3 pregnancy   • Obesity    • PCOS (polycystic ovarian syndrome)        Family History   Problem Relation Age of Onset   • Diabetes Father    • Heart disease Father         chf   • Heart attack Father    • Diabetes Mother    • Hypertension Mother    • Heart disease Paternal Grandfather        Social History     Socioeconomic History   • Marital status:    Tobacco Use   • Smoking status: Some Days     Packs/day: 0.25     Years: 2.00     Pack years: 0.50     Types: Cigarettes     Last attempt to quit: 4/21/2019     Years since quitting: 3.5   • Smokeless tobacco: Never   Vaping Use   • Vaping Use: Never used   Substance and Sexual Activity   • Alcohol use: Never     Comment: Socially    • Drug use: Never         Temp 100.5 °F (38.1 °C)     PHYSICAL EXAM  Virtual Visit Physical Exam    Results for orders placed or performed in visit on 11/07/22   Urine Drug Screen - Urine, Clean Catch    Specimen: Urine, Clean Catch   Result Value Ref Range    THC, Screen, Urine Positive (A) Negative    Phencyclidine (PCP), Urine Negative Negative     Cocaine Screen, Urine Negative Negative    Methamphetamine, Ur Negative Negative    Opiate Screen Negative Negative    Amphetamine Screen, Urine Negative Negative    Benzodiazepine Screen, Urine Negative Negative    Tricyclic Antidepressants Screen Negative Negative    Methadone Screen, Urine Negative Negative    Barbiturates Screen, Urine Negative Negative    Oxycodone Screen, Urine Negative Negative    Propoxyphene Screen Negative Negative    Buprenorphine, Screen, Urine Negative Negative       Diagnoses and all orders for this visit:    1. Suspected invasive group A beta-hemolytic streptococcal disease (Primary)  -     azithromycin (Zithromax Z-Macario) 250 MG tablet; Take 2 tablets by mouth on day 1, then 1 tablet daily on days 2-5  Dispense: 6 tablet; Refill: 0    2. Fever, unspecified fever cause            Treatment:  · Gargle with a salt-water mixture 3-4 times a day or as needed. To make a salt-water mixture, completely dissolve ½-1 tsp (3-6 g) of salt in 1 cup (237 mL) of warm water.  · Get plenty of rest.  · Stay home from work or school until you have been taking antibiotics for 24 hours.  · Avoid smoking or being around people who smoke.  · Keep all follow-up visits as told by your health care provider. This is important.         Prevention:  · Do not share food, drinking cups, or personal items that could cause the infection to spread to other people.  · Wash your hands well with soap and water, and make sure that all people in your house wash their hands well.  · Have family members tested if they have a sore throat or fever. They may need an antibiotic if they have strep throat.    Follow-up with PCP if:   · The glands in your neck continue to get bigger.  · You develop a rash, cough, or earache.  · You cough up a thick mucus that is green, yellow-brown, or bloody.  · You have pain or discomfort that does not get better with medicine.  · Your symptoms seem to be getting worse and not better.  · You  have a fever.    Seek immediate care at Emergency Department:   · You have new symptoms, such as vomiting, severe headache, stiff or painful neck, chest pain, or shortness of breath.  · You have severe throat pain, drooling, or changes in your voice.  · You have swelling of the neck, or the skin on the neck becomes red and tender.  · You have signs of dehydration, such as tiredness (fatigue), dry mouth, and decreased urination.  · You become increasingly sleepy, or you cannot wake up completely.  · Your joints become red or painful.        The use of a video visit has been reviewed with the patient and verbal informd consent has een obtained. Myself and Leona Murillo participated in this visit. The patient is located in [address] [place] [address]. I am located in [place], [place]. Mychart and Zoom were utilized. I spent 15  minutes in the patient's chart for this visit.           Edita Sena, ARAM  11/14/2022  11:58 EST

## 2022-11-16 ENCOUNTER — PROCEDURE VISIT (OUTPATIENT)
Dept: ORTHOPEDIC SURGERY | Facility: CLINIC | Age: 27
End: 2022-11-16

## 2022-11-16 DIAGNOSIS — M54.12 BRACHIAL NEURITIS: ICD-10-CM

## 2022-11-16 DIAGNOSIS — R20.2 PARESTHESIA: ICD-10-CM

## 2022-11-16 PROCEDURE — 95910 NRV CNDJ TEST 7-8 STUDIES: CPT | Performed by: PHYSICAL THERAPIST

## 2022-11-21 DIAGNOSIS — R20.0 NUMBNESS AND TINGLING OF LEFT UPPER EXTREMITY: Primary | ICD-10-CM

## 2022-11-21 DIAGNOSIS — R20.2 NUMBNESS AND TINGLING OF LEFT UPPER EXTREMITY: Primary | ICD-10-CM

## 2022-11-22 ENCOUNTER — CLINICAL SUPPORT (OUTPATIENT)
Dept: FAMILY MEDICINE CLINIC | Facility: CLINIC | Age: 27
End: 2022-11-22

## 2022-11-22 DIAGNOSIS — R44.8 PARESTHESIA OF TONGUE: ICD-10-CM

## 2022-11-22 PROCEDURE — 86038 ANTINUCLEAR ANTIBODIES: CPT

## 2022-11-22 PROCEDURE — 86200 CCP ANTIBODY: CPT

## 2022-11-25 LAB — ANA SER QL: NEGATIVE

## 2022-11-27 LAB — CCP IGA+IGG SERPL IA-ACNC: 4 UNITS (ref 0–19)

## 2022-11-28 ENCOUNTER — TELEPHONE (OUTPATIENT)
Dept: FAMILY MEDICINE CLINIC | Facility: CLINIC | Age: 27
End: 2022-11-28

## 2022-11-28 NOTE — TELEPHONE ENCOUNTER
I called the patient and shared her lab results and my notes listed below. She verbalized understanding and had no further questions.    ----- Message from Kely Salguero PA-C sent at 11/28/2022  8:26 AM EST -----  Her ELVA is negative, which is a nonspecific test for multiple rheumatologic conditions.  Her anti-CCP antibodies is also negative.  This is a marker of rheumatoid arthritis.  We will await the remainder of her blood work results that she is planning to get at [facility].

## 2023-02-13 ENCOUNTER — LAB (OUTPATIENT)
Dept: LAB | Facility: HOSPITAL | Age: 28
End: 2023-02-13
Payer: COMMERCIAL

## 2023-02-13 ENCOUNTER — OFFICE VISIT (OUTPATIENT)
Dept: NEUROLOGY | Facility: CLINIC | Age: 28
End: 2023-02-13
Payer: COMMERCIAL

## 2023-02-13 ENCOUNTER — HOSPITAL ENCOUNTER (OUTPATIENT)
Dept: GENERAL RADIOLOGY | Facility: HOSPITAL | Age: 28
Discharge: HOME | End: 2023-02-13
Payer: COMMERCIAL

## 2023-02-13 VITALS
WEIGHT: 239.4 LBS | OXYGEN SATURATION: 98 % | HEART RATE: 80 BPM | SYSTOLIC BLOOD PRESSURE: 124 MMHG | HEIGHT: 63 IN | BODY MASS INDEX: 42.42 KG/M2 | DIASTOLIC BLOOD PRESSURE: 78 MMHG | TEMPERATURE: 97.3 F

## 2023-02-13 DIAGNOSIS — R20.2 NUMBNESS AND TINGLING SENSATION OF SKIN: ICD-10-CM

## 2023-02-13 DIAGNOSIS — R20.0 NUMBNESS AND TINGLING SENSATION OF SKIN: ICD-10-CM

## 2023-02-13 DIAGNOSIS — G47.19 EXCESSIVE DAYTIME SLEEPINESS: ICD-10-CM

## 2023-02-13 DIAGNOSIS — G43.719 INTRACTABLE CHRONIC MIGRAINE WITHOUT AURA AND WITHOUT STATUS MIGRAINOSUS: ICD-10-CM

## 2023-02-13 DIAGNOSIS — R20.0 NUMBNESS AND TINGLING SENSATION OF SKIN: Primary | ICD-10-CM

## 2023-02-13 DIAGNOSIS — Z87.820 HISTORY OF CLOSED HEAD INJURY: ICD-10-CM

## 2023-02-13 DIAGNOSIS — G47.9 RESTLESS SLEEPER: ICD-10-CM

## 2023-02-13 DIAGNOSIS — R20.2 NUMBNESS AND TINGLING SENSATION OF SKIN: Primary | ICD-10-CM

## 2023-02-13 LAB
FOLATE SERPL-MCNC: >20 NG/ML (ref 4.78–24.2)
VIT B12 BLD-MCNC: 341 PG/ML (ref 211–946)

## 2023-02-13 PROCEDURE — 82607 VITAMIN B-12: CPT

## 2023-02-13 PROCEDURE — 83921 ORGANIC ACID SINGLE QUANT: CPT

## 2023-02-13 PROCEDURE — 72052 X-RAY EXAM NECK SPINE 6/>VWS: CPT

## 2023-02-13 PROCEDURE — 36415 COLL VENOUS BLD VENIPUNCTURE: CPT

## 2023-02-13 PROCEDURE — 99215 OFFICE O/P EST HI 40 MIN: CPT | Performed by: NURSE PRACTITIONER

## 2023-02-13 PROCEDURE — 82746 ASSAY OF FOLIC ACID SERUM: CPT

## 2023-02-13 RX ORDER — DOXYCYCLINE 100 MG/1
1 TABLET ORAL EVERY 12 HOURS SCHEDULED
COMMUNITY
Start: 2022-11-18 | End: 2023-02-13

## 2023-02-13 RX ORDER — PROPRANOLOL HYDROCHLORIDE 20 MG/1
10 TABLET ORAL 2 TIMES DAILY
Qty: 15 TABLET | Refills: 3 | Status: SHIPPED | OUTPATIENT
Start: 2023-02-13

## 2023-02-13 RX ORDER — RIZATRIPTAN BENZOATE 10 MG/1
10 TABLET ORAL ONCE AS NEEDED
Qty: 9 TABLET | Refills: 3 | Status: SHIPPED | OUTPATIENT
Start: 2023-02-13
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
## 2023-02-21 ENCOUNTER — PATIENT ROUNDING (BHMG ONLY) (OUTPATIENT)
Dept: NEUROLOGY | Facility: CLINIC | Age: 28
End: 2023-02-21
Payer: COMMERCIAL

## 2023-02-23 LAB — METHYLMALONATE SERPL-SCNC: 201 NMOL/L (ref 0–378)

## 2023-03-13 DIAGNOSIS — E66.01 MORBID (SEVERE) OBESITY DUE TO EXCESS CALORIES: Primary | ICD-10-CM

## 2023-03-20 ENCOUNTER — HOSPITAL ENCOUNTER (OUTPATIENT)
Dept: MRI IMAGING | Facility: HOSPITAL | Age: 28
End: 2023-03-20
Payer: COMMERCIAL

## 2023-03-20 ENCOUNTER — HOSPITAL ENCOUNTER (OUTPATIENT)
Dept: SLEEP MEDICINE | Facility: HOSPITAL | Age: 28
Discharge: HOME | End: 2023-03-20
Admitting: NURSE PRACTITIONER
Payer: COMMERCIAL

## 2023-03-20 DIAGNOSIS — G47.19 EXCESSIVE DAYTIME SLEEPINESS: ICD-10-CM

## 2023-03-20 DIAGNOSIS — G47.9 RESTLESS SLEEPER: ICD-10-CM

## 2023-03-20 PROCEDURE — 95806 SLEEP STUDY UNATT&RESP EFFT: CPT

## 2023-03-23 PROCEDURE — 95806 SLEEP STUDY UNATT&RESP EFFT: CPT | Performed by: INTERNAL MEDICINE

## 2023-04-18 ENCOUNTER — OFFICE VISIT (OUTPATIENT)
Dept: FAMILY MEDICINE CLINIC | Facility: CLINIC | Age: 28
End: 2023-04-18
Payer: COMMERCIAL

## 2023-04-18 DIAGNOSIS — L81.9 DISCOLORATION OF SKIN: Primary | ICD-10-CM

## 2023-04-18 DIAGNOSIS — G89.29 CHRONIC LEFT-SIDED LOW BACK PAIN WITH BILATERAL SCIATICA: ICD-10-CM

## 2023-04-18 DIAGNOSIS — M54.42 CHRONIC LEFT-SIDED LOW BACK PAIN WITH BILATERAL SCIATICA: ICD-10-CM

## 2023-04-18 DIAGNOSIS — M54.41 CHRONIC LEFT-SIDED LOW BACK PAIN WITH BILATERAL SCIATICA: ICD-10-CM

## 2023-04-18 PROCEDURE — 1159F MED LIST DOCD IN RCRD: CPT

## 2023-04-18 PROCEDURE — 99214 OFFICE O/P EST MOD 30 MIN: CPT

## 2023-04-18 PROCEDURE — 1160F RVW MEDS BY RX/DR IN RCRD: CPT

## 2023-04-18 RX ORDER — NAPROXEN 250 MG/1
250 TABLET ORAL 2 TIMES DAILY PRN
Qty: 30 TABLET | Refills: 0 | Status: SHIPPED | OUTPATIENT
Start: 2023-04-18

## 2023-04-18 NOTE — PROGRESS NOTES
Office Note     Name: Leona Murillo    : 1995     MRN: [id number]     Chief Complaint  Skin discoloration, back pain    History of Present Illness:  Leona Murillo is a 27 y.o. female who presents today for concerns of skin discoloration and back pain.  She reports that she used a handheld shiatsu massager on her lower back 1 month ago.  She reports that she had bruising to the left lower back after using the massager.  She reports that since that time, she is still having some skin discoloration to the left lower back and what she reports as skin dimpling.  She denies any other injury or trauma that caused the bruising.  She has not had any cuts, lesions, etc.  Denies any bug bites, or any break in skin.  She denies fever or chills.  She denies that the area has ever been erythematous or had any swelling.  She denies any discharge from the area.  She reports that she used a massager over her entire low back and only this area had bruising.  She does report that the area was sore originally, but is not sore now.  She denies that she has used a massager since this episode.  She would like to see a dermatologist about the skin concern.    She does report chronic left-sided low back pain.  She reports that this has been present for years.  She reports chronic sciatica in both legs.  She reports that she has had back pain and sciatica since the birth of her children.  She reports that she has had 4 epidurals. She denies any worsening or new changes in the sciatica symptoms.  She denies any weakness or numbness/tingling in either lower extremity.  She does report that several months ago, she had an episode of her thighs going numb when she was constipated, but reports that this numbness resolved after having a bowel movement.  She denies any numbness or tingling in the groin at present time.  She denies any bladder or bowel incontinence.  She denies any IV drug use.  Denies fever or chills.  She reports that  laying makes the back pain worse.  She reports that if she shifts her weight to the left side, this worsens her pain.  She reports that if she lays with her hips and knees in a flexed position, this helps alleviate her pain.  She does report that she has been sitting more since she started online school.  She denies any history of muscle spasm or weakness.  She reports using ibuprofen at home for her symptoms.  She denies any history of back trauma such as car accident.      Subjective     Review of Systems:   Review of Systems   Constitutional: Negative for diaphoresis, fever and unexpected weight loss.   Respiratory: Negative for shortness of breath.    Cardiovascular: Negative for chest pain.   Gastrointestinal: Negative for abdominal pain.   Genitourinary: Negative for dysuria, pelvic pain, urinary incontinence and vaginal pain.   Musculoskeletal: Positive for back pain.   Neurological: Negative for syncope, weakness and numbness.       I have reviewed the patients family history, social history, past medical history, past surgical history and have updated it as appropriate.     Past Medical History:   Past Medical History:   Diagnosis Date   • Endometriosis    • Gestational diabetes     w/ first 3 pregnancy   • Obesity    • PCOS (polycystic ovarian syndrome)        Past Surgical History:   Past Surgical History:   Procedure Laterality Date   •  SECTION  2014   • CHOLECYSTECTOMY  01/15/2018   • TONSILLECTOMY         Family History:   Family History   Problem Relation Age of Onset   • Diabetes Mother    • Hypertension Mother    • Migraines Mother    • Neuropathy Mother    • Arthritis Mother    • Diabetes Father    • Heart disease Father         chf   • Heart attack Father    • Heart disease Paternal Grandfather        Social History:   Social History     Socioeconomic History   • Marital status:    Tobacco Use   • Smoking status: Former     Packs/day: 0.25     Years: 2.00     Pack years:  "0.50     Types: Cigarettes     Quit date: 4/21/2019     Years since quitting: 3.9   • Smokeless tobacco: Never   Vaping Use   • Vaping Use: Never used   Substance and Sexual Activity   • Alcohol use: Not Currently     Comment: Socially    • Drug use: Yes     Types: Marijuana     Comment: reactional   • Sexual activity: Yes     Partners: Male     Birth control/protection: None       Immunizations:   There is no immunization history on file for this patient.     Medications:     Current Outpatient Medications:   •  naproxen (NAPROSYN) 250 MG tablet, Take 1 tablet by mouth 2 (Two) Times a Day As Needed (Back pain)., Disp: 30 tablet, Rfl: 0    Allergies:   Allergies   Allergen Reactions   • Amoxicillin Hives   • Cephalosporins Hives and Angioedema   • Latex Anaphylaxis and Hives   • Sulfa Antibiotics Hives   • Clindamycin/Lincomycin Hives, Nausea Only, Dizziness and Confusion       Objective     Vital Signs  Vitals:    04/18/23 1427   BP: 118/64   BP Location: Left arm   Patient Position: Sitting   Cuff Size: Adult   Pulse: 68   Resp: 16   Temp: 98.4 °F (36.9 °C)   TempSrc: Temporal   SpO2: 98%   Weight: 111 kg (244 lb 11.2 oz)   Height: 160 cm (62.99\")   PainSc:   6     Estimated body mass index is 43.36 kg/m² as calculated from the following:    Height as of this encounter: 160 cm (62.99\").    Weight as of this encounter: 111 kg (244 lb 11.2 oz).          Physical Exam  Vitals and nursing note reviewed.   Constitutional:       Appearance: Normal appearance.   HENT:      Head: Normocephalic and atraumatic.   Cardiovascular:      Rate and Rhythm: Normal rate and regular rhythm.      Heart sounds: No murmur heard.    No friction rub. No gallop.   Pulmonary:      Effort: Pulmonary effort is normal. No respiratory distress.      Breath sounds: No wheezing, rhonchi or rales.   Musculoskeletal:      Cervical back: No bony tenderness.      Thoracic back: No bony tenderness.      Lumbar back: Tenderness present. No spasms or " bony tenderness. Normal range of motion. Negative right straight leg raise test and negative left straight leg raise test.      Right knee: Normal range of motion.      Left knee: Normal range of motion.      Right lower leg: No swelling. No edema.      Left lower leg: No swelling. No edema.      Right ankle: Normal pulse.      Left ankle: Normal pulse.      Right foot: Normal capillary refill. No swelling. Normal pulse.      Left foot: Normal capillary refill. No swelling. Normal pulse.        Legs:       Comments: Hamstring and quadricep strength 5 out of 5 bilaterally  Plantarflexion dorsiflexion strength 5 out of 5 bilaterally   Skin:         Neurological:      Mental Status: She is alert.      Coordination: Coordination normal.      Gait: Gait normal.      Deep Tendon Reflexes:      Reflex Scores:       Patellar reflexes are 2+ on the right side and 2+ on the left side.         Assessment and Plan     1. Chronic left-sided low back pain with bilateral sciatica  -Patient does not display red flag symptoms of back pain at present time.  -I have encouraged supportive treatment for now: Heating pads, naproxen, topical analgesics such as Salonpas patches or lidocaine patches, activity modification as needed.  -We did discuss that naproxen can cause stomach ulcers.  If she has any abdominal pain, she needs to return to care.  Also discussed not taking naproxen and ibuprofen together.  She should also not take excessive amounts of naproxen.  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  -We did discuss red flag symptoms for back pain.  If she has any worsening of her current symptoms or new symptoms, I encouraged her to return to care sooner than follow-up appointment.  -She verbalizes understanding and has no  further questions.  - naproxen (NAPROSYN) 250 MG tablet; Take 1 tablet by mouth 2 (Two) Times a Day As Needed (Back pain).  Dispense: 30 tablet; Refill: 0  - Ambulatory Referral to Spine Surgery    2. Discoloration of skin  -I do not see any concerning findings on examination, no erythema, swelling, abscess, break in the skin, etc.  Appearance almost appears as a faded bruise.  -Patient has requested referral to dermatology for further evaluation.  -Advised abstaining from massager or further trauma to the area.  -Please return to care if symptoms worsen or new symptoms develop.  - Ambulatory Referral to Dermatology       Follow Up  Return in about 2 months (around 6/18/2023).    DENISE Boston

## 2023-04-19 VITALS
OXYGEN SATURATION: 98 % | WEIGHT: 244.7 LBS | TEMPERATURE: 98.4 F | BODY MASS INDEX: 43.36 KG/M2 | HEIGHT: 63 IN | HEART RATE: 68 BPM | SYSTOLIC BLOOD PRESSURE: 118 MMHG | DIASTOLIC BLOOD PRESSURE: 64 MMHG | RESPIRATION RATE: 16 BRPM

## 2023-04-28 ENCOUNTER — HOSPITAL ENCOUNTER (OUTPATIENT)
Dept: MRI IMAGING | Facility: HOSPITAL | Age: 28
Discharge: HOME | End: 2023-04-28
Payer: COMMERCIAL

## 2023-04-28 DIAGNOSIS — G43.719 INTRACTABLE CHRONIC MIGRAINE WITHOUT AURA AND WITHOUT STATUS MIGRAINOSUS: ICD-10-CM

## 2023-04-28 DIAGNOSIS — R20.0 NUMBNESS AND TINGLING SENSATION OF SKIN: ICD-10-CM

## 2023-04-28 DIAGNOSIS — Z87.820 HISTORY OF CLOSED HEAD INJURY: ICD-10-CM

## 2023-04-28 DIAGNOSIS — R20.2 NUMBNESS AND TINGLING SENSATION OF SKIN: ICD-10-CM

## 2023-04-28 PROCEDURE — 0 GADOBENATE DIMEGLUMINE 529 MG/ML SOLUTION: Performed by: NURSE PRACTITIONER

## 2023-04-28 PROCEDURE — 70553 MRI BRAIN STEM W/O & W/DYE: CPT

## 2023-04-28 PROCEDURE — A9577 INJ MULTIHANCE: HCPCS | Performed by: NURSE PRACTITIONER

## 2023-04-28 RX ADMIN — GADOBENATE DIMEGLUMINE 22 ML: 529 INJECTION, SOLUTION INTRAVENOUS at 14:36

## 2023-05-26 ENCOUNTER — TELEMEDICINE (OUTPATIENT)
Dept: FAMILY MEDICINE CLINIC | Facility: TELEHEALTH | Age: 28
End: 2023-05-26
Payer: COMMERCIAL

## 2023-05-26 DIAGNOSIS — H92.01 EARACHE ON RIGHT: Primary | ICD-10-CM

## 2023-05-26 PROCEDURE — 1160F RVW MEDS BY RX/DR IN RCRD: CPT | Performed by: NURSE PRACTITIONER

## 2023-05-26 PROCEDURE — 1159F MED LIST DOCD IN RCRD: CPT | Performed by: NURSE PRACTITIONER

## 2023-05-26 PROCEDURE — 99213 OFFICE O/P EST LOW 20 MIN: CPT | Performed by: NURSE PRACTITIONER

## 2023-05-26 RX ORDER — METHOCARBAMOL 750 MG/1
750 TABLET, FILM COATED ORAL 3 TIMES DAILY
COMMUNITY
Start: 2023-05-26

## 2023-05-26 RX ORDER — AZITHROMYCIN 250 MG/1
TABLET, FILM COATED ORAL
Qty: 6 TABLET | Refills: 0 | Status: SHIPPED | OUTPATIENT
Start: 2023-05-26

## 2023-05-26 RX ORDER — KETOROLAC TROMETHAMINE 10 MG/1
10 TABLET, FILM COATED ORAL EVERY 6 HOURS PRN
COMMUNITY
Start: 2023-05-26

## 2023-05-26 NOTE — PATIENT INSTRUCTIONS
Drink plenty of water  Over the counter pain relievers okay   If symptoms do not improve in 3-5 days follow up with your primary care provider or urgent care  Can consider steroids if antibiotic is not helpful after 3-4 days        Earache, Adult  An earache, or ear pain, can be caused by many things, including:  An infection.  Ear wax buildup.  Ear pressure.  Something in the ear that should not be there (foreign body).  A sore throat.  Tooth problems.  Jaw problems.  Treatment of the earache will depend on the cause. If the cause is not clear or cannot be determined, you may need to watch your symptoms until your earache goes away or until a cause is found.  Follow these instructions at home:  Medicines  Take or apply over-the-counter and prescription medicines only as told by your health care provider.  If you were prescribed an antibiotic medicine, use it as told by your health care provider. Do not stop using the antibiotic even if you start to feel better.  Do not put anything in your ear other than medicine that is prescribed by your health care provider.  Managing pain  If directed, apply heat to the affected area as often as told by your health care provider. Use the heat source that your health care provider recommends, such as a moist heat pack or a heating pad.  Place a towel between your skin and the heat source.  Leave the heat on for 20-30 minutes.  Remove the heat if your skin turns bright red. This is especially important if you are unable to feel pain, heat, or cold. You may have a greater risk of getting burned.  If directed, put ice on the affected area as often as told by your health care provider. To do this:       Put ice in a plastic bag.  Place a towel between your skin and the bag.  Leave the ice on for 20 minutes, 2-3 times a day.  General instructions  Pay attention to any changes in your symptoms.  Try resting in an upright position instead of lying down. This may help to reduce pressure  in your ear and relieve pain.  Chew gum if it helps to relieve your ear pain.  Treat any allergies as told by your health care provider.  Drink enough fluid to keep your urine pale yellow.  It is up to you to get the results of any tests that were done. Ask your health care provider, or the department that is doing the tests, when your results will be ready.  Keep all follow-up visits as told by your health care provider. This is important.  Contact a health care provider if:  Your pain does not improve within 2 days.  Your earache gets worse.  You have new symptoms.  You have a fever.  Get help right away if you:  Have a severe headache.  Have a stiff neck.  Have trouble swallowing.  Have redness or swelling behind your ear.  Have fluid or blood coming from your ear.  Have hearing loss.  Feel dizzy.  Summary  An earache, or ear pain, can be caused by many things.  Treatment of the earache will depend on the cause. Follow recommendations from your health care provider to treat your ear pain.  If the cause is not clear or cannot be determined, you may need to watch your symptoms until your earache goes away or until a cause is found.  Keep all follow-up visits as told by your health care provider. This is important.  This information is not intended to replace advice given to you by your health care provider. Make sure you discuss any questions you have with your health care provider.  Document Revised: 07/25/2020 Document Reviewed: 07/25/2020  [employer] Patient Education © 2022 [employer]

## 2023-05-26 NOTE — PROGRESS NOTES
CHIEF COMPLAINT  Chief Complaint   Patient presents with    Earache         HPI  Leona Murillo is a 27 y.o. female  presents with complaint of symptoms of right earache. She has some pain in her face as well. These are her typical earache.She has a history and has scaring in her left ear due to infections. She usually takes azithromycin first and if this does not help, she will try a steroid pack.     Review of Systems   Constitutional:  Negative for chills, diaphoresis, fatigue and fever.   HENT:  Positive for ear pain. Negative for congestion, postnasal drip, rhinorrhea and sinus pressure.    Respiratory:  Negative for cough.    Gastrointestinal:  Negative for diarrhea, nausea and vomiting.   Musculoskeletal:  Negative for myalgias.   Neurological:  Negative for headaches.     Past Medical History:   Diagnosis Date    Endometriosis     Gestational diabetes     w/ first 3 pregnancy    Obesity     PCOS (polycystic ovarian syndrome)        Family History   Problem Relation Age of Onset    Diabetes Mother     Hypertension Mother     Migraines Mother     Neuropathy Mother     Arthritis Mother     Diabetes Father     Heart disease Father         chf    Heart attack Father     Heart disease Paternal Grandfather        Social History     Socioeconomic History    Marital status:    Tobacco Use    Smoking status: Former     Packs/day: 0.25     Years: 2.00     Pack years: 0.50     Types: Cigarettes     Quit date: 2019     Years since quittin.0     Passive exposure: Never    Smokeless tobacco: Never   Vaping Use    Vaping Use: Never used   Substance and Sexual Activity    Alcohol use: Not Currently     Comment: Socially     Drug use: Yes     Types: Marijuana     Comment: reactional    Sexual activity: Yes     Partners: Male     Birth control/protection: None       Leona Murillo  reports that she quit smoking about 4 years ago. Her smoking use included cigarettes. She has a 0.50 pack-year smoking history. She  has never been exposed to tobacco smoke. She has never used smokeless tobacco..    LMP 05/20/2023 (Exact Date)   Breastfeeding No     PHYSICAL EXAM  Physical Exam   Constitutional: She is oriented to person, place, and time. She appears well-developed and well-nourished. She does not have a sickly appearance. She does not appear ill. No distress.   HENT:   Head: Normocephalic and atraumatic.   Eyes: EOM are normal.   Neck: Neck normal appearance.  Pulmonary/Chest: Effort normal.  No respiratory distress.  Neurological: She is alert and oriented to person, place, and time.   Skin: Skin is dry.   Psychiatric: She has a normal mood and affect.         Diagnoses and all orders for this visit:    1. Earache on right (Primary)    Other orders  -     azithromycin (Zithromax Z-Macario) 250 MG tablet; Take 2 tablets by mouth on day 1, then 1 tablet daily on days 2-5  Dispense: 6 tablet; Refill: 0        The use of a video visit has been reviewed with the patient and verbal informed consent has been obtained. Myself and Leona Murillo participated in this visit. The patient is located in [address] [place] [address]. I am located in [place], [place]. Mychart and Twilio were utilized.       Note Disclaimer: At [facility], we believe that sharing information builds trust and better   relationships. You are receiving this note because you recently visited [facility]. It is possible you   will see health information before a provider has talked with you about it. This kind of information can   be easy to misunderstand. To help you fully understand what it means for your health, we urge you to   discuss this note with your provider.    Precious Geller, ARAM  05/26/2023  10:59 EDT

## 2023-05-30 ENCOUNTER — OFFICE VISIT (OUTPATIENT)
Dept: FAMILY MEDICINE CLINIC | Facility: CLINIC | Age: 28
End: 2023-05-30

## 2023-05-30 VITALS
RESPIRATION RATE: 18 BRPM | HEIGHT: 63 IN | HEART RATE: 88 BPM | SYSTOLIC BLOOD PRESSURE: 118 MMHG | OXYGEN SATURATION: 99 % | BODY MASS INDEX: 43.05 KG/M2 | TEMPERATURE: 97 F | WEIGHT: 243 LBS | DIASTOLIC BLOOD PRESSURE: 82 MMHG

## 2023-05-30 DIAGNOSIS — R25.1 TREMOR: ICD-10-CM

## 2023-05-30 DIAGNOSIS — M54.2 CERVICALGIA: Primary | ICD-10-CM

## 2023-05-30 DIAGNOSIS — H69.83 DYSFUNCTION OF BOTH EUSTACHIAN TUBES: ICD-10-CM

## 2023-05-30 PROCEDURE — 1160F RVW MEDS BY RX/DR IN RCRD: CPT

## 2023-05-30 PROCEDURE — 1159F MED LIST DOCD IN RCRD: CPT

## 2023-05-30 PROCEDURE — 99214 OFFICE O/P EST MOD 30 MIN: CPT

## 2023-05-30 RX ORDER — CYCLOBENZAPRINE HCL 5 MG
5 TABLET ORAL 3 TIMES DAILY PRN
Qty: 20 TABLET | Refills: 0 | Status: SHIPPED | OUTPATIENT
Start: 2023-05-30

## 2023-05-30 RX ORDER — NAPROXEN 250 MG/1
250 TABLET ORAL 2 TIMES DAILY PRN
Qty: 30 TABLET | Refills: 0 | Status: SHIPPED | OUTPATIENT
Start: 2023-05-30

## 2023-05-30 RX ORDER — FLUTICASONE PROPIONATE 50 MCG
2 SPRAY, SUSPENSION (ML) NASAL DAILY
Qty: 9.9 ML | Refills: 0 | Status: SHIPPED | OUTPATIENT
Start: 2023-05-30

## 2023-05-30 NOTE — PROGRESS NOTES
Office Note     Name: Leona Murillo    : 1995     MRN: [id number]     Chief Complaint  Neck pain    History of Present Illness:  Leona Murillo is a 27 y.o. female who presents today for neck pain.  She reports that 1 week ago on Monday she was moving a couch and shortly afterwards, she began experiencing posterior neck pain and stiffness.  She reports that she was seen at the emergency department at [facility] the following Thursday and was diagnosed with a muscle strain in her neck.  She reports that she was given a Toradol shot in the emergency department, was prescribed outpatient Toradol and Robaxin.  She reports that none of this has seemed to help her symptoms.  She reports that she has neck stiffness and muscle spasms.  She reports tenderness to palpation of the posterior aspect of her neck.  She denies any weakness of either upper extremity.  She does have history of intermittent numbness and tingling in the upper extremities, but denies that the symptoms have worsened since her neck pain onset.  She denies any worsening of her lumbar back pain.  She denies any saddle anesthesia or bladder and bowel incontinence.    She also reports she has been feeling some pressure sensation on both her ears.  She recently had a course of azithromycin that was prescribed by urgent care for possible ear infection.    She also reports she has been feeling like she is tremoring.  She denies any visible or physical tremor, but feels an internal sensation of tremoring.  She reports that she went to the emergency department for this symptom as well, but all blood work and urine were within normal limits.  They had no explanation for this sensation.  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  She denies that she would like to be started on any medications for anxiety/depression due to fear that they could cause birth defects as she is not currently on birth  control.  She denies SI/HI.  She reports that this internal sensation of tremor usually happens at night, is not constant.  It has been present for the past 2 days.  She denies any blurry vision, syncope, dizziness, headache, or any other neurologic symptoms.      Subjective     Review of Systems:   Review of Systems   Constitutional: Negative for chills, fever and unexpected weight loss.   Eyes: Negative for blurred vision and double vision.   Respiratory: Negative for shortness of breath.    Cardiovascular: Negative for chest pain.   Gastrointestinal: Negative for abdominal pain.   Genitourinary: Negative for dysuria, pelvic pain and urinary incontinence.   Musculoskeletal: Positive for arthralgias, neck pain and neck stiffness.   Neurological: Positive for tremors. Negative for dizziness, seizures, syncope, facial asymmetry, weakness, light-headedness, numbness, headache and confusion.       I have reviewed the patients family history, social history, past medical history, past surgical history and have updated it as appropriate.     Past Medical History:   Past Medical History:   Diagnosis Date   • Endometriosis    • Gestational diabetes     w/ first 3 pregnancy   • Obesity    • PCOS (polycystic ovarian syndrome)        Past Surgical History:   Past Surgical History:   Procedure Laterality Date   •  SECTION  2014   • CHOLECYSTECTOMY  01/15/2018   • TONSILLECTOMY         Family History:   Family History   Problem Relation Age of Onset   • Diabetes Mother    • Hypertension Mother    • Migraines Mother    • Neuropathy Mother    • Arthritis Mother    • Diabetes Father    • Heart disease Father         chf   • Heart attack Father    • Heart disease Paternal Grandfather        Social History:   Social History     Socioeconomic History   • Marital status:    Tobacco Use   • Smoking status: Former     Packs/day: 0.25     Years: 2.00     Pack years: 0.50     Types: Cigarettes     Quit date:  "2019     Years since quittin.1     Passive exposure: Never   • Smokeless tobacco: Never   Vaping Use   • Vaping Use: Never used   Substance and Sexual Activity   • Alcohol use: Not Currently     Comment: Socially    • Drug use: Yes     Types: Marijuana     Comment: reactional   • Sexual activity: Yes     Partners: Male     Birth control/protection: None       Immunizations:   There is no immunization history on file for this patient.     Medications:     Current Outpatient Medications:   •  cyclobenzaprine (FLEXERIL) 5 MG tablet, Take 1 tablet by mouth 3 (Three) Times a Day As Needed for Muscle Spasms., Disp: 20 tablet, Rfl: 0  •  fluticasone (FLONASE) 50 MCG/ACT nasal spray, 2 sprays into the nostril(s) as directed by provider Daily., Disp: 9.9 mL, Rfl: 0  •  naproxen (NAPROSYN) 250 MG tablet, Take 1 tablet by mouth 2 (Two) Times a Day As Needed (Pain)., Disp: 30 tablet, Rfl: 0    Allergies:   Allergies   Allergen Reactions   • Amoxicillin Hives   • Cephalosporins Hives and Angioedema   • Latex Anaphylaxis and Hives   • Sulfa Antibiotics Hives   • Erythromycin Hives     All mycin drugs   • Clindamycin/Lincomycin Hives, Nausea Only, Dizziness and Confusion       Objective     Vital Signs  Vitals:    23 1130   BP: 118/82   BP Location: Right arm   Patient Position: Sitting   Cuff Size: Adult   Pulse: 88   Resp: 18   Temp: 97 °F (36.1 °C)   TempSrc: Temporal   SpO2: 99%   Weight: 110 kg (243 lb)   Height: 160 cm (63\")     Estimated body mass index is 43.05 kg/m² as calculated from the following:    Height as of this encounter: 160 cm (63\").    Weight as of this encounter: 110 kg (243 lb).          Physical Exam  Vitals and nursing note reviewed.   Constitutional:       General: She is not in acute distress.     Appearance: Normal appearance. She is not ill-appearing, toxic-appearing or diaphoretic.   HENT:      Head: Normocephalic and atraumatic.   Eyes:      Extraocular Movements: Extraocular movements " intact.      Pupils: Pupils are equal, round, and reactive to light.   Neck:     Cardiovascular:      Rate and Rhythm: Normal rate and regular rhythm.      Heart sounds: No murmur heard.    No friction rub. No gallop.   Pulmonary:      Effort: Pulmonary effort is normal. No respiratory distress.      Breath sounds: No wheezing, rhonchi or rales.   Musculoskeletal:      Right shoulder: No tenderness or bony tenderness. Normal range of motion. Normal strength.      Left shoulder: No tenderness or bony tenderness. Normal range of motion. Normal strength.      Right elbow: Normal range of motion.      Left elbow: Normal range of motion.      Cervical back: No edema, erythema or rigidity. Pain with movement and muscular tenderness present. No spinous process tenderness. Decreased range of motion (Decreased cervical rotation due to pain).   Skin:     Coloration: Skin is not pale.   Neurological:      General: No focal deficit present.      Mental Status: She is alert and oriented to person, place, and time. Mental status is at baseline.      Cranial Nerves: Cranial nerves 2-12 are intact. No dysarthria or facial asymmetry.      Motor: No tremor.      Coordination: Romberg sign negative. Coordination normal. Finger-Nose-Finger Test and Heel to Shin Test normal.      Gait: Gait normal.   Psychiatric:         Mood and Affect: Mood normal.         Thought Content: Thought content normal.          Assessment and Plan     1. Cervicalgia  -Prescriptions have been sent for Flexeril and naproxen.  We did discuss that she should not drive or operate machinery or watch her children alone after taking Flexeril, as it can make her drowsy.  -We discussed other supportive care options such as rotating cold compresses and heating pads, activity modification, Salonpas patches, etc.  -I have recommended physical therapy referral for evaluation and treatment.  -We did discuss red flag symptoms of neck pain.  If she develops any red flag  symptoms, she needs to go to the emergency room immediately.  -If symptoms worsen or new symptoms develop, I have encouraged her to return to care.  -The patient verbalized understanding and had no further questions.  - Ambulatory Referral to Physical Therapy Evaluate and treat  - cyclobenzaprine (FLEXERIL) 5 MG tablet; Take 1 tablet by mouth 3 (Three) Times a Day As Needed for Muscle Spasms.  Dispense: 20 tablet; Refill: 0  - naproxen (NAPROSYN) 250 MG tablet; Take 1 tablet by mouth 2 (Two) Times a Day As Needed (Pain).  Dispense: 30 tablet; Refill: 0    2. Tremor  -Physical exam is within normal limits.  No visible tremor seen.  -We did discuss that stress and anxiety could cause a sensation of internal tremor.  She is not interested in anxiety medications at this time due to concerns that could cause birth defects if she were to become pregnant.  She denies significant depression symptoms.  She denies SI/HI.  -Blood work ordered for further evaluation.  -If symptoms are persistent or new symptoms develop, she has been encouraged to return to care.  -Patient verbalizes understanding and has no further questions.  - TSH Rfx On Abnormal To Free T4; Future  - Comprehensive Metabolic Panel; Future  - CBC (No Diff); Future  - Iron Profile; Future  - Ferritin; Future    3. Dysfunction of both eustachian tubes  -Prescription for Flonase has been sent to help alleviate any eustachian tube dysfunction.  - fluticasone (FLONASE) 50 MCG/ACT nasal spray; 2 sprays into the nostril(s) as directed by provider Daily.  Dispense: 9.9 mL; Refill: 0       Follow Up  Return in about 2 months (around 7/30/2023) for Annual physical.    Jessy Salguero PA-C  [facility]

## 2023-08-01 ENCOUNTER — OFFICE VISIT (OUTPATIENT)
Dept: FAMILY MEDICINE CLINIC | Facility: CLINIC | Age: 28
End: 2023-08-01
Payer: COMMERCIAL

## 2023-08-01 VITALS
DIASTOLIC BLOOD PRESSURE: 72 MMHG | SYSTOLIC BLOOD PRESSURE: 110 MMHG | WEIGHT: 247 LBS | HEART RATE: 86 BPM | TEMPERATURE: 97.2 F | RESPIRATION RATE: 16 BRPM | HEIGHT: 63 IN | OXYGEN SATURATION: 97 % | BODY MASS INDEX: 43.77 KG/M2

## 2023-08-01 DIAGNOSIS — M54.2 CERVICALGIA: Primary | ICD-10-CM

## 2023-08-01 DIAGNOSIS — Z86.39 HISTORY OF VITAMIN D DEFICIENCY: ICD-10-CM

## 2023-08-01 DIAGNOSIS — R25.1 TREMOR: ICD-10-CM

## 2023-08-01 DIAGNOSIS — F32.0 CURRENT MILD EPISODE OF MAJOR DEPRESSIVE DISORDER, UNSPECIFIED WHETHER RECURRENT: ICD-10-CM

## 2023-08-01 DIAGNOSIS — E66.01 MORBID (SEVERE) OBESITY DUE TO EXCESS CALORIES: ICD-10-CM

## 2023-08-01 DIAGNOSIS — Z00.00 ROUTINE GENERAL MEDICAL EXAMINATION AT A HEALTH CARE FACILITY: ICD-10-CM

## 2023-08-01 DIAGNOSIS — Z23 NEED FOR TDAP VACCINATION: ICD-10-CM

## 2023-08-01 RX ORDER — KETOCONAZOLE 20 MG/ML
SHAMPOO TOPICAL
COMMUNITY
Start: 2023-06-28

## 2023-08-01 RX ORDER — BETAMETHASONE DIPROPIONATE 0.5 MG/G
LOTION TOPICAL
COMMUNITY
Start: 2023-06-30

## 2023-08-28 ENCOUNTER — TELEMEDICINE (OUTPATIENT)
Dept: FAMILY MEDICINE CLINIC | Facility: TELEHEALTH | Age: 28
End: 2023-08-28
Payer: COMMERCIAL

## 2023-08-28 DIAGNOSIS — Z20.818 EXPOSURE TO STREP THROAT: Primary | ICD-10-CM

## 2023-08-28 DIAGNOSIS — J02.9 ACUTE PHARYNGITIS, UNSPECIFIED ETIOLOGY: ICD-10-CM

## 2023-08-28 RX ORDER — METHOCARBAMOL 750 MG/1
TABLET, FILM COATED ORAL
COMMUNITY
Start: 2023-05-25 | End: 2023-08-31

## 2023-08-28 RX ORDER — AZITHROMYCIN 250 MG/1
TABLET, FILM COATED ORAL
Qty: 6 TABLET | Refills: 0 | Status: SHIPPED | OUTPATIENT
Start: 2023-08-28

## 2023-08-28 RX ORDER — KETOROLAC TROMETHAMINE 10 MG/1
TABLET, FILM COATED ORAL
COMMUNITY
Start: 2023-05-25 | End: 2023-08-31

## 2023-08-28 NOTE — PATIENT INSTRUCTIONS
Strep Throat, Adult  Strep throat is an infection of the throat. It is caused by germs (bacteria). Strep throat is common during the cold months of the year. It mostly affects children who are 5-15 years old. However, people of all ages can get it at any time of the year. This infection spreads from person to person through coughing, sneezing, or having close contact.  What are the causes?  This condition is caused by the Streptococcus pyogenes germ.  What increases the risk?  You care for young children. Children are more likely to get strep throat and may spread it to others.  You go to crowded places. Germs can spread easily in such places.  You kiss or touch someone who has strep throat.  What are the signs or symptoms?  Fever or chills.  Redness, swelling, or pain in the tonsils or throat.  Pain or trouble when swallowing.  White or yellow spots on the tonsils or throat.  Tender glands in the neck and under the jaw.  Bad breath.  Red rash all over the body. This is rare.  How is this treated?  Medicines that kill germs (antibiotics).  Medicines that treat pain or fever. These include:  Ibuprofen or acetaminophen.  Aspirin, only for people who are over the age of 18.  Cough drops.  Throat sprays.  Follow these instructions at home:  Medicines    Take over-the-counter and prescription medicines only as told by your doctor.  Take your antibiotic medicine as told by your doctor. Do not stop taking the antibiotic even if you start to feel better.  Eating and drinking    If you have trouble swallowing, eat soft foods until your throat feels better.  Drink enough fluid to keep your pee (urine) pale yellow.  To help with pain, you may have:  Warm fluids, such as soup and tea.  Cold fluids, such as frozen desserts or popsicles.  General instructions  Rinse your mouth (gargle) with a salt-water mixture 3-4 times a day or as needed. To make a salt-water mixture, dissolve «-1 tsp (3-6 g) of salt in 1 cup (237 mL) of warm  water.  Rest as much as you can.  Stay home from work or school until you have been taking antibiotics for 24 hours.  Do not smoke or use any products that contain nicotine or tobacco. If you need help quitting, ask your doctor.  Keep all follow-up visits.  How is this prevented?    Do not share food, drinking cups, or personal items. They can cause the germs to spread.  Wash your hands well with soap and water. Make sure that all people in your house wash their hands well.  Have family members tested if they have a fever or a sore throat. They may need an antibiotic if they have strep throat.  Contact a doctor if:  You have swelling in your neck that keeps getting bigger.  You get a rash, cough, or earache.  You cough up a thick fluid that is green, yellow-brown, or bloody.  You have pain that does not get better with medicine.  Your symptoms get worse instead of getting better.  You have a fever.  Get help right away if:  You vomit.  You have a very bad headache.  Your neck hurts or feels stiff.  You have chest pain or are short of breath.  You have drooling, very bad throat pain, or changes in your voice.  Your neck is swollen, or the skin gets red and tender.  Your mouth is dry, or you are peeing less than normal.  You keep feeling more tired or have trouble waking up.  Your joints are red or painful.  These symptoms may be an emergency. Do not wait to see if the symptoms will go away. Get help right away. Call your local emergency services (911 in the [place]  Summary  Strep throat is an infection of the throat. It is caused by germs (bacteria).  This infection can spread from person to person through coughing, sneezing, or having close contact.  Take your medicines, including antibiotics, as told by your doctor. Do not stop taking the antibiotic even if you start to feel better.  To prevent the spread of germs, wash your hands well with soap and water. Have others do the same. Do not share food, drinking cups,  or personal items.  Get help right away if you have a bad headache, chest pain, shortness of breath, a stiff or painful neck, or you vomit.  This information is not intended to replace advice given to you by your health care provider. Make sure you discuss any questions you have with your health care provider.  Document Revised: 04/12/2022 Document Reviewed: 04/12/2022  [email or web address] Patient Education c 2023 [employer]

## 2023-08-28 NOTE — PROGRESS NOTES
Chief Complaint   Patient presents with    Sore Throat       Video Visit Reason:   Free Text Description: My kids have strep throat and I'm positive I do to as well  Subjective   Leona Murillo is a 28 y.o. female.     History of Present Illness  Sore throat, enlarged, tender lymph nodes on the left side of the neck and general malaise with children testing positive for strep. No fever, chills.  Sore Throat   This is a new problem. The current episode started today. The problem has been gradually worsening. There has been no fever. Associated symptoms include headaches, neck pain and swollen glands. Pertinent negatives include no shortness of breath or trouble swallowing. She has had exposure to strep. She has had no exposure to mono.     The following portions of the patient's history were reviewed and updated as appropriate: allergies, current medications, past medical history, and problem list.      Past Medical History:   Diagnosis Date    Endometriosis     Gestational diabetes     w/ first 3 pregnancy    Obesity     PCOS (polycystic ovarian syndrome)      Social History     Socioeconomic History    Marital status:    Tobacco Use    Smoking status: Former     Packs/day: 0.25     Years: 2.00     Pack years: 0.50     Types: Cigarettes     Quit date: 2019     Years since quittin.3     Passive exposure: Past    Smokeless tobacco: Never   Vaping Use    Vaping Use: Never used   Substance and Sexual Activity    Alcohol use: Not Currently     Comment: Socially     Drug use: Yes     Types: Marijuana     Comment: reactional    Sexual activity: Yes     Partners: Male     Birth control/protection: None     medication documentation: reviewed and updated with patient and   Current Outpatient Medications:     betamethasone dipropionate 0.05 % lotion, APPLY LOTION TOPICALLY TO SCALP AS NEEDED FOR ITCHING, Disp: , Rfl:     ketoconazole (NIZORAL) 2 % shampoo, MASSAGE SHAMPOO INTO SCALP EVERY OTHER DAY. LET SIT FOR  5 MINUTES, THEN RINSE. FOLLOW WITH REGULAR SHAMPOO AND CONDITIONER, Disp: , Rfl:     ketorolac (TORADOL) 10 MG tablet, Every 6 Hours as needed for Pain, Disp: , Rfl:     methocarbamol (ROBAXIN) 750 MG tablet, Three Times A Day as needed for Spasm, Disp: , Rfl:     azithromycin (Zithromax Z-Macario) 250 MG tablet, Take 2 tabs on Day 1, then 1 tab daily for 4 additional days, Disp: 6 tablet, Rfl: 0  Review of Systems   Constitutional:  Positive for fatigue. Negative for chills and fever.   HENT:  Positive for sore throat. Negative for postnasal drip and trouble swallowing.    Respiratory:  Negative for chest tightness and shortness of breath.    Musculoskeletal:  Positive for neck pain.   Neurological:  Positive for headaches.   Hematological:  Positive for adenopathy.     Objective   Physical Exam  Constitutional:       General: She is not in acute distress.     Appearance: She is ill-appearing.   HENT:      Nose: No congestion.      Mouth/Throat:      Pharynx: Posterior oropharyngeal erythema present. No oropharyngeal exudate.   Neurological:      Mental Status: She is alert.   Psychiatric:         Mood and Affect: Mood normal.       Assessment & Plan   Diagnoses and all orders for this visit:    1. Exposure to strep throat (Primary)  -     azithromycin (Zithromax Z-Macario) 250 MG tablet; Take 2 tabs on Day 1, then 1 tab daily for 4 additional days  Dispense: 6 tablet; Refill: 0    2. Acute pharyngitis, unspecified etiology  -     azithromycin (Zithromax Z-Macario) 250 MG tablet; Take 2 tabs on Day 1, then 1 tab daily for 4 additional days  Dispense: 6 tablet; Refill: 0                    Follow Up:  If your symptoms are not resolving by the completion of your treatment or are worsening, see your primary care provider for follow up. If you don't have a primary care provider, you may go to any Urgent Care for re-evaluation. If you develop any life threatening symptoms, go to the nearest Emergency Department immediately or call  EMS.               The use of  Video Visit was utilized during this visit, using both [employer] and [employer]. The use of a video visit has been reviewed with the patient and verbal informed consent has been obtained. No technical difficulties occurred during the visit.    is located at [address] [place] [id number]  Provider is located at [place], [place]

## 2023-08-31 ENCOUNTER — TELEMEDICINE (OUTPATIENT)
Dept: NEUROLOGY | Facility: CLINIC | Age: 28
End: 2023-08-31
Payer: COMMERCIAL

## 2023-08-31 DIAGNOSIS — G43.719 INTRACTABLE CHRONIC MIGRAINE WITHOUT AURA AND WITHOUT STATUS MIGRAINOSUS: Primary | ICD-10-CM

## 2023-08-31 PROCEDURE — 1159F MED LIST DOCD IN RCRD: CPT | Performed by: NURSE PRACTITIONER

## 2023-08-31 PROCEDURE — 1160F RVW MEDS BY RX/DR IN RCRD: CPT | Performed by: NURSE PRACTITIONER

## 2023-08-31 PROCEDURE — 99422 OL DIG E/M SVC 11-20 MIN: CPT | Performed by: NURSE PRACTITIONER
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
## 2023-09-05 ENCOUNTER — OFFICE VISIT (OUTPATIENT)
Dept: FAMILY MEDICINE CLINIC | Facility: CLINIC | Age: 28
End: 2023-09-05
Payer: COMMERCIAL

## 2023-09-05 VITALS
TEMPERATURE: 98.4 F | DIASTOLIC BLOOD PRESSURE: 70 MMHG | SYSTOLIC BLOOD PRESSURE: 130 MMHG | HEIGHT: 63 IN | WEIGHT: 249.2 LBS | OXYGEN SATURATION: 99 % | BODY MASS INDEX: 44.16 KG/M2 | RESPIRATION RATE: 18 BRPM | HEART RATE: 75 BPM

## 2023-09-05 DIAGNOSIS — Z79.899 MEDICATION MANAGEMENT: Primary | ICD-10-CM

## 2023-09-05 DIAGNOSIS — H61.21 IMPACTED CERUMEN OF RIGHT EAR: ICD-10-CM

## 2023-09-05 DIAGNOSIS — K04.7 DENTAL ABSCESS: ICD-10-CM

## 2023-09-05 RX ORDER — METRONIDAZOLE 500 MG/1
500 TABLET ORAL 3 TIMES DAILY
Qty: 30 TABLET | Refills: 0 | Status: SHIPPED | OUTPATIENT
Start: 2023-09-05 | End: 2023-09-15

## 2023-09-05 NOTE — PROGRESS NOTES
Office Note     Name: Leona Murillo    : 1995     MRN: [id number]     Chief Complaint  Gum abscess, ear fullness    History of Present Illness:  Leona Murillo is a 28 y.o. female who presents today for concerns of a abscess on her gumline.  She reports gum swelling of her lower gumline for the past several days.  She reports that the swelling will wax and wane.  She reports that she has tried to call and get into a dentist but cannot be seen urgently.  She was encouraged by dentist to make an appointment with her PCP for antibiotics.  She is trying to get scheduled for follow-up with dental.  She denies any fever or chills.  She does report significant antibiotic allergies to amoxicillin, cephalosporins, sulfa, ciprofloxacin, and clindamycin.  She reports all of these rashes have resulted in hives and significant rash.  She is currently taking azithromycin, as prescribed by telehealth for exposure to strep throat.  She does believe that she has had Flagyl in the past and believes she had no reaction.    She also reports that she has had some ear fullness for the past several days.  No fever or chills.  No drainage from the ear.  No dizziness.      Subjective     Review of Systems:   Review of Systems   Constitutional:  Negative for chills and fever.   HENT:  Positive for dental problem. Negative for congestion, ear discharge, hearing loss and trouble swallowing.    Respiratory:  Negative for shortness of breath.    Cardiovascular:  Negative for chest pain.   Neurological:  Negative for dizziness.     I have reviewed the patients family history, social history, past medical history, past surgical history and have updated it as appropriate.     Past Medical History:   Past Medical History:   Diagnosis Date    Endometriosis     Gestational diabetes     w/ first 3 pregnancy    Obesity     PCOS (polycystic ovarian syndrome)        Past Surgical History:   Past Surgical History:   Procedure Laterality Date      SECTION  2014    CHOLECYSTECTOMY  01/15/2018    TONSILLECTOMY         Family History:   Family History   Problem Relation Age of Onset    Diabetes Mother     Hypertension Mother     Migraines Mother     Neuropathy Mother     Arthritis Mother     Diabetes Father     Heart disease Father         chf    Heart attack Father     Heart disease Paternal Grandfather        Social History:   Social History     Socioeconomic History    Marital status:    Tobacco Use    Smoking status: Former     Packs/day: 0.25     Years: 2.00     Pack years: 0.50     Types: Cigarettes     Quit date: 2019     Years since quittin.3     Passive exposure: Past    Smokeless tobacco: Never   Vaping Use    Vaping Use: Never used   Substance and Sexual Activity    Alcohol use: Not Currently     Comment: Socially     Drug use: Yes     Types: Marijuana     Comment: reactional    Sexual activity: Yes     Partners: Male     Birth control/protection: None       Immunizations:   There is no immunization history on file for this patient.     Medications:     Current Outpatient Medications:     betamethasone dipropionate 0.05 % lotion, APPLY LOTION TOPICALLY TO SCALP AS NEEDED FOR ITCHING, Disp: , Rfl:     ketoconazole (NIZORAL) 2 % shampoo, MASSAGE SHAMPOO INTO SCALP EVERY OTHER DAY. LET SIT FOR 5 MINUTES, THEN RINSE. FOLLOW WITH REGULAR SHAMPOO AND CONDITIONER, Disp: , Rfl:     metroNIDAZOLE (Flagyl) 500 MG tablet, Take 1 tablet by mouth 3 (Three) Times a Day for 10 days., Disp: 30 tablet, Rfl: 0    Allergies:   Allergies   Allergen Reactions    Amoxicillin Hives    Cephalosporins Hives and Angioedema    Latex Anaphylaxis and Hives    Sulfa Antibiotics Hives    Erythromycin Hives     All mycin drugs    Ciprofloxacin Hives    Clindamycin/Lincomycin Hives, Nausea Only, Dizziness and Confusion    Pineapple Unknown - Low Severity       Objective     Vital Signs  Vitals:    23 1048   BP: 130/70   BP Location: Left  "arm   Patient Position: Standing   Cuff Size: Adult   Pulse: 75   Resp: 18   Temp: 98.4 °F (36.9 °C)   TempSrc: Temporal   SpO2: 99%   Weight: 113 kg (249 lb 3.2 oz)   Height: 160 cm (62.99\")     Estimated body mass index is 44.16 kg/m² as calculated from the following:    Height as of this encounter: 160 cm (62.99\").    Weight as of this encounter: 113 kg (249 lb 3.2 oz).            Physical Exam  Vitals and nursing note reviewed.   Constitutional:       General: She is not in acute distress.     Appearance: Normal appearance. She is not ill-appearing, toxic-appearing or diaphoretic.   HENT:      Head: Normocephalic and atraumatic.      Right Ear: Ear canal and external ear normal. Tympanic membrane is not perforated, erythematous, retracted or bulging.      Left Ear: Ear canal and external ear normal. Tympanic membrane is not perforated, erythematous, retracted or bulging.      Ears:      Comments: Impacted cerumen to right canal     Nose: No congestion or rhinorrhea.      Mouth/Throat:      Pharynx: Uvula midline. No pharyngeal swelling, oropharyngeal exudate or posterior oropharyngeal erythema.      Tonsils: 0 on the right. 0 on the left.     Eyes:      General:         Right eye: No discharge.         Left eye: No discharge.      Extraocular Movements: Extraocular movements intact.   Cardiovascular:      Rate and Rhythm: Normal rate and regular rhythm.      Heart sounds: No murmur heard.    No friction rub. No gallop.   Pulmonary:      Effort: No respiratory distress.      Breath sounds: No wheezing, rhonchi or rales.   Musculoskeletal:      Cervical back: Normal range of motion. No rigidity.   Lymphadenopathy:      Cervical: No cervical adenopathy.   Skin:     General: Skin is warm.   Neurological:      Mental Status: She is alert.      Gait: Gait normal.   Psychiatric:         Mood and Affect: Mood normal.         Thought Content: Thought content normal.        Assessment and Plan     1. Dental abscess  -Did " discuss that many of the first-line medication options for dental abscess she is allergic to, medications that we considered include Augmentin, clindamycin, and Levaquin.  However, she reports history of hives and significant allergy to medications in each of these classes.  -I did discuss this with my supervising physician, Dr. Vazquez.  He has recommended treatment with Flagyl alone for now.  -The azithromycin that she was previously on would have provided gram-positive coverage and metronidazole will cover for anaerobes.  -Did encourage her to make close appointment with dentist.  -If symptoms worsen or new symptoms develop, she has been encouraged to return to care.  If she has any intolerance to Flagyl, she has been encouraged to call or return to care.  Did discuss possible side effects of Flagyl.  Also discussed that she should not drink alcohol while she is on the medication.  - metroNIDAZOLE (Flagyl) 500 MG tablet; Take 1 tablet by mouth 3 (Three) Times a Day for 10 days.  Dispense: 30 tablet; Refill: 0    2. Impacted cerumen of right ear  -Ear Cerumen Removal    Date/Time: 9/5/2023 1:14 PM  Performed by: Kely Salguero PA-C  Authorized by: Kely Salguero PA-C     Anesthesia:  Local Anesthetic: none  Location details: right ear  Patient tolerance: patient tolerated the procedure well with no immediate complications  Comments: Irrigation performed by Maryjo ALDANA  Procedure type: irrigation   Sedation:  Patient sedated: no      -Impacted cerumen removed in office via irrigation by MA.  Patient tolerated well.  -Tympanic membrane is normal to otoscope exam following cerumen removal.      3. Medication management  -Point-of-care pregnancy test is negative.  - POCT pregnancy, urine       I spent 38 minutes caring for Leona Murillo on this date of service. This time includes time spent by me in the following activities: preparing for the visit, reviewing tests, performing a medically  appropriate examination and/or evaluation, counseling and educating the patient/family/caregiver, ordering medications, tests, or procedures and documenting information in the medical record.    Follow Up  No follow-ups on file.    DENISE Boston

## 2023-10-02 ENCOUNTER — OFFICE VISIT (OUTPATIENT)
Dept: FAMILY MEDICINE CLINIC | Facility: CLINIC | Age: 28
End: 2023-10-02
Payer: COMMERCIAL

## 2023-10-02 VITALS
WEIGHT: 247 LBS | HEIGHT: 63 IN | RESPIRATION RATE: 16 BRPM | OXYGEN SATURATION: 99 % | BODY MASS INDEX: 43.77 KG/M2 | TEMPERATURE: 98.4 F | SYSTOLIC BLOOD PRESSURE: 134 MMHG | DIASTOLIC BLOOD PRESSURE: 80 MMHG | HEART RATE: 94 BPM

## 2023-10-02 DIAGNOSIS — Z00.00 ROUTINE GENERAL MEDICAL EXAMINATION AT A HEALTH CARE FACILITY: ICD-10-CM

## 2023-10-02 DIAGNOSIS — K04.7 DENTAL ABSCESS: Primary | ICD-10-CM

## 2023-10-02 DIAGNOSIS — E66.01 MORBID (SEVERE) OBESITY DUE TO EXCESS CALORIES: ICD-10-CM

## 2023-10-02 DIAGNOSIS — R74.8 ELEVATED LIVER ENZYMES: ICD-10-CM

## 2023-10-02 DIAGNOSIS — Z01.82 ENCOUNTER FOR ALLERGY TESTING: ICD-10-CM

## 2023-10-02 RX ORDER — AZITHROMYCIN 250 MG/1
TABLET, FILM COATED ORAL SEE ADMIN INSTRUCTIONS
COMMUNITY
Start: 2023-09-27 | End: 2023-10-02

## 2023-10-02 NOTE — PROGRESS NOTES
Office Note     Name: Leona Murillo    : 1995     MRN: [id number]     Chief Complaint  Follow-up (ER follow up on infected root canal.)    History of Present Illness:  Leona Murillo is a 28 y.o. female who presents today for emergency department follow-up.  She reports that she had a root canal on  by a provider in [facility], Dr. Jose Ramon Whatley.  She reports this root canal was performed on her inferior incisors.  She reports that she began having some symptoms of swelling at her inferior gumline and was encouraged to go to the emergency department by the dentist who performed the root canal.  She reports that she presented to [facility] on .  She was given a toothball but no antibiotics.  She then went to [facility] emergency department.  She reports that they treated her with IV vancomycin.  She reports that improved her symptoms.  She reports that the swelling significantly improved and the swelling and the pain in her lower incisor area dramatically improved.  She reports that she went to the emergency department the day after that where a CT scan was performed that did not show dental abscess.  She did not receive any antibiotics at that time.  She does report that she has a follow-up appointment with a dentist tomorrow.  She has had no fever or chills or systemic symptoms.  She reports the swelling and pain has significantly improved.  She is eating and drinking well.  She has had no dizziness, lightheadedness, etc.  Patient reports the ER provider encouraged her to have allergy testing done due to her large number of medication allergies.    She also reports that at the emergency department, she was told that her liver enzymes were elevated and she was very concerned about this.  She does report that she has been taking around 4310-6542 mg of Tylenol per day, as recommended by her dentist for pain.  She denies any abdominal pain.  She said no nausea  and vomiting.  Her bowel movements are normal.  She had no abnormal bleeding or rash.  She denies alcohol use.        Subjective     Review of Systems:   Review of Systems   Constitutional:  Negative for chills and fever.   Gastrointestinal:  Negative for abdominal pain, constipation, diarrhea, nausea and vomiting.   Neurological:  Negative for dizziness, syncope, weakness, light-headedness and headache.     I have reviewed the patients family history, social history, past medical history, past surgical history and have updated it as appropriate.     Past Medical History:   Past Medical History:   Diagnosis Date   • Endometriosis    • Gestational diabetes     w/ first 3 pregnancy   • Obesity    • PCOS (polycystic ovarian syndrome)        Past Surgical History:   Past Surgical History:   Procedure Laterality Date   •  SECTION  2014   • CHOLECYSTECTOMY  01/15/2018   • ROOT CANAL  2023    tooth numbers 24, 25   • TONSILLECTOMY         Family History:   Family History   Problem Relation Age of Onset   • Diabetes Mother    • Hypertension Mother    • Migraines Mother    • Neuropathy Mother    • Arthritis Mother    • Diabetes Father    • Heart disease Father         chf   • Heart attack Father    • Heart disease Paternal Grandfather        Social History:   Social History     Socioeconomic History   • Marital status:    Tobacco Use   • Smoking status: Former     Packs/day: 0.25     Years: 2.00     Pack years: 0.50     Types: Cigarettes     Start date:      Quit date: 2019     Years since quittin.4     Passive exposure: Past   • Smokeless tobacco: Never   Vaping Use   • Vaping Use: Never used   Substance and Sexual Activity   • Alcohol use: Not Currently     Comment: Socially    • Drug use: Yes     Types: Marijuana     Comment: reactional   • Sexual activity: Yes     Partners: Male     Birth control/protection: None       Immunizations:   There is no immunization history on file  "for this patient.     Medications:     Current Outpatient Medications:   •  betamethasone dipropionate 0.05 % lotion, APPLY LOTION TOPICALLY TO SCALP AS NEEDED FOR ITCHING, Disp: , Rfl:   •  ketoconazole (NIZORAL) 2 % shampoo, MASSAGE SHAMPOO INTO SCALP EVERY OTHER DAY. LET SIT FOR 5 MINUTES, THEN RINSE. FOLLOW WITH REGULAR SHAMPOO AND CONDITIONER, Disp: , Rfl:     Allergies:   Allergies   Allergen Reactions   • Amoxicillin Hives   • Cephalosporins Hives and Angioedema   • Latex Anaphylaxis and Hives   • Sulfa Antibiotics Hives   • Erythromycin Hives     All mycin drugs   • Ciprofloxacin Hives   • Clindamycin/Lincomycin Hives, Nausea Only, Dizziness and Confusion   • Pineapple Unknown - Low Severity       Objective     Vital Signs  Vitals:    10/02/23 0944 10/02/23 0947   BP: (!) 166/110 134/80   BP Location: Left arm Right arm   Patient Position: Sitting Sitting   Cuff Size: Adult Large Adult   Pulse: 94    Resp: 16    Temp: 98.4 °F (36.9 °C)    TempSrc: Temporal    SpO2: 99%    Weight: 112 kg (247 lb)    Height: 160 cm (63\")      Estimated body mass index is 43.75 kg/m² as calculated from the following:    Height as of this encounter: 160 cm (63\").    Weight as of this encounter: 112 kg (247 lb).            Physical Exam  Vitals and nursing note reviewed.   Constitutional:       General: She is not in acute distress.     Appearance: Normal appearance. She is not ill-appearing, toxic-appearing or diaphoretic.   HENT:      Head: Atraumatic.      Mouth/Throat:      Comments: No swelling, erythema, or discharge noted to the anterior or posterior gumline near her lower central incisors, she has no tenderness to palpation of her gumline  Eyes:      Extraocular Movements: Extraocular movements intact.   Cardiovascular:      Rate and Rhythm: Normal rate and regular rhythm.      Heart sounds: No murmur heard.    No friction rub. No gallop.   Pulmonary:      Effort: Pulmonary effort is normal. No respiratory distress.      " Breath sounds: No wheezing, rhonchi or rales.   Abdominal:      General: There is no distension.      Palpations: Abdomen is soft.      Tenderness: There is no abdominal tenderness. There is no right CVA tenderness, left CVA tenderness, guarding or rebound.   Musculoskeletal:      Cervical back: Normal range of motion.      Right lower leg: No edema.      Left lower leg: No edema.   Skin:     General: Skin is warm.   Neurological:      Mental Status: She is alert.      Gait: Gait normal.   Psychiatric:         Mood and Affect: Mood normal.         Thought Content: Thought content normal.        Assessment and Plan     1. Dental abscess  -ER note from [facility] was reviewed.  We will try to obtain ER note from [facility].  -CT of the face was performed on 9-.  There was lucency noted surrounding the mandibular and sinus area and some fat stranding but no soft tissue collection or abscess noted.  -She does not any physical exam findings consistent with dental abscess at this time.  She has no systemic signs of infection.  -I have recommended that she have close follow-up with the dentist, please keep appointment tomorrow to assess how she is healing following root canal and possible further need for antibiotics.  - CBC w AUTO Differential; Future    2. Encounter for allergy testing  -Referral has been placed to allergy specialist for allergy testing, due to her multitude of listed medication allergies.  - Ambulatory Referral to Allergy    3. Elevated liver enzymes  -Blood work has been ordered for further evaluation.  -We did discuss that elevated liver enzymes could be due to increased Tylenol usage recently.  I did encourage her to try to use ibuprofen instead of Tylenol.  We did discuss safe limits for ibuprofen and Tylenol.  We also discussed the importance of abstaining from alcohol use.  -We did discuss that with her weight, she could have some underlying fatty liver disease as well.   Advised her to make dietary modifications and to strive for weight loss.  We may need to proceed with ultrasound to assess for fatty liver disease.  -Hepatitis panel and repeat CMP have been ordered for further evaluation.  - Comprehensive Metabolic Panel; Future  - Hepatitis B surface antigen; Future  - Hepatitis B surface antibody; Future  - Hepatitis B core antibody, total; Future  - Hepatitis A antibody, total; Future  - Hepatitis C antibody; Future    4. Morbid (severe) obesity due to excess calories  -We discussed that the foundation of weight loss should be diet and exercise.  We discussed the importance of a well-balanced diet.  We discussed sustainable dietary changes, such as decreasing portion size, cutting out high calorie drinks, abstaining from eating late at night when metabolism is at its slowest, and trying to reduce fatty and fried food intake.  I also recommended daily exercise, ideally 150 minutes of aerobic activity weekly.  -Patient does have a referral to bariatrics in place.  - Hemoglobin A1c; Future  - Lipid Panel; Future    5. Routine general medical examination at a health care facility  -Due to obtaining blood work today, other health maintenance labs have been ordered for further evaluation.  - TSH Rfx On Abnormal To Free T4; Future  - Urinalysis With Culture If Indicated -; Future       Follow Up  No follow-ups on file.    DENISE Boston

## 2023-10-09 LAB
BASOPHILS # BLD AUTO: 0.06 10*3/MM3 (ref 0–0.2)
BASOPHILS NFR BLD AUTO: 0.7 % (ref 0–1.5)
BILIRUB UR QL STRIP: NEGATIVE
CLARITY UR: ABNORMAL
COLOR UR: YELLOW
DEPRECATED RDW RBC AUTO: 39.1 FL (ref 37–54)
EOSINOPHIL # BLD AUTO: 0.2 10*3/MM3 (ref 0–0.4)
EOSINOPHIL NFR BLD AUTO: 2.4 % (ref 0.3–6.2)
ERYTHROCYTE [DISTWIDTH] IN BLOOD BY AUTOMATED COUNT: 11.7 % (ref 12.3–15.4)
GLUCOSE UR STRIP-MCNC: NEGATIVE MG/DL
HBA1C MFR BLD: 5.4 % (ref 4.8–5.6)
HBV SURFACE AB SER RIA-ACNC: NORMAL
HBV SURFACE AG SERPL QL IA: NORMAL
HCT VFR BLD AUTO: 37 % (ref 34–46.6)
HCV AB SER DONR QL: NORMAL
HGB BLD-MCNC: 12.7 G/DL (ref 12–15.9)
HGB UR QL STRIP.AUTO: NEGATIVE
HOLD SPECIMEN: NORMAL
IMM GRANULOCYTES # BLD AUTO: 0.01 10*3/MM3 (ref 0–0.05)
IMM GRANULOCYTES NFR BLD AUTO: 0.1 % (ref 0–0.5)
KETONES UR QL STRIP: NEGATIVE
LEUKOCYTE ESTERASE UR QL STRIP.AUTO: NEGATIVE
LYMPHOCYTES # BLD AUTO: 2.82 10*3/MM3 (ref 0.7–3.1)
LYMPHOCYTES NFR BLD AUTO: 33.7 % (ref 19.6–45.3)
MCH RBC QN AUTO: 31.4 PG (ref 26.6–33)
MCHC RBC AUTO-ENTMCNC: 34.3 G/DL (ref 31.5–35.7)
MCV RBC AUTO: 91.4 FL (ref 79–97)
MONOCYTES # BLD AUTO: 0.63 10*3/MM3 (ref 0.1–0.9)
MONOCYTES NFR BLD AUTO: 7.5 % (ref 5–12)
NEUTROPHILS NFR BLD AUTO: 4.64 10*3/MM3 (ref 1.7–7)
NEUTROPHILS NFR BLD AUTO: 55.6 % (ref 42.7–76)
NITRITE UR QL STRIP: NEGATIVE
NRBC BLD AUTO-RTO: 0 /100 WBC (ref 0–0.2)
PH UR STRIP.AUTO: 6 [PH] (ref 5–8)
PLATELET # BLD AUTO: 293 10*3/MM3 (ref 140–450)
PMV BLD AUTO: 11 FL (ref 6–12)
PROT UR QL STRIP: NEGATIVE
RBC # BLD AUTO: 4.05 10*6/MM3 (ref 3.77–5.28)
SP GR UR STRIP: 1.03 (ref 1–1.03)
UROBILINOGEN UR QL STRIP: ABNORMAL
WBC NRBC COR # BLD: 8.36 10*3/MM3 (ref 3.4–10.8)

## 2023-10-09 PROCEDURE — 86706 HEP B SURFACE ANTIBODY: CPT

## 2023-10-09 PROCEDURE — 80061 LIPID PANEL: CPT

## 2023-10-09 PROCEDURE — 81003 URINALYSIS AUTO W/O SCOPE: CPT

## 2023-10-09 PROCEDURE — 80050 GENERAL HEALTH PANEL: CPT

## 2023-10-09 PROCEDURE — 83036 HEMOGLOBIN GLYCOSYLATED A1C: CPT

## 2023-10-09 PROCEDURE — 86704 HEP B CORE ANTIBODY TOTAL: CPT

## 2023-10-09 PROCEDURE — 86803 HEPATITIS C AB TEST: CPT

## 2023-10-09 PROCEDURE — 87340 HEPATITIS B SURFACE AG IA: CPT

## 2023-10-09 PROCEDURE — 86708 HEPATITIS A ANTIBODY: CPT

## 2023-10-10 LAB
ALBUMIN SERPL-MCNC: 4.6 G/DL (ref 3.5–5.2)
ALBUMIN/GLOB SERPL: 1.8 G/DL
ALP SERPL-CCNC: 76 U/L (ref 39–117)
ALT SERPL W P-5'-P-CCNC: 47 U/L (ref 1–33)
ANION GAP SERPL CALCULATED.3IONS-SCNC: 11.3 MMOL/L (ref 5–15)
AST SERPL-CCNC: 30 U/L (ref 1–32)
BILIRUB SERPL-MCNC: 0.3 MG/DL (ref 0–1.2)
BUN SERPL-MCNC: 15 MG/DL (ref 6–20)
BUN/CREAT SERPL: 24.2 (ref 7–25)
CALCIUM SPEC-SCNC: 9.9 MG/DL (ref 8.6–10.5)
CHLORIDE SERPL-SCNC: 104 MMOL/L (ref 98–107)
CHOLEST SERPL-MCNC: 145 MG/DL (ref 0–200)
CO2 SERPL-SCNC: 22.7 MMOL/L (ref 22–29)
CREAT SERPL-MCNC: 0.62 MG/DL (ref 0.57–1)
EGFRCR SERPLBLD CKD-EPI 2021: 123.8 ML/MIN/1.73
GLOBULIN UR ELPH-MCNC: 2.5 GM/DL
GLUCOSE SERPL-MCNC: 114 MG/DL (ref 65–99)
HDLC SERPL-MCNC: 33 MG/DL (ref 40–60)
LDLC SERPL CALC-MCNC: 79 MG/DL (ref 0–100)
LDLC/HDLC SERPL: 2.22 {RATIO}
POTASSIUM SERPL-SCNC: 4 MMOL/L (ref 3.5–5.2)
PROT SERPL-MCNC: 7.1 G/DL (ref 6–8.5)
SODIUM SERPL-SCNC: 138 MMOL/L (ref 136–145)
TRIGL SERPL-MCNC: 194 MG/DL (ref 0–150)
TSH SERPL DL<=0.05 MIU/L-ACNC: 1.9 UIU/ML (ref 0.27–4.2)
VLDLC SERPL-MCNC: 33 MG/DL (ref 5–40)

## 2023-10-11 LAB
HAV AB SER QL IA: NEGATIVE
HBV CORE AB SERPL QL IA: NEGATIVE

## 2023-10-15 DIAGNOSIS — E66.01 MORBID (SEVERE) OBESITY DUE TO EXCESS CALORIES: Primary | ICD-10-CM

## 2023-10-20 ENCOUNTER — OFFICE VISIT (OUTPATIENT)
Dept: FAMILY MEDICINE CLINIC | Facility: CLINIC | Age: 28
End: 2023-10-20
Payer: COMMERCIAL

## 2023-10-20 VITALS
BODY MASS INDEX: 43.59 KG/M2 | DIASTOLIC BLOOD PRESSURE: 80 MMHG | OXYGEN SATURATION: 97 % | HEART RATE: 85 BPM | RESPIRATION RATE: 18 BRPM | HEIGHT: 63 IN | WEIGHT: 246 LBS | TEMPERATURE: 98 F | SYSTOLIC BLOOD PRESSURE: 130 MMHG

## 2023-10-20 DIAGNOSIS — R82.998 LEUKOCYTES IN URINE: ICD-10-CM

## 2023-10-20 DIAGNOSIS — R10.10 PAIN OF UPPER ABDOMEN: Primary | ICD-10-CM

## 2023-10-20 DIAGNOSIS — M79.605 LEFT LEG PAIN: ICD-10-CM

## 2023-10-20 LAB
ALBUMIN SERPL-MCNC: 4.3 G/DL (ref 3.5–5.2)
ALBUMIN/GLOB SERPL: 1.7 G/DL
ALP SERPL-CCNC: 70 U/L (ref 39–117)
ALT SERPL W P-5'-P-CCNC: 35 U/L (ref 1–33)
ANION GAP SERPL CALCULATED.3IONS-SCNC: 8.4 MMOL/L (ref 5–15)
AST SERPL-CCNC: 24 U/L (ref 1–32)
B-HCG UR QL: NEGATIVE
BASOPHILS # BLD AUTO: 0.05 10*3/MM3 (ref 0–0.2)
BASOPHILS NFR BLD AUTO: 0.7 % (ref 0–1.5)
BILIRUB BLD-MCNC: NEGATIVE MG/DL
BILIRUB SERPL-MCNC: 0.4 MG/DL (ref 0–1.2)
BUN SERPL-MCNC: 13 MG/DL (ref 6–20)
BUN/CREAT SERPL: 16.3 (ref 7–25)
CALCIUM SPEC-SCNC: 9.3 MG/DL (ref 8.6–10.5)
CHLORIDE SERPL-SCNC: 108 MMOL/L (ref 98–107)
CLARITY, POC: ABNORMAL
CO2 SERPL-SCNC: 22.6 MMOL/L (ref 22–29)
COLOR UR: YELLOW
CREAT SERPL-MCNC: 0.8 MG/DL (ref 0.57–1)
DEPRECATED RDW RBC AUTO: 40.8 FL (ref 37–54)
EGFRCR SERPLBLD CKD-EPI 2021: 102.4 ML/MIN/1.73
EOSINOPHIL # BLD AUTO: 0.18 10*3/MM3 (ref 0–0.4)
EOSINOPHIL NFR BLD AUTO: 2.6 % (ref 0.3–6.2)
ERYTHROCYTE [DISTWIDTH] IN BLOOD BY AUTOMATED COUNT: 12.2 % (ref 12.3–15.4)
EXPIRATION DATE: ABNORMAL
EXPIRATION DATE: NORMAL
GLOBULIN UR ELPH-MCNC: 2.6 GM/DL
GLUCOSE SERPL-MCNC: 105 MG/DL (ref 65–99)
GLUCOSE UR STRIP-MCNC: NEGATIVE MG/DL
HCT VFR BLD AUTO: 41 % (ref 34–46.6)
HGB BLD-MCNC: 13.7 G/DL (ref 12–15.9)
IMM GRANULOCYTES # BLD AUTO: 0.01 10*3/MM3 (ref 0–0.05)
IMM GRANULOCYTES NFR BLD AUTO: 0.1 % (ref 0–0.5)
INTERNAL NEGATIVE CONTROL: NEGATIVE
INTERNAL POSITIVE CONTROL: POSITIVE
KETONES UR QL: NEGATIVE
LEUKOCYTE EST, POC: ABNORMAL
LIPASE SERPL-CCNC: 21 U/L (ref 13–60)
LYMPHOCYTES # BLD AUTO: 2.01 10*3/MM3 (ref 0.7–3.1)
LYMPHOCYTES NFR BLD AUTO: 28.7 % (ref 19.6–45.3)
Lab: ABNORMAL
Lab: NORMAL
MCH RBC QN AUTO: 30.9 PG (ref 26.6–33)
MCHC RBC AUTO-ENTMCNC: 33.4 G/DL (ref 31.5–35.7)
MCV RBC AUTO: 92.3 FL (ref 79–97)
MONOCYTES # BLD AUTO: 0.54 10*3/MM3 (ref 0.1–0.9)
MONOCYTES NFR BLD AUTO: 7.7 % (ref 5–12)
NEUTROPHILS NFR BLD AUTO: 4.22 10*3/MM3 (ref 1.7–7)
NEUTROPHILS NFR BLD AUTO: 60.2 % (ref 42.7–76)
NITRITE UR-MCNC: NEGATIVE MG/ML
NRBC BLD AUTO-RTO: 0 /100 WBC (ref 0–0.2)
PH UR: 6 [PH] (ref 5–8)
PLATELET # BLD AUTO: 268 10*3/MM3 (ref 140–450)
PMV BLD AUTO: 10.9 FL (ref 6–12)
POTASSIUM SERPL-SCNC: 4.2 MMOL/L (ref 3.5–5.2)
PROT SERPL-MCNC: 6.9 G/DL (ref 6–8.5)
PROT UR STRIP-MCNC: NEGATIVE MG/DL
RBC # BLD AUTO: 4.44 10*6/MM3 (ref 3.77–5.28)
RBC # UR STRIP: NEGATIVE /UL
SODIUM SERPL-SCNC: 139 MMOL/L (ref 136–145)
SP GR UR: 1.02 (ref 1–1.03)
UROBILINOGEN UR QL: ABNORMAL
WBC NRBC COR # BLD: 7.01 10*3/MM3 (ref 3.4–10.8)

## 2023-10-20 PROCEDURE — 87086 URINE CULTURE/COLONY COUNT: CPT

## 2023-10-20 PROCEDURE — 80053 COMPREHEN METABOLIC PANEL: CPT

## 2023-10-20 PROCEDURE — 83690 ASSAY OF LIPASE: CPT

## 2023-10-20 PROCEDURE — 85025 COMPLETE CBC W/AUTO DIFF WBC: CPT

## 2023-10-20 NOTE — PROGRESS NOTES
Office Note     Name: Leona Murillo    : 1995     MRN: [id number]     Chief Complaint  Left leg pain, upper abdominal pain    History of Present Illness:  Leona Murillo is a 28 y.o. female who presents today for concerns of left leg pain and abdominal pain.    She reports that she is experiencing left leg pain.  She reports that sometime in September, her dog wrapped its chain around her left leg and since then, she has been experiencing pain intermittently.  She did not take a fall and has had no other trauma.  She reports that for the past few days, she has experienced throbbing pain in her left leg, behind the left knee and in the left calf.  She reports that she is concerned about a blood clot.  She does report having varicose veins.  She reports that she has been elevating the leg to try to make it stop hurting.  She has not had any skin redness.  She has no history of blood clots, but does report that her mother has had blood clots before.  At the end of September, she does report that she drove 17 hours in the car.  She reports she has taken ibuprofen at home for the pain.  She is bearing weight without difficulty.    She also reports that for the past 2 weeks, she has been having epigastric pain intermittently.  She reports that it feels like when she had her gallbladder removed, but reports that her gallbladder has been removed since 2018.  The pain is coming and going.  She has had no nausea or vomiting or diarrhea or constipation.  She denies any blood in her stools.  She denies vaginal symptoms.  She denies dysuria, frequency, or hematuria.  She reports that certain foods will trigger the pain such as spicy foods or greasy foods.  She denies ever having acid reflux or heartburn.  She denies pain at present time.  She denies any chest pain.  She denies any of this pain is exertional in nature.  She denies frequent NSAID use or alcohol use.      Subjective     Review of Systems:   Review of  Systems   Constitutional:  Negative for chills and fever.   Respiratory:  Negative for shortness of breath.    Cardiovascular:  Negative for chest pain.   Gastrointestinal:  Positive for abdominal pain. Negative for blood in stool, constipation, diarrhea, nausea and vomiting.   Genitourinary:  Negative for dysuria, frequency, hematuria and vaginal pain.   Musculoskeletal:  Positive for myalgias (L leg).   Skin:  Negative for color change.       I have reviewed the patients family history, social history, past medical history, past surgical history and have updated it as appropriate.     Past Medical History:   Past Medical History:   Diagnosis Date    Endometriosis     Gestational diabetes     w/ first 3 pregnancy    Obesity     PCOS (polycystic ovarian syndrome)        Past Surgical History:   Past Surgical History:   Procedure Laterality Date     SECTION  2014    CHOLECYSTECTOMY  01/15/2018    ROOT CANAL  2023    tooth numbers 24, 25    TONSILLECTOMY         Family History:   Family History   Problem Relation Age of Onset    Diabetes Mother     Hypertension Mother     Migraines Mother     Neuropathy Mother     Arthritis Mother     Diabetes Father     Heart disease Father         chf    Heart attack Father     Heart disease Paternal Grandfather        Social History:   Social History     Socioeconomic History    Marital status:    Tobacco Use    Smoking status: Former     Packs/day: 0.25     Years: 2.00     Additional pack years: 0.00     Total pack years: 0.50     Types: Cigarettes     Start date:      Quit date: 2019     Years since quittin.5     Passive exposure: Past    Smokeless tobacco: Never   Vaping Use    Vaping Use: Never used   Substance and Sexual Activity    Alcohol use: Not Currently     Comment: Socially     Drug use: Yes     Types: Marijuana     Comment: reactional    Sexual activity: Yes     Partners: Male     Birth control/protection: None  "      Immunizations:   There is no immunization history on file for this patient.     Medications:     Current Outpatient Medications:     betamethasone dipropionate 0.05 % lotion, APPLY LOTION TOPICALLY TO SCALP AS NEEDED FOR ITCHING, Disp: , Rfl:     ketoconazole (NIZORAL) 2 % shampoo, MASSAGE SHAMPOO INTO SCALP EVERY OTHER DAY. LET SIT FOR 5 MINUTES, THEN RINSE. FOLLOW WITH REGULAR SHAMPOO AND CONDITIONER, Disp: , Rfl:     Allergies:   Allergies   Allergen Reactions    Amoxicillin Hives    Cephalosporins Hives and Angioedema    Latex Anaphylaxis and Hives    Sulfa Antibiotics Hives    Erythromycin Hives     All mycin drugs    Ciprofloxacin Hives    Clindamycin/Lincomycin Hives, Nausea Only, Dizziness and Confusion    Pineapple Unknown - Low Severity       Objective     Vital Signs  Vitals:    10/20/23 0951   BP: 130/80   BP Location: Left arm   Patient Position: Standing   Cuff Size: Adult   Pulse: 85   Resp: 18   Temp: 98 °F (36.7 °C)   TempSrc: Temporal   SpO2: 97%   Weight: 112 kg (246 lb)   Height: 160 cm (62.99\")     Estimated body mass index is 43.59 kg/m² as calculated from the following:    Height as of this encounter: 160 cm (62.99\").    Weight as of this encounter: 112 kg (246 lb).            Physical Exam  Vitals and nursing note reviewed.   Constitutional:       General: She is not in acute distress.     Appearance: Normal appearance. She is not ill-appearing, toxic-appearing or diaphoretic.   HENT:      Head: Atraumatic.   Eyes:      Extraocular Movements: Extraocular movements intact.   Cardiovascular:      Rate and Rhythm: Normal rate and regular rhythm.      Heart sounds: No murmur heard.     No friction rub. No gallop.   Pulmonary:      Effort: Pulmonary effort is normal. No respiratory distress.      Breath sounds: No wheezing, rhonchi or rales.   Abdominal:      General: There is no distension.      Palpations: Abdomen is soft.      Tenderness: There is abdominal tenderness (Patient reports " mild tenderness to epigastric region, no tenderness to remainder of abdomen). There is no right CVA tenderness, left CVA tenderness, guarding or rebound.   Musculoskeletal:      Cervical back: Normal range of motion.      Right lower leg: No tenderness or bony tenderness. No edema.      Left lower leg: Tenderness present. No bony tenderness. No edema.      Right ankle: No swelling. No tenderness. Normal pulse.      Left ankle: No swelling. No tenderness. Normal pulse.      Comments: Hamstrings and quadriceps strength 5/5 bilaterally  Plantarflexion and dorsiflexion strength 5/5 bilaterally  No erythema or warmth or unilateral swelling present to L leg  Patient does report tenderness to palpation of left calf  R lower leg circumference measured 10 cm below the tibial tuberosity= 48 cm  L lower leg circumference measured 10 cm below the tibial tuberosity= 48 cm   Skin:     General: Skin is warm.   Neurological:      Mental Status: She is alert.      Gait: Gait normal.   Psychiatric:         Mood and Affect: Mood normal.         Thought Content: Thought content normal.          Assessment and Plan     1. Pain of upper abdomen  -Point-of-care pregnancy test is negative.  -Other labs done for further evaluation.  -Patient does have mild tenderness to palpation of epigastric region, but denies experiencing pain at present time.  -Did discuss avoidance of food triggers such as greasy and spicy foods.  Discussed trying to adhere to a bland diet and increasing her water intake.  She has not had any fever or chills or other concerning symptoms at present time.  Have advised dietary modification and watchful waiting for now.  Also discussed possibly treating as heartburn, but she adamantly denies that she is experiencing heartburn symptoms.  -If symptoms persist, we may need to consider ruling out H. pylori and may revisit initiating a PPI.  -She has been advised that if symptoms worsen or she develops other symptoms such as  fever, vomiting, blood in stool or vomit, she is to seek emergent care.  - CBC w AUTO Differential; Future  - Comprehensive Metabolic Panel; Future  - Lipase; Future  - POC Pregnancy, Urine  - POCT urinalysis dipstick, automated  - CBC w AUTO Differential  - Comprehensive Metabolic Panel  - Lipase    2. Leukocytes in urine  -Point-of-care urinalysis does have 1+ leukocytes, but patient denies cystitis symptoms.  We will send for urine culture  - Urine Culture - Urine, Urine, Random Void; Future  - Urine Culture - Urine, Urine, Clean Catch    3. Left leg pain  -Same-day venous Doppler was ordered for patient.  According to Johnathon at [facility], patient is negative for DVT.  Patient was informed.  -I would recommend supportive care options for now: Warm compresses, Tylenol or Motrin, elevating leg for symptom relief.  -As she has had no significant trauma and is bearing weight without difficulty and has no bony tenderness, I believe an x-ray would be low yield.  -If symptoms persist or worsen, she is recommended to return to care.  - US Venous Doppler Lower Extremity Left (duplex); Future       Follow Up  No follow-ups on file.    Jessy Salguero PA-C  [facility] [place], [place] [facility] [place]

## 2023-10-21 LAB — BACTERIA SPEC AEROBE CULT: NORMAL

## 2023-10-23 ENCOUNTER — TELEPHONE (OUTPATIENT)
Dept: FAMILY MEDICINE CLINIC | Facility: CLINIC | Age: 28
End: 2023-10-23
Payer: COMMERCIAL

## 2023-10-23 NOTE — TELEPHONE ENCOUNTER
----- Message from Kely Salguero PA-C sent at 10/23/2023  7:51 AM EDT -----  CBC does not have elevated white blood cell count, does not appear to have active infection.  Lipase, marker for pancreas dysfunction is not elevated.  Urine culture grew out more than 100,000 colonies of mixed beba, typically do not recommend treatment unless she is symptomatic.  She was not having symptoms of UTI when I saw her in the office on Friday.  ALT, liver enzyme, is mildly elevated.  Please try to decrease fatty and fried food intake and carb intake and abstain from any Tylenol and alcohol.

## 2023-10-25 ENCOUNTER — TELEPHONE (OUTPATIENT)
Dept: FAMILY MEDICINE CLINIC | Facility: CLINIC | Age: 28
End: 2023-10-25
Payer: COMMERCIAL

## 2023-10-25 NOTE — TELEPHONE ENCOUNTER
SPOKE WITH THE PATIENT, SHE WILL REACH OUT TO NUTRITIONIST TO SCHEDULE, SHE HAS PREVIOUSLY BEEN CONTACTED BUT THE FACILITY WAS UNABLE TO SCHEDULE THE PATIENT DUE TO A SYSTEM ISSUE.

## 2023-10-25 NOTE — TELEPHONE ENCOUNTER
Caller: Leona Murillo    Relationship: Self    Best call back number: [phone]     What is the best time to reach you: AFTER 12     Who are you requesting to speak with (clinical staff, provider,  specific staff member): REFERRAL COORDINATOR     Do you know the name of the person who called: THE PATIENT LEONA     What was the call regarding: REQUESTING A CALL BACK WITH STATUS OF REFERRAL TO NUTRITIONIST     Is it okay if the provider responds through MyChart: NO, PLEASE CALL AND ADVISE

## 2023-10-30 ENCOUNTER — TELEMEDICINE (OUTPATIENT)
Dept: FAMILY MEDICINE CLINIC | Facility: TELEHEALTH | Age: 28
End: 2023-10-30
Payer: COMMERCIAL

## 2023-10-30 DIAGNOSIS — J02.0 ACUTE STREPTOCOCCAL PHARYNGITIS: Primary | ICD-10-CM

## 2023-10-30 PROCEDURE — 99213 OFFICE O/P EST LOW 20 MIN: CPT | Performed by: NURSE PRACTITIONER

## 2023-10-30 RX ORDER — AZITHROMYCIN 500 MG/1
500 TABLET, FILM COATED ORAL DAILY
Qty: 5 TABLET | Refills: 0 | Status: SHIPPED | OUTPATIENT
Start: 2023-10-30 | End: 2023-11-01

## 2023-10-30 NOTE — PROGRESS NOTES
You have chosen to receive care through a telehealth visit.  Do you consent to use a video/audio connection for your medical care today? Yes     CHIEF COMPLAINT  No chief complaint on file.        HPI  Leona Murillo is a 28 y.o. female  presents with complaint of sore throat that started yesterday.  She has been exposed to strep.  Review of Systems   HENT:  Positive for sore throat.    All other systems reviewed and are negative.      Past Medical History:   Diagnosis Date    Endometriosis     Gestational diabetes     w/ first 3 pregnancy    Obesity     PCOS (polycystic ovarian syndrome)        Family History   Problem Relation Age of Onset    Diabetes Mother     Hypertension Mother     Migraines Mother     Neuropathy Mother     Arthritis Mother     Diabetes Father     Heart disease Father         chf    Heart attack Father     Heart disease Paternal Grandfather        Social History     Socioeconomic History    Marital status:    Tobacco Use    Smoking status: Former     Packs/day: 0.25     Years: 2.00     Additional pack years: 0.00     Total pack years: 0.50     Types: Cigarettes     Start date:      Quit date: 2019     Years since quittin.5     Passive exposure: Past    Smokeless tobacco: Never   Vaping Use    Vaping Use: Never used   Substance and Sexual Activity    Alcohol use: Not Currently     Comment: Socially     Drug use: Yes     Types: Marijuana     Comment: reactional    Sexual activity: Yes     Partners: Male     Birth control/protection: None       Leona Murillo  reports that she quit smoking about 4 years ago. Her smoking use included cigarettes. She started smoking about 8 years ago. She has a 0.50 pack-year smoking history. She has been exposed to tobacco smoke. She has never used smokeless tobacco..           There were no vitals taken for this visit.    PHYSICAL EXAM  Physical Exam   Constitutional: She appears well-developed and well-nourished.   HENT:   Head:  Normocephalic.   Mouth/Throat: Oropharyngeal exudate present.   Eyes: Pupils are equal, round, and reactive to light.   Pulmonary/Chest: Effort normal.   Musculoskeletal: Normal range of motion.   Neurological: She is alert.   Psychiatric: She has a normal mood and affect.       Results for orders placed or performed in visit on 10/20/23   Urine Culture - Urine, Urine, Clean Catch    Specimen: Urine, Clean Catch   Result Value Ref Range    Urine Culture >100,000 CFU/mL Mixed Suki Isolated    Comprehensive Metabolic Panel    Specimen: Arm, Right; Blood   Result Value Ref Range    Glucose 105 (H) 65 - 99 mg/dL    BUN 13 6 - 20 mg/dL    Creatinine 0.80 0.57 - 1.00 mg/dL    Sodium 139 136 - 145 mmol/L    Potassium 4.2 3.5 - 5.2 mmol/L    Chloride 108 (H) 98 - 107 mmol/L    CO2 22.6 22.0 - 29.0 mmol/L    Calcium 9.3 8.6 - 10.5 mg/dL    Total Protein 6.9 6.0 - 8.5 g/dL    Albumin 4.3 3.5 - 5.2 g/dL    ALT (SGPT) 35 (H) 1 - 33 U/L    AST (SGOT) 24 1 - 32 U/L    Alkaline Phosphatase 70 39 - 117 U/L    Total Bilirubin 0.4 0.0 - 1.2 mg/dL    Globulin 2.6 gm/dL    A/G Ratio 1.7 g/dL    BUN/Creatinine Ratio 16.3 7.0 - 25.0    Anion Gap 8.4 5.0 - 15.0 mmol/L    eGFR 102.4 >60.0 mL/min/1.73   Lipase    Specimen: Arm, Right; Blood   Result Value Ref Range    Lipase 21 13 - 60 U/L   CBC Auto Differential    Specimen: Arm, Right; Blood   Result Value Ref Range    WBC 7.01 3.40 - 10.80 10*3/mm3    RBC 4.44 3.77 - 5.28 10*6/mm3    Hemoglobin 13.7 12.0 - 15.9 g/dL    Hematocrit 41.0 34.0 - 46.6 %    MCV 92.3 79.0 - 97.0 fL    MCH 30.9 26.6 - 33.0 pg    MCHC 33.4 31.5 - 35.7 g/dL    RDW 12.2 (L) 12.3 - 15.4 %    RDW-SD 40.8 37.0 - 54.0 fl    MPV 10.9 6.0 - 12.0 fL    Platelets 268 140 - 450 10*3/mm3    Neutrophil % 60.2 42.7 - 76.0 %    Lymphocyte % 28.7 19.6 - 45.3 %    Monocyte % 7.7 5.0 - 12.0 %    Eosinophil % 2.6 0.3 - 6.2 %    Basophil % 0.7 0.0 - 1.5 %    Immature Grans % 0.1 0.0 - 0.5 %    Neutrophils, Absolute 4.22 1.70 - 7.00  10*3/mm3    Lymphocytes, Absolute 2.01 0.70 - 3.10 10*3/mm3    Monocytes, Absolute 0.54 0.10 - 0.90 10*3/mm3    Eosinophils, Absolute 0.18 0.00 - 0.40 10*3/mm3    Basophils, Absolute 0.05 0.00 - 0.20 10*3/mm3    Immature Grans, Absolute 0.01 0.00 - 0.05 10*3/mm3    nRBC 0.0 0.0 - 0.2 /100 WBC   POC Pregnancy, Urine    Specimen: Urine   Result Value Ref Range    HCG, Urine, QL Negative Negative    Lot Number [id number]     Internal Positive Control Positive Positive, Passed    Internal Negative Control Negative Negative, Passed    Expiration Date 01-    POCT urinalysis dipstick, automated    Specimen: Urine   Result Value Ref Range    Color Yellow Yellow, Straw, Dark Yellow, Shara    Clarity, UA Slightly Cloudy (A) Clear    Specific Gravity  1.020 1.005 - 1.030    pH, Urine 6.0 5.0 - 8.0    Leukocytes Trace (A) Negative    Nitrite, UA Negative Negative    Protein, POC Negative Negative mg/dL    Glucose, UA Negative Negative mg/dL    Ketones, UA Negative Negative    Urobilinogen, UA 0.2 E.U./dL Normal, 0.2 E.U./dL    Bilirubin Negative Negative    Blood, UA Negative Negative    Lot Number [id number]     Expiration Date 01/29/24        Diagnoses and all orders for this visit:    1. Acute streptococcal pharyngitis (Primary)  -     azithromycin (Zithromax) 500 MG tablet; Take 1 tablet by mouth Daily for 5 days.  Dispense: 5 tablet; Refill: 0          FOLLOW-UP  As discussed during visit with PCP/[facility] if no improvement or Urgent Care/Emergency Department if worsening of symptoms    Patient verbalizes understanding of medication dosage, comfort measures, instructions for treatment and follow-up.    Ingrid Reza, ARAM  10/30/2023  06:51 EDT    The use of a video visit has been reviewed with the patient and verbal informed consent has been obtained. Myself and Leona Murillo participated in this visit. The patient is located in [address] [place] [address].    I am located in [place], [place].  Mychart and Twilio were utilized. I spent 10 minutes in the patient's chart for this visit.

## 2023-11-01 ENCOUNTER — OFFICE VISIT (OUTPATIENT)
Dept: FAMILY MEDICINE CLINIC | Facility: CLINIC | Age: 28
End: 2023-11-01
Payer: COMMERCIAL

## 2023-11-01 VITALS
HEIGHT: 63 IN | RESPIRATION RATE: 18 BRPM | OXYGEN SATURATION: 98 % | TEMPERATURE: 98.4 F | WEIGHT: 242.4 LBS | BODY MASS INDEX: 42.95 KG/M2 | HEART RATE: 80 BPM | SYSTOLIC BLOOD PRESSURE: 110 MMHG | DIASTOLIC BLOOD PRESSURE: 70 MMHG

## 2023-11-01 DIAGNOSIS — F41.9 ANXIETY AND DEPRESSION: Primary | ICD-10-CM

## 2023-11-01 DIAGNOSIS — F32.A ANXIETY AND DEPRESSION: Primary | ICD-10-CM

## 2023-11-01 DIAGNOSIS — R07.9 CHEST PAIN, UNSPECIFIED TYPE: ICD-10-CM

## 2023-11-01 RX ORDER — FLUOXETINE HYDROCHLORIDE 20 MG/1
20 CAPSULE ORAL DAILY
Qty: 30 CAPSULE | Refills: 3 | Status: SHIPPED | OUTPATIENT
Start: 2023-11-01

## 2023-11-01 NOTE — PROGRESS NOTES
"    Office Note     Name: Leona Murillo    : 1995     MRN: [id number]     Chief Complaint  Concern for anxiety    History of Present Illness:  Leona Murillo is a 28 y.o. female who presents today for concerns of stress.  She reports that she went to [facility] emergency department last week because she was having chest pain.  She reports that she was driving her car when she began feeling chest pain that went down her left arm into her back.  She reports that EKG, blood work and x-ray were all within normal limits. She reports that she was told at the emergency department that she was having an anxiety attack.  She does report that ER provider did recommend that she get a stress test.  She denies chest pain at present time, but does report that there is an \"irritated feeling \" in her chest.  She does report that when she feels very stressed, the chest pain occurs.  She does report having a lot of stress in her life.  She denies any SI/HI.  She is a mother of 4 children.  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  She reports that she has no support system, reports that her  is not able to take off work to help her frequently and other family does not live nearby.  She reports that she has not had a panic attack in over a year until recently.  She is doing therapy at present time.  She reports that she has never been on medication for anxiety or depression in the past.  She denies any chance she could be pregnant, had her last menstrual cycle last week.      Subjective     Review of Systems:   Review of Systems   Respiratory:  Negative for shortness of breath.    Cardiovascular:  Negative for chest pain and palpitations.   Psychiatric/Behavioral:  Negative for suicidal ideas. The patient is nervous/anxious.        I have reviewed the patients family history, social history, past medical history, past surgical history and have updated it as appropriate.     Past Medical " History:   Past Medical History:   Diagnosis Date    Endometriosis     Gestational diabetes     w/ first 3 pregnancy    Obesity     PCOS (polycystic ovarian syndrome)        Past Surgical History:   Past Surgical History:   Procedure Laterality Date     SECTION  2014    CHOLECYSTECTOMY  01/15/2018    ROOT CANAL  2023    tooth numbers 24, 25    TONSILLECTOMY         Family History:   Family History   Problem Relation Age of Onset    Diabetes Mother     Hypertension Mother     Migraines Mother     Neuropathy Mother     Arthritis Mother     Diabetes Father     Heart disease Father         chf    Heart attack Father     Heart disease Paternal Grandfather        Social History:   Social History     Socioeconomic History    Marital status:    Tobacco Use    Smoking status: Former     Packs/day: 0.25     Years: 2.00     Additional pack years: 0.00     Total pack years: 0.50     Types: Cigarettes     Start date:      Quit date: 2019     Years since quittin.5     Passive exposure: Past    Smokeless tobacco: Never   Vaping Use    Vaping Use: Never used   Substance and Sexual Activity    Alcohol use: Not Currently     Comment: Socially     Drug use: Yes     Types: Marijuana     Comment: reactional    Sexual activity: Yes     Partners: Male     Birth control/protection: None       Immunizations:   There is no immunization history on file for this patient.     Medications:     Current Outpatient Medications:     FLUoxetine (PROzac) 20 MG capsule, Take 1 capsule by mouth Daily., Disp: 30 capsule, Rfl: 3    Allergies:   Allergies   Allergen Reactions    Amoxicillin Hives    Cephalosporins Hives and Angioedema    Latex Anaphylaxis and Hives    Sulfa Antibiotics Hives    Erythromycin Hives     All mycin drugs    Ciprofloxacin Hives    Clindamycin/Lincomycin Hives, Nausea Only, Dizziness and Confusion    Pineapple Unknown - Low Severity       Objective     Vital Signs  Vitals:    23  "1600   BP: 110/70   BP Location: Left arm   Patient Position: Sitting   Cuff Size: Adult   Pulse: 80   Resp: 18   Temp: 98.4 °F (36.9 °C)   TempSrc: Temporal   SpO2: 98%   Weight: 110 kg (242 lb 6.4 oz)   Height: 160 cm (63\")     Estimated body mass index is 42.94 kg/m² as calculated from the following:    Height as of this encounter: 160 cm (63\").    Weight as of this encounter: 110 kg (242 lb 6.4 oz).            Physical Exam  Vitals and nursing note reviewed.   Constitutional:       General: She is not in acute distress.     Appearance: Normal appearance. She is not ill-appearing, toxic-appearing or diaphoretic.   HENT:      Head: Normocephalic and atraumatic.   Eyes:      Extraocular Movements: Extraocular movements intact.   Cardiovascular:      Rate and Rhythm: Normal rate and regular rhythm.      Heart sounds: No murmur heard.     No friction rub. No gallop.   Pulmonary:      Effort: Pulmonary effort is normal. No respiratory distress.      Breath sounds: No wheezing, rhonchi or rales.   Musculoskeletal:      Cervical back: Normal range of motion.   Skin:     Coloration: Skin is not pale.   Neurological:      Mental Status: She is alert and oriented to person, place, and time. Mental status is at baseline.   Psychiatric:         Mood and Affect: Mood normal.         Thought Content: Thought content normal.          Assessment and Plan     1. Anxiety and depression  -PHQ-9 Total Score: 20  -BARBER 7 Total Score: 21  -Patient denies SI/HI.  -We did discuss I believe the patient would be a good candidate for SSRI.  We did discuss mechanism of action of SSRIs.  We did discuss possible side effects of medication and risks and benefits of taking medication.  -We did discuss most common side effects of sexual dysfunction, headaches, lightheadedness, GI upset while getting adjusted to the medication.  If they have any intolerance, they have been encouraged to call or return to care sooner than follow-up " appointment.  -We did discuss that SSRIs can worsen symptoms of anxiety or depression initially.  -We also discussed blackbox warning of suicidal thoughts in teens and young adults.  If patient has any suicidal thoughts or intent, they have been advised to return to care or seek emergent care immediately.  -Patient does not wish to start contraception.  We did discuss that there is some potential risk to fetus if she were to become pregnant.  She verbalizes understanding of this risk and would like to move forward with prescription of medication.  -We did discuss it can take 4 to 6 weeks for medication to reach full efficacy.  -We will reassess how the patient is doing in 1 month.  -If symptoms worsen or new symptoms develop prior to that time or if there is any intolerance to medication, they have been encouraged to return to care sooner.  - FLUoxetine (PROzac) 20 MG capsule; Take 1 capsule by mouth Daily.  Dispense: 30 capsule; Refill: 3    2. Chest pain, unspecified type  -  ECG 12 Lead    Date/Time: 11/1/2023 5:27 PM  Performed by: Kely Salguero PA-C    Authorized by: Kely Salguero PA-C  Previous ECG: no previous ECG available  Rate: normal  BPM: 65  Comments: ECG interpreted by myself and my supervising physician, Dr. Vazquez.  Scanned ECG will be in chart.      -ECG obtained in the office does not reveal any acute ST changes at present time.  She denies chest pain at present time.  -We did review symptoms of MI.  We did discuss that the safest option for any patient who has chest pain is to be evaluated at the emergency department.  -She does admit to experiencing chest pain when she feels anxious or stressed.  The chest pain she has experienced could be a manifestation of anxiety.  -Cardiac stress testing will also be ordered for further evaluation.         Follow Up  Return in about 1 month (around 12/1/2023) for Recheck.    Jessy Salguero PA-C  [facility]

## 2023-11-06 DIAGNOSIS — J30.9 ALLERGIC RHINITIS, UNSPECIFIED SEASONALITY, UNSPECIFIED TRIGGER: Primary | ICD-10-CM

## 2023-11-06 RX ORDER — LORATADINE 10 MG/1
10 TABLET ORAL DAILY
Qty: 30 TABLET | Refills: 2 | Status: SHIPPED | OUTPATIENT
Start: 2023-11-06

## 2023-11-06 RX ORDER — FLUTICASONE PROPIONATE 50 MCG
2 SPRAY, SUSPENSION (ML) NASAL DAILY
Qty: 11.1 ML | Refills: 0 | Status: SHIPPED | OUTPATIENT
Start: 2023-11-06

## 2023-11-27 ENCOUNTER — OFFICE VISIT (OUTPATIENT)
Dept: FAMILY MEDICINE CLINIC | Facility: CLINIC | Age: 28
End: 2023-11-27
Payer: COMMERCIAL

## 2023-11-27 ENCOUNTER — LAB (OUTPATIENT)
Dept: FAMILY MEDICINE CLINIC | Facility: CLINIC | Age: 28
End: 2023-11-27
Payer: COMMERCIAL

## 2023-11-27 VITALS
WEIGHT: 254 LBS | BODY MASS INDEX: 45 KG/M2 | TEMPERATURE: 98 F | OXYGEN SATURATION: 97 % | HEART RATE: 78 BPM | DIASTOLIC BLOOD PRESSURE: 76 MMHG | RESPIRATION RATE: 18 BRPM | SYSTOLIC BLOOD PRESSURE: 118 MMHG | HEIGHT: 63 IN

## 2023-11-27 DIAGNOSIS — R74.8 ELEVATED LIVER ENZYMES: ICD-10-CM

## 2023-11-27 DIAGNOSIS — F32.A ANXIETY AND DEPRESSION: Primary | ICD-10-CM

## 2023-11-27 DIAGNOSIS — F41.9 ANXIETY AND DEPRESSION: Primary | ICD-10-CM

## 2023-11-27 DIAGNOSIS — R22.9 LOCALIZED SKIN MASS, LUMP, OR SWELLING: ICD-10-CM

## 2023-11-27 LAB
ALBUMIN SERPL-MCNC: 4.7 G/DL (ref 3.5–5.2)
ALBUMIN/GLOB SERPL: 2 G/DL
ALP SERPL-CCNC: 81 U/L (ref 39–117)
ALT SERPL W P-5'-P-CCNC: 36 U/L (ref 1–33)
ANION GAP SERPL CALCULATED.3IONS-SCNC: 10 MMOL/L (ref 5–15)
AST SERPL-CCNC: 21 U/L (ref 1–32)
BILIRUB SERPL-MCNC: 0.2 MG/DL (ref 0–1.2)
BUN SERPL-MCNC: 16 MG/DL (ref 6–20)
BUN/CREAT SERPL: 20.8 (ref 7–25)
CALCIUM SPEC-SCNC: 10.1 MG/DL (ref 8.6–10.5)
CHLORIDE SERPL-SCNC: 105 MMOL/L (ref 98–107)
CO2 SERPL-SCNC: 26 MMOL/L (ref 22–29)
CREAT SERPL-MCNC: 0.77 MG/DL (ref 0.57–1)
EGFRCR SERPLBLD CKD-EPI 2021: 107.2 ML/MIN/1.73
GLOBULIN UR ELPH-MCNC: 2.4 GM/DL
GLUCOSE SERPL-MCNC: 99 MG/DL (ref 65–99)
POTASSIUM SERPL-SCNC: 4.7 MMOL/L (ref 3.5–5.2)
PROT SERPL-MCNC: 7.1 G/DL (ref 6–8.5)
SODIUM SERPL-SCNC: 141 MMOL/L (ref 136–145)

## 2023-11-27 PROCEDURE — 80053 COMPREHEN METABOLIC PANEL: CPT

## 2023-11-27 RX ORDER — DESVENLAFAXINE SUCCINATE 50 MG/1
50 TABLET, EXTENDED RELEASE ORAL DAILY
Qty: 30 TABLET | Refills: 3 | Status: SHIPPED | OUTPATIENT
Start: 2023-11-27

## 2023-11-27 NOTE — PROGRESS NOTES
"    Office Note     Name: Leona Murillo    : 1995     MRN: [id number]     Chief Complaint  Anxiety and depression concerns    History of Present Illness:  Leona Murillo is a 28 y.o. female who presents today for anxiety and depression concerns.  She was prescribed Prozac on 2023 for anxiety.  She has not yet started this medication, reports that she was told by her allergist that her Claritin and Flonase may help her anxiety symptoms.  She reports that she has been taking Claritin and Flonase and these have not helped her anxiety symptoms.  She has not yet done amoxicillin oral challenge, which she reports that she was was to be started on by allergist.  They are considering allergy injections as well.  She reports that 5 days ago, she was washing dishes when she had what she calls a possibly intrusive thought about getting the gun in her home.  She reports that this thought scared her, and she wanted to be far away from the gun in her home.  She reports that she drove to the dollar store to get out of the house.  She reports that she called the suicide hotline who encouraged her to \"get away\" for a few days.  She reports that she spent several days at her grandmother's house.  She denies ever having any suicidal thoughts in the past.  She denies ever having suicide attempt or history of self-harm behaviors.  She denies any suicidal or homicidal thoughts at present time.  She denies any plan for suicide at present time.  She denies any desire to end her life or to harm anyone else.  She denies any visual or auditory hallucinations.  She is doing virtual counseling, once per week.  She has been doing virtual counseling for around 1 year.  She does report that she started her menstrual cycle the day before she had this thought and she reports that her mood always worsens during her cycle.  She has not tolerated contraception well in the past and has no desire for contraception at present time.  She " "does report that she had GeneSight testing done at previous PCP office and we will try to obtain copy of this.    She also reports concerns of a \"knot\" on her right forehead.  She reports it has been there since August.  She reports it is a little tender to touch at times.  She had no redness to the area, no drainage, no wound.  It has not enlarged in size or changed since she noticed it.        Subjective     Review of Systems:   Review of Systems   Constitutional:  Negative for chills and fever.   Skin:  Negative for rash and wound.   Psychiatric/Behavioral:  Positive for depressed mood. Negative for self-injury and suicidal ideas. The patient is nervous/anxious.        I have reviewed the patients family history, social history, past medical history, past surgical history and have updated it as appropriate.     Past Medical History:   Past Medical History:   Diagnosis Date    Endometriosis     Gestational diabetes     w/ first 3 pregnancy    Obesity     PCOS (polycystic ovarian syndrome)        Past Surgical History:   Past Surgical History:   Procedure Laterality Date     SECTION  2014    CHOLECYSTECTOMY  01/15/2018    ROOT CANAL  2023    tooth numbers 24, 25    TONSILLECTOMY         Family History:   Family History   Problem Relation Age of Onset    Diabetes Mother     Hypertension Mother     Migraines Mother     Neuropathy Mother     Arthritis Mother     Diabetes Father     Heart disease Father         chf    Heart attack Father     Heart disease Paternal Grandfather        Social History:   Social History     Socioeconomic History    Marital status:    Tobacco Use    Smoking status: Former     Packs/day: 0.25     Years: 2.00     Additional pack years: 0.00     Total pack years: 0.50     Types: Cigarettes     Start date:      Quit date: 2019     Years since quittin.6     Passive exposure: Past    Smokeless tobacco: Never   Vaping Use    Vaping Use: Never used " "  Substance and Sexual Activity    Alcohol use: Not Currently     Comment: Socially     Drug use: Yes     Types: Marijuana     Comment: reactional    Sexual activity: Yes     Partners: Male     Birth control/protection: None       Immunizations:   There is no immunization history on file for this patient.     Medications:     Current Outpatient Medications:     fluticasone (FLONASE) 50 MCG/ACT nasal spray, 2 sprays into the nostril(s) as directed by provider Daily., Disp: 11.1 mL, Rfl: 0    loratadine (Claritin) 10 MG tablet, Take 1 tablet by mouth Daily., Disp: 30 tablet, Rfl: 2    desvenlafaxine (Pristiq) 50 MG 24 hr tablet, Take 1 tablet by mouth Daily., Disp: 30 tablet, Rfl: 3    Allergies:   Allergies   Allergen Reactions    Amoxicillin Hives    Cephalosporins Hives and Angioedema    Latex Anaphylaxis and Hives    Sulfa Antibiotics Hives    Erythromycin Hives     All mycin drugs    Ciprofloxacin Hives    Clindamycin/Lincomycin Hives, Nausea Only, Dizziness and Confusion    Pineapple Unknown - Low Severity       Objective     Vital Signs  Vitals:    11/27/23 0957   BP: 118/76   BP Location: Left arm   Patient Position: Sitting   Cuff Size: Adult   Pulse: 78   Resp: 18   Temp: 98 °F (36.7 °C)   TempSrc: Temporal   SpO2: 97%   Weight: 115 kg (254 lb)   Height: 160 cm (62.99\")     Estimated body mass index is 45.01 kg/m² as calculated from the following:    Height as of this encounter: 160 cm (62.99\").    Weight as of this encounter: 115 kg (254 lb).            Physical Exam  Vitals and nursing note reviewed.   Constitutional:       General: She is not in acute distress.     Appearance: Normal appearance. She is not ill-appearing, toxic-appearing or diaphoretic.   HENT:      Head: Normocephalic and atraumatic.     Eyes:      Extraocular Movements: Extraocular movements intact.   Cardiovascular:      Rate and Rhythm: Normal rate and regular rhythm.      Heart sounds: No murmur heard.     No friction rub. No gallop. "   Pulmonary:      Effort: Pulmonary effort is normal. No respiratory distress.      Breath sounds: No wheezing, rhonchi or rales.   Musculoskeletal:      Cervical back: Normal range of motion.   Lymphadenopathy:      Cervical: No cervical adenopathy.   Skin:     Coloration: Skin is not pale.   Neurological:      Mental Status: She is alert and oriented to person, place, and time. Mental status is at baseline.   Psychiatric:         Mood and Affect: Mood normal.         Speech: Speech normal. Speech is not rapid and pressured or slurred.         Behavior: Behavior normal. Behavior is cooperative.         Thought Content: Thought content normal. Thought content does not include homicidal or suicidal ideation.         Judgment: Judgment normal. Judgment is not impulsive or inappropriate.          Assessment and Plan     1. Anxiety and depression  -Patient denies SI/HI.  She denies any plan for suicide or harm to anyone else.  -We did discuss I believe the patient would be a good candidate for SSRI/SNRI.  We did discuss mechanism of action of SSRIs.  We did discuss possible side effects of medication and risks and benefits of taking medication.  -We were able to locate her PLUQ testing results and Pristiq appears to be a recommended medication.  -We did discuss that if she were to become pregnant on this medication, there are not long-term studies that show its safety to fetus, so this is a possible risk.  She would need to discuss with gynecology provider if this were to happen.  She does not have any desire for contraception at present time and verbalized understanding of these risks.  -We did discuss most common side effects of sexual dysfunction, headaches, lightheadedness, GI upset while getting adjusted to the medication.  If they have any intolerance, they have been encouraged to call or return to care sooner than follow-up appointment.  -We did discuss that SSRIs can worsen symptoms of anxiety or depression  initially.  -We also discussed blackbox warning of suicidal thoughts in teens and young adults.  If patient has any suicidal thoughts or intent, they have been advised to seek emergent care.  -Did discuss removing weapons from her home and any means of suicide.  -Please continue with counseling.  -We did discuss it can take 4 to 6 weeks for medication to reach full efficacy.  -We will reassess how the patient is doing in 1 month.  -If symptoms worsen or new symptoms develop prior to that time or if there is any intolerance to medication, they have been encouraged to return to care sooner.  - desvenlafaxine (Pristiq) 50 MG 24 hr tablet; Take 1 tablet by mouth Daily.  Dispense: 30 tablet; Refill: 3    2. Localized skin mass, lump, or swelling  -Ultrasound has been ordered for further evaluation.  -Please keep an eye on this area and if area enlarges or changes in nature, please return to care sooner.  - US Head Neck Soft Tissue; Future    3. Elevated liver enzymes  -Repeat CMP has been ordered due to history of elevated liver enzymes.  Please avoid alcohol and excessive Tylenol use.  If CMP does continue to remain elevated, may need to order liver ultrasound to evaluate for possible fatty liver disease.  - Comprehensive Metabolic Panel; Future     I spent 48 minutes caring for Leona Murillo on this date of service. This time includes time spent by me in the following activities: preparing for the visit, reviewing tests, performing a medically appropriate examination and/or evaluation, counseling and educating the patient/family/caregiver, ordering medications, tests, or procedures and documenting information in the medical record.    Follow Up  Return in about 4 weeks (around 12/25/2023) for Recheck.    Jessy Salguero PA-C  [facility]

## 2023-11-28 ENCOUNTER — TELEPHONE (OUTPATIENT)
Dept: FAMILY MEDICINE CLINIC | Facility: CLINIC | Age: 28
End: 2023-11-28
Payer: COMMERCIAL

## 2023-11-28 NOTE — TELEPHONE ENCOUNTER
Pt called stated that she has been having shaking all over and feeling nauseated since taking her pristiq. States that she is due for another dose at 12. Spoke with dr del rosario and he states for the her to stop the medication all together and to be seen to been seen this week for a fu appt

## 2023-11-28 NOTE — TELEPHONE ENCOUNTER
"I did call this patient after her last [employer] message about inpatient treatment. She said she took one dose of Pristiq, said she felt like she \"felt numb and didn't feel her emotions and felt shaky.\" She denies any thoughts of self harm or harming anyone else. She just reports that she is nervous about how she is feeling and wants to be evaluated by a psychiatric facility. We discussed that she can receive 24/7 evaluation at the emergency department, [facility] in [place], or [facility] [place].  I advised that if she develops any suicidal or homicidal thoughts, that she be evaluated immediately.  We discussed that they would be able to evaluate her for possible admission, if needed.  Again, the patient was asked if she was having any self-harm thoughts or thoughts of harming anyone else, and she denies both.  Patient does report that she will likely go be evaluated at [facility] today and I am going to [employer] message her this address.  "

## 2023-12-04 ENCOUNTER — READMISSION MANAGEMENT (OUTPATIENT)
Dept: CALL CENTER | Facility: HOSPITAL | Age: 28
End: 2023-12-04
Payer: COMMERCIAL

## 2023-12-04 DIAGNOSIS — R74.8 ELEVATED LIVER ENZYMES: Primary | ICD-10-CM

## 2023-12-04 NOTE — OUTREACH NOTE
Prep Survey      Flowsheet Row Responses   [facility] facility patient discharged from? Non-BH   Is LACE score < 7 ? Non-BH Discharge   Eligibility [facility]   Date of Admission 11/28/23   Date of Discharge 12/01/23   Discharge Disposition Home or Self Care   Discharge diagnosis Suicidal ideations   Does the patient have one of the following disease processes/diagnoses(primary or secondary)? Other   Prep survey completed? Yes            Bertha WARD - Registered Nurse

## 2023-12-05 ENCOUNTER — TRANSITIONAL CARE MANAGEMENT TELEPHONE ENCOUNTER (OUTPATIENT)
Dept: CALL CENTER | Facility: HOSPITAL | Age: 28
End: 2023-12-05
Payer: COMMERCIAL

## 2023-12-05 NOTE — OUTREACH NOTE
Call Center TCM Note      Flowsheet Row Responses   [facility] patient [facility]? [facility]  [facility]   Does the patient have one of the following disease processes/diagnoses(primary or secondary)? Other   TCM attempt successful? Yes   Call start time 1001   Call end time 1009   Discharge diagnosis Suicidal ideations   Person spoke with today (if not patient) and relationship Patient   Meds reviewed with patient/caregiver? Yes   Is the patient having any side effects they believe may be caused by any medication additions or changes? Yes   Side effects comments  Patient reports that she feels shaky   Does the patient have all medications ordered at discharge? Yes   Is the patient taking all medications as directed (includes completed medication regime)? Yes   Comments PCP Kely PITTS. Patient scheduled for a Hospital follow up appt for tomorrow with Patience PITTS 12/6  1130am   Does the patient have an appointment with their PCP within 7-14 days of discharge? No   Nursing Interventions Assisted patient with making appointment per protocol, Routed TCM call to PCP office   Has home health visited the patient within 72 hours of discharge? N/A   Psychosocial issues? Yes   Psychosocial comments Patient has [facility] appt in place for Friday 12/8. Patient verbalizes awareness to present to [facility] or contact suicide hotline with feelings of self harm.   Did the patient receive a copy of their discharge instructions? Yes   Nursing interventions Reviewed instructions with patient   What is the patient's perception of their health status since discharge? Same  [Patient reports continued feelings of deep depression. Patient reports that she may present back to [facility] today.]   Is the patient/caregiver able to teach back signs and symptoms related to disease process for when to call PCP? Yes   Is the patient/caregiver able to teach back the hierarchy of who to call/visit for  symptoms/problems? PCP, Specialist, Home health nurse, Urgent Care, ED, 911 Yes   If the patient is a current smoker, are they able to teach back resources for cessation? Not a smoker   TCM call completed? Yes   Call end time 1009   Would this patient benefit from a Referral to [facility] Social Work? No   Is the patient interested in additional calls from an ambulatory ? No            Mary Huynh RN    12/5/2023, 10:10 EST

## 2023-12-06 ENCOUNTER — TELEMEDICINE (OUTPATIENT)
Dept: FAMILY MEDICINE CLINIC | Facility: CLINIC | Age: 28
End: 2023-12-06
Payer: COMMERCIAL

## 2023-12-06 DIAGNOSIS — F32.A DEPRESSION WITH SUICIDAL IDEATION: Primary | ICD-10-CM

## 2023-12-06 DIAGNOSIS — R45.851 DEPRESSION WITH SUICIDAL IDEATION: Primary | ICD-10-CM

## 2023-12-06 PROCEDURE — 99213 OFFICE O/P EST LOW 20 MIN: CPT | Performed by: PHYSICIAN ASSISTANT

## 2023-12-27 ENCOUNTER — TELEMEDICINE (OUTPATIENT)
Dept: FAMILY MEDICINE CLINIC | Facility: CLINIC | Age: 28
End: 2023-12-27
Payer: COMMERCIAL

## 2023-12-27 DIAGNOSIS — F32.1 CURRENT MODERATE EPISODE OF MAJOR DEPRESSIVE DISORDER, UNSPECIFIED WHETHER RECURRENT: Primary | ICD-10-CM

## 2023-12-27 PROCEDURE — 99214 OFFICE O/P EST MOD 30 MIN: CPT | Performed by: PHYSICIAN ASSISTANT

## 2023-12-27 RX ORDER — FLUOXETINE HYDROCHLORIDE 20 MG/1
20 CAPSULE ORAL DAILY
Qty: 30 CAPSULE | Refills: 5 | Status: SHIPPED | OUTPATIENT
Start: 2023-12-27

## 2023-12-27 NOTE — PROGRESS NOTES
Telehealth E-Visit      Date: 2023   Patient Name: Leona Murillo  : 1995   MRN: [id number]     Chief Complaint:  No chief complaint on file.      I have reviewed the E-Visit questionnaire and the patient's answers, my assessment and plan are listed below.     This provider is located at the [facility] (through [facility]), [address] [place] [address]. [id number] a secure [email or web address] Video Visit through [employer]. Patient is being seen remotely via telehealth at their home address in [place], and stated they are in a secure environment for this session. The patient's condition being diagnosed/treated is appropriate for telemedicine. The provider identified herself as well as her credentials. The patient, and/or patients guardian, consent to be seen remotely, and when consent is given they understand that the consent allows for patient identifiable information to be sent to a third party as needed. They may refuse to be seen remotely at any time. The electronic data is encrypted and password protected, and the patient and/or guardian has been advised of the potential risks to privacy not withstanding such measures.    You have chosen to receive care through a telehealth visit. Do you consent to use a video/audio connection for your medical care today? Yes    History of Present Illness: Leona Murillo is a 28 y.o. female who is here today to follow up with follow-up of her depression symptoms.  She reports that she is doing better although she still continues to feel little bit stress in the evenings causing her to have a little bit of tremor.  She currently only has 1 vehicle that her  has to drive to work and that is kept her somewhat restraint and what she can do for her activity but she is trying to get out more.  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  She feels more  control the situation and does not want to change her medications at this time.  She denies suicidal ideation today      Subjective      Review of Systems:   Review of Systems   Constitutional: Negative.    HENT: Negative.     Respiratory: Negative.     Cardiovascular: Negative.    Psychiatric/Behavioral:  Positive for agitation, depressed mood and stress. Negative for self-injury, sleep disturbance, suicidal ideas and negative for hyperactivity.      I have reviewed and the following portions of the patient's history were updated as appropriate: past family history, past medical history, past social history, past surgical history and problem list.    Medications:     Current Outpatient Medications:     FLUoxetine (PROzac) 20 MG capsule, Take 1 capsule by mouth Daily., Disp: 30 capsule, Rfl: 5    fluticasone (FLONASE) 50 MCG/ACT nasal spray, 2 sprays into the nostril(s) as directed by provider Daily., Disp: 11.1 mL, Rfl: 0    loratadine (Claritin) 10 MG tablet, Take 1 tablet by mouth Daily., Disp: 30 tablet, Rfl: 2    Allergies:   Allergies   Allergen Reactions    Amoxicillin Hives    Cephalosporins Hives and Angioedema    Latex Anaphylaxis and Hives    Sulfa Antibiotics Hives    Erythromycin Hives     All mycin drugs    Ciprofloxacin Hives    Clindamycin/Lincomycin Hives, Nausea Only, Dizziness and Confusion    Pineapple Unknown - Low Severity       Objective     Physical Exam:  Vital Signs: There were no vitals filed for this visit.  There is no height or weight on file to calculate BMI.    Physical Exam      Assessment / Plan      Assessment/Plan:   1. Current moderate episode of major depressive disorder, unspecified whether recurrent  I have encouraged increased use of resources to help her with educating her children as well as increased time on her own.  We will continue her medication and follow  - FLUoxetine (PROzac) 20 MG capsule; Take 1 capsule by mouth Daily.  Dispense: 30 capsule; Refill: 5      Follow  Up:   No follow-ups on file.    20 minutes were spent reviewing the patient's questionnaire, formulating a treatment plan, and relaying information to the patient via K[employer].    DENISE Dior [facility]  12/27/23  11:55 EST

## 2024-03-18 RX ORDER — HYDROXYZINE PAMOATE 25 MG/1
CAPSULE ORAL
COMMUNITY
Start: 2023-12-01

## 2024-03-20 ENCOUNTER — TRANSCRIBE ORDERS (OUTPATIENT)
Dept: LAB | Facility: HOSPITAL | Age: 29
End: 2024-03-20
Payer: COMMERCIAL

## 2024-03-20 ENCOUNTER — LAB (OUTPATIENT)
Dept: LAB | Facility: HOSPITAL | Age: 29
End: 2024-03-20
Payer: COMMERCIAL

## 2024-03-20 DIAGNOSIS — N92.6 IRREGULAR MENSTRUAL CYCLE: Primary | ICD-10-CM

## 2024-03-20 LAB — HCG INTACT+B SERPL-ACNC: 270.9 MIU/ML

## 2024-03-20 PROCEDURE — 84702 CHORIONIC GONADOTROPIN TEST: CPT | Performed by: NURSE PRACTITIONER

## 2024-03-20 PROCEDURE — 36415 COLL VENOUS BLD VENIPUNCTURE: CPT | Performed by: NURSE PRACTITIONER

## 2024-03-22 ENCOUNTER — LAB (OUTPATIENT)
Dept: FAMILY MEDICINE CLINIC | Facility: CLINIC | Age: 29
End: 2024-03-22
Payer: COMMERCIAL

## 2024-03-22 DIAGNOSIS — N92.6 PERIOD HAS COME LATE: Primary | ICD-10-CM

## 2024-03-22 PROCEDURE — 36415 COLL VENOUS BLD VENIPUNCTURE: CPT | Performed by: FAMILY MEDICINE

## 2024-03-22 PROCEDURE — 84702 CHORIONIC GONADOTROPIN TEST: CPT | Performed by: NURSE PRACTITIONER

## 2024-03-24 LAB — HCG INTACT+B SERPL-ACNC: 573 MIU/ML

## 2024-03-26 DIAGNOSIS — F32.1 CURRENT MODERATE EPISODE OF MAJOR DEPRESSIVE DISORDER, UNSPECIFIED WHETHER RECURRENT: ICD-10-CM

## 2024-03-26 RX ORDER — FLUOXETINE HYDROCHLORIDE 20 MG/1
20 CAPSULE ORAL DAILY
Qty: 30 CAPSULE | Refills: 5 | Status: CANCELLED | OUTPATIENT
Start: 2024-03-26

## 2024-03-26 NOTE — TELEPHONE ENCOUNTER
Attempted to reach pt, no answer.     HUB TO RELAY:   We received a refill request for prozac wanted to see if pt still wanted this medication refilled

## 2024-04-05 ENCOUNTER — PATIENT MESSAGE (OUTPATIENT)
Dept: FAMILY MEDICINE CLINIC | Facility: CLINIC | Age: 29
End: 2024-04-05
Payer: COMMERCIAL

## 2024-04-09 ENCOUNTER — TELEPHONE (OUTPATIENT)
Dept: FAMILY MEDICINE CLINIC | Facility: CLINIC | Age: 29
End: 2024-04-09

## 2024-04-09 NOTE — TELEPHONE ENCOUNTER
I spoke to patient and told her Jessy would like for her to have her OB take her off her Prozac. Her OB is Kena Polo with [facility]. Patient was agreeable to contact her OB doctor and cancel todays visit with Jessy.

## 2024-05-01 ENCOUNTER — OFFICE VISIT (OUTPATIENT)
Dept: FAMILY MEDICINE CLINIC | Facility: CLINIC | Age: 29
End: 2024-05-01
Payer: COMMERCIAL

## 2024-05-01 VITALS
HEART RATE: 84 BPM | SYSTOLIC BLOOD PRESSURE: 118 MMHG | BODY MASS INDEX: 44.76 KG/M2 | TEMPERATURE: 97.8 F | HEIGHT: 63 IN | WEIGHT: 252.6 LBS | DIASTOLIC BLOOD PRESSURE: 72 MMHG | OXYGEN SATURATION: 97 %

## 2024-05-01 DIAGNOSIS — M79.672 LEFT FOOT PAIN: Primary | ICD-10-CM

## 2024-05-01 PROCEDURE — 99213 OFFICE O/P EST LOW 20 MIN: CPT | Performed by: PHYSICIAN ASSISTANT

## 2024-05-01 NOTE — PROGRESS NOTES
Follow Up Office Visit      Date: 2024   Patient Name: Leona Murillo  : 1995   MRN: [id number]     Chief Complaint:    Chief Complaint   Patient presents with   • Follow-up     L foot hurts, stepped on a toy a few weeks ago       History of Present Illness: Leona Murillo is a 28 y.o. female who is here today for 2 weeks of left dorsum of the foot pain after stepping on a matchbox car in her house.  At the time of the initial injury she had pain but did not have swelling or bruising and she has had on and off pain since that time that seems to be getting worse.  She is currently 11 weeks pregnant and thought that maybe she was just having some changes due to laxity of tendons.  Can use pain is becoming more more persistent.  She still does not experience any swelling she frequently goes barefoot and also has the tendency to wear flip-flops..    Subjective      Review of Systems:   Review of Systems   Constitutional: Negative.    HENT: Negative.     Respiratory: Negative.     Cardiovascular: Negative.    Musculoskeletal:  Positive for gait problem (Due to forefoot pain). Negative for joint swelling, myalgias and neck pain.       I have reviewed the patients family history, social history, past medical history, past surgical history and have updated it as appropriate.     Medications:     Current Outpatient Medications:   •  FLUoxetine (PROzac) 20 MG capsule, Take 1 capsule by mouth Daily., Disp: 30 capsule, Rfl: 5  •  fluticasone (FLONASE) 50 MCG/ACT nasal spray, 2 sprays into the nostril(s) as directed by provider Daily., Disp: 11.1 mL, Rfl: 0  •  hydrOXYzine pamoate (VISTARIL) 25 MG capsule, , Disp: , Rfl:   •  loratadine (Claritin) 10 MG tablet, Take 1 tablet by mouth Daily., Disp: 30 tablet, Rfl: 2    Allergies:   Allergies   Allergen Reactions   • Adhesive Tape Anaphylaxis   • Amoxicillin Hives   • Banana Anaphylaxis   • Cephalosporins Angioedema, Hives and Unknown - Low Severity   • Latex  "Anaphylaxis and Hives   • Sulfa Antibiotics Hives and Unknown - Low Severity   • Erythromycin Hives     All mycin drugs   • Ciprofloxacin Hives   • Clindamycin/Lincomycin Hives, Nausea Only, Dizziness and Confusion   • Pineapple Unknown - Low Severity       Objective     Physical Exam: Please see above  Vital Signs:   Vitals:    05/01/24 1106   BP: 118/72   Pulse: 84   Temp: 97.8 °F (36.6 °C)   SpO2: 97%   Weight: 115 kg (252 lb 9.6 oz)   Height: 160 cm (62.99\")     Body mass index is 44.76 kg/m².    Physical Exam  Vitals and nursing note reviewed.   Musculoskeletal:      Left foot: Normal range of motion. No deformity or Charcot foot.        Feet:    Feet:      Comments: Point specific pain above the fifth metatarsal  Neurological:      General: No focal deficit present.      Mental Status: She is oriented to person, place, and time.      Cranial Nerves: No cranial nerve deficit.      Motor: No weakness.      Gait: Gait normal.       Procedures    Assessment / Plan      Assessment/Plan:   1. Left foot pain  In light of the patient's first trimester pregnancy and we will treat this as a stress fracture and immobilize foot for 2 weeks.  She has this and instructed to wear this anytime she is on her feet and follow-up in 2 weeks if she has had no improvement or symptomatology  - Post-Op Shoe       Follow Up:   No follow-ups on file.      At [facility] [place] [facility], we believe that sharing information builds trust and better relationships. You are receiving this note because you recently visited [facility] [place] [facility]. It is possible you will see health information before a provider has talked with you about it. This kind of information can be easy to misunderstand. To help you fully understand what it means for your health, we urge you to discuss this note with your provider.    Patience Vazquez PA-C  [facility]  "

## 2024-05-13 ENCOUNTER — TRANSCRIBE ORDERS (OUTPATIENT)
Dept: LAB | Facility: HOSPITAL | Age: 29
End: 2024-05-13
Payer: COMMERCIAL

## 2024-05-13 ENCOUNTER — LAB (OUTPATIENT)
Dept: LAB | Facility: HOSPITAL | Age: 29
End: 2024-05-13
Payer: COMMERCIAL

## 2024-05-13 ENCOUNTER — LAB (OUTPATIENT)
Dept: FAMILY MEDICINE CLINIC | Facility: CLINIC | Age: 29
End: 2024-05-13
Payer: COMMERCIAL

## 2024-05-13 DIAGNOSIS — Z3A.12 12 WEEKS GESTATION OF PREGNANCY: Primary | ICD-10-CM

## 2024-05-13 DIAGNOSIS — Z34.91 FIRST TRIMESTER PREGNANCY: ICD-10-CM

## 2024-05-13 DIAGNOSIS — Z34.91 FIRST TRIMESTER PREGNANCY: Primary | ICD-10-CM

## 2024-05-13 DIAGNOSIS — Z3A.12 12 WEEKS GESTATION OF PREGNANCY: ICD-10-CM

## 2024-05-13 LAB
ABO GROUP BLD: NORMAL
AMPHET+METHAMPHET UR QL: NEGATIVE
AMPHETAMINES UR QL: NEGATIVE
BARBITURATES UR QL SCN: NEGATIVE
BASOPHILS # BLD AUTO: 0.04 10*3/MM3 (ref 0–0.2)
BASOPHILS NFR BLD AUTO: 0.4 % (ref 0–1.5)
BENZODIAZ UR QL SCN: NEGATIVE
BUPRENORPHINE SERPL-MCNC: NEGATIVE NG/ML
CANNABINOIDS SERPL QL: NEGATIVE
COCAINE UR QL: NEGATIVE
DEPRECATED RDW RBC AUTO: 40.1 FL (ref 37–54)
EOSINOPHIL # BLD AUTO: 0.47 10*3/MM3 (ref 0–0.4)
EOSINOPHIL NFR BLD AUTO: 4.4 % (ref 0.3–6.2)
ERYTHROCYTE [DISTWIDTH] IN BLOOD BY AUTOMATED COUNT: 12.1 % (ref 12.3–15.4)
FENTANYL UR-MCNC: NEGATIVE NG/ML
HBA1C MFR BLD: 5.3 % (ref 4.8–5.6)
HCT VFR BLD AUTO: 36.4 % (ref 34–46.6)
HGB BLD-MCNC: 12.2 G/DL (ref 12–15.9)
HOLD SPECIMEN: NORMAL
IMM GRANULOCYTES # BLD AUTO: 0.06 10*3/MM3 (ref 0–0.05)
IMM GRANULOCYTES NFR BLD AUTO: 0.6 % (ref 0–0.5)
LYMPHOCYTES # BLD AUTO: 2.12 10*3/MM3 (ref 0.7–3.1)
LYMPHOCYTES NFR BLD AUTO: 19.6 % (ref 19.6–45.3)
MCH RBC QN AUTO: 30.6 PG (ref 26.6–33)
MCHC RBC AUTO-ENTMCNC: 33.5 G/DL (ref 31.5–35.7)
MCV RBC AUTO: 91.2 FL (ref 79–97)
METHADONE UR QL SCN: NEGATIVE
MONOCYTES # BLD AUTO: 0.72 10*3/MM3 (ref 0.1–0.9)
MONOCYTES NFR BLD AUTO: 6.7 % (ref 5–12)
NEUTROPHILS NFR BLD AUTO: 68.3 % (ref 42.7–76)
NEUTROPHILS NFR BLD AUTO: 7.38 10*3/MM3 (ref 1.7–7)
NRBC BLD AUTO-RTO: 0 /100 WBC (ref 0–0.2)
OPIATES UR QL: NEGATIVE
OXYCODONE UR QL SCN: NEGATIVE
PCP UR QL SCN: NEGATIVE
PLATELET # BLD AUTO: 249 10*3/MM3 (ref 140–450)
PMV BLD AUTO: 10.3 FL (ref 6–12)
RBC # BLD AUTO: 3.99 10*6/MM3 (ref 3.77–5.28)
RH BLD: POSITIVE
TRICYCLICS UR QL SCN: NEGATIVE
WBC NRBC COR # BLD AUTO: 10.79 10*3/MM3 (ref 3.4–10.8)

## 2024-05-13 PROCEDURE — 86900 BLOOD TYPING SEROLOGIC ABO: CPT

## 2024-05-13 PROCEDURE — 85025 COMPLETE CBC W/AUTO DIFF WBC: CPT

## 2024-05-13 PROCEDURE — 87491 CHLMYD TRACH DNA AMP PROBE: CPT

## 2024-05-13 PROCEDURE — 86780 TREPONEMA PALLIDUM: CPT

## 2024-05-13 PROCEDURE — 86901 BLOOD TYPING SEROLOGIC RH(D): CPT

## 2024-05-13 PROCEDURE — 81001 URINALYSIS AUTO W/SCOPE: CPT

## 2024-05-13 PROCEDURE — 86762 RUBELLA ANTIBODY: CPT

## 2024-05-13 PROCEDURE — 87340 HEPATITIS B SURFACE AG IA: CPT

## 2024-05-13 PROCEDURE — 36415 COLL VENOUS BLD VENIPUNCTURE: CPT

## 2024-05-13 PROCEDURE — 87591 N.GONORRHOEAE DNA AMP PROB: CPT

## 2024-05-13 PROCEDURE — G0432 EIA HIV-1/HIV-2 SCREEN: HCPCS

## 2024-05-13 PROCEDURE — 80307 DRUG TEST PRSMV CHEM ANLYZR: CPT

## 2024-05-13 PROCEDURE — 86787 VARICELLA-ZOSTER ANTIBODY: CPT

## 2024-05-13 PROCEDURE — 87086 URINE CULTURE/COLONY COUNT: CPT

## 2024-05-13 PROCEDURE — 83036 HEMOGLOBIN GLYCOSYLATED A1C: CPT

## 2024-05-13 PROCEDURE — 86803 HEPATITIS C AB TEST: CPT

## 2024-05-14 LAB
BACTERIA SPEC AEROBE CULT: NORMAL
BACTERIA UR QL AUTO: ABNORMAL /HPF
BILIRUB UR QL STRIP: NEGATIVE
CLARITY UR: ABNORMAL
COLOR UR: YELLOW
GLUCOSE UR STRIP-MCNC: NEGATIVE MG/DL
HBV SURFACE AG SERPL QL IA: NORMAL
HCV AB SER QL: NORMAL
HGB UR QL STRIP.AUTO: NEGATIVE
HIV 1+2 AB+HIV1 P24 AG SERPL QL IA: NORMAL
HYALINE CASTS UR QL AUTO: ABNORMAL /LPF
KETONES UR QL STRIP: NEGATIVE
LEUKOCYTE ESTERASE UR QL STRIP.AUTO: ABNORMAL
NITRITE UR QL STRIP: NEGATIVE
PH UR STRIP.AUTO: 6 [PH] (ref 5–8)
PROT UR QL STRIP: NEGATIVE
RBC # UR STRIP: ABNORMAL /HPF
REF LAB TEST METHOD: ABNORMAL
SP GR UR STRIP: >1.03 (ref 1–1.03)
SQUAMOUS #/AREA URNS HPF: ABNORMAL /HPF
T PALLIDUM IGG SER QL: NORMAL
UROBILINOGEN UR QL STRIP: ABNORMAL
WBC # UR STRIP: ABNORMAL /HPF

## 2024-05-15 LAB
RUBV IGG SERPL IA-ACNC: 1.57 INDEX
VZV IGG SER IA-ACNC: <135 INDEX

## 2024-05-16 LAB
C TRACH RRNA SPEC QL NAA+PROBE: NEGATIVE
N GONORRHOEA RRNA SPEC QL NAA+PROBE: NEGATIVE

## 2024-05-21 ENCOUNTER — TELEPHONE (OUTPATIENT)
Dept: FAMILY MEDICINE CLINIC | Facility: CLINIC | Age: 29
End: 2024-05-21
Payer: COMMERCIAL

## 2024-05-21 NOTE — TELEPHONE ENCOUNTER
SPOKE WITH PATIENT TO LET HER KNOW THAT [facility] AND SHE NEEDED TO  AND SIGN BUT PATIENT STATED SHE HAD ALREADY RECEIVED HER SHOES/ ADVISED PATIENT WOULD PUT IN SHRED CONTAINER / SHE VERBALLY AGREED

## 2024-09-29 DIAGNOSIS — F32.1 CURRENT MODERATE EPISODE OF MAJOR DEPRESSIVE DISORDER, UNSPECIFIED WHETHER RECURRENT: ICD-10-CM

## 2024-11-10 ENCOUNTER — HOSPITAL ENCOUNTER (OUTPATIENT)
Facility: HOSPITAL | Age: 29
Discharge: HOME | End: 2024-11-10
Attending: OBSTETRICS & GYNECOLOGY | Admitting: OBSTETRICS & GYNECOLOGY
Payer: COMMERCIAL

## 2024-11-10 ENCOUNTER — APPOINTMENT (OUTPATIENT)
Dept: CT IMAGING | Facility: HOSPITAL | Age: 29
End: 2024-11-10
Payer: COMMERCIAL

## 2024-11-10 VITALS
TEMPERATURE: 98 F | HEIGHT: 62 IN | RESPIRATION RATE: 20 BRPM | DIASTOLIC BLOOD PRESSURE: 76 MMHG | OXYGEN SATURATION: 97 % | BODY MASS INDEX: 46.01 KG/M2 | HEART RATE: 108 BPM | WEIGHT: 250 LBS | SYSTOLIC BLOOD PRESSURE: 117 MMHG

## 2024-11-10 PROBLEM — M54.9 BACK PAIN: Status: ACTIVE | Noted: 2024-11-10

## 2024-11-10 LAB
ABO GROUP BLD: NORMAL
ALBUMIN SERPL-MCNC: 3.5 G/DL (ref 3.5–5.2)
ALBUMIN/GLOB SERPL: 1.4 G/DL
ALP SERPL-CCNC: 119 U/L (ref 39–117)
ALT SERPL W P-5'-P-CCNC: 8 U/L (ref 1–33)
ANION GAP SERPL CALCULATED.3IONS-SCNC: 13 MMOL/L (ref 5–15)
AST SERPL-CCNC: 15 U/L (ref 1–32)
BACTERIA UR QL AUTO: ABNORMAL /HPF
BACTERIA UR QL AUTO: ABNORMAL /HPF
BACTERIA UR QL AUTO: NORMAL /HPF
BASOPHILS # BLD AUTO: 0.02 10*3/MM3 (ref 0–0.2)
BASOPHILS NFR BLD AUTO: 0.2 % (ref 0–1.5)
BILIRUB SERPL-MCNC: 0.4 MG/DL (ref 0–1.2)
BILIRUB UR QL STRIP: NEGATIVE
BLD GP AB SCN SERPL QL: NEGATIVE
BUN SERPL-MCNC: 8 MG/DL (ref 6–20)
BUN/CREAT SERPL: 15.4 (ref 7–25)
CALCIUM SPEC-SCNC: 9.5 MG/DL (ref 8.6–10.5)
CHLORIDE SERPL-SCNC: 105 MMOL/L (ref 98–107)
CLARITY UR: ABNORMAL
CO2 SERPL-SCNC: 20 MMOL/L (ref 22–29)
COLOR UR: YELLOW
CREAT SERPL-MCNC: 0.52 MG/DL (ref 0.57–1)
DEPRECATED RDW RBC AUTO: 45.1 FL (ref 37–54)
EGFRCR SERPLBLD CKD-EPI 2021: 128.4 ML/MIN/1.73
EOSINOPHIL # BLD AUTO: 0.06 10*3/MM3 (ref 0–0.4)
EOSINOPHIL NFR BLD AUTO: 0.7 % (ref 0.3–6.2)
ERYTHROCYTE [DISTWIDTH] IN BLOOD BY AUTOMATED COUNT: 13.8 % (ref 12.3–15.4)
GLOBULIN UR ELPH-MCNC: 2.5 GM/DL
GLUCOSE SERPL-MCNC: 84 MG/DL (ref 65–99)
GLUCOSE UR STRIP-MCNC: NEGATIVE MG/DL
HCT VFR BLD AUTO: 31.8 % (ref 34–46.6)
HGB BLD-MCNC: 10.6 G/DL (ref 12–15.9)
HGB UR QL STRIP.AUTO: ABNORMAL
HGB UR QL STRIP.AUTO: NEGATIVE
HGB UR QL STRIP.AUTO: NEGATIVE
HYALINE CASTS UR QL AUTO: ABNORMAL /LPF
HYALINE CASTS UR QL AUTO: ABNORMAL /LPF
HYALINE CASTS UR QL AUTO: NORMAL /LPF
IMM GRANULOCYTES # BLD AUTO: 0.08 10*3/MM3 (ref 0–0.05)
IMM GRANULOCYTES NFR BLD AUTO: 0.9 % (ref 0–0.5)
KETONES UR QL STRIP: ABNORMAL
KETONES UR QL STRIP: NEGATIVE
KETONES UR QL STRIP: NEGATIVE
LEUKOCYTE ESTERASE UR QL STRIP.AUTO: ABNORMAL
LEUKOCYTE ESTERASE UR QL STRIP.AUTO: ABNORMAL
LEUKOCYTE ESTERASE UR QL STRIP.AUTO: NEGATIVE
LYMPHOCYTES # BLD AUTO: 1.77 10*3/MM3 (ref 0.7–3.1)
LYMPHOCYTES NFR BLD AUTO: 19.2 % (ref 19.6–45.3)
MCH RBC QN AUTO: 29.9 PG (ref 26.6–33)
MCHC RBC AUTO-ENTMCNC: 33.3 G/DL (ref 31.5–35.7)
MCV RBC AUTO: 89.6 FL (ref 79–97)
MONOCYTES # BLD AUTO: 0.81 10*3/MM3 (ref 0.1–0.9)
MONOCYTES NFR BLD AUTO: 8.8 % (ref 5–12)
NEUTROPHILS NFR BLD AUTO: 6.48 10*3/MM3 (ref 1.7–7)
NEUTROPHILS NFR BLD AUTO: 70.2 % (ref 42.7–76)
NITRITE UR QL STRIP: NEGATIVE
NRBC BLD AUTO-RTO: 0 /100 WBC (ref 0–0.2)
PH UR STRIP.AUTO: 6 [PH] (ref 5–8)
PH UR STRIP.AUTO: 6 [PH] (ref 5–8)
PH UR STRIP.AUTO: 6.5 [PH] (ref 5–8)
PLATELET # BLD AUTO: 220 10*3/MM3 (ref 140–450)
PMV BLD AUTO: 11.1 FL (ref 6–12)
POTASSIUM SERPL-SCNC: 4.4 MMOL/L (ref 3.5–5.2)
PROT SERPL-MCNC: 6 G/DL (ref 6–8.5)
PROT UR QL STRIP: ABNORMAL
PROT UR QL STRIP: ABNORMAL
PROT UR QL STRIP: NEGATIVE
RBC # BLD AUTO: 3.55 10*6/MM3 (ref 3.77–5.28)
RBC # UR STRIP: ABNORMAL /HPF
RBC # UR STRIP: ABNORMAL /HPF
RBC # UR STRIP: NORMAL /HPF
REF LAB TEST METHOD: ABNORMAL
REF LAB TEST METHOD: ABNORMAL
REF LAB TEST METHOD: NORMAL
RH BLD: POSITIVE
SODIUM SERPL-SCNC: 138 MMOL/L (ref 136–145)
SP GR UR STRIP: 1.02 (ref 1–1.03)
SQUAMOUS #/AREA URNS HPF: ABNORMAL /HPF
SQUAMOUS #/AREA URNS HPF: ABNORMAL /HPF
SQUAMOUS #/AREA URNS HPF: NORMAL /HPF
T&S EXPIRATION DATE: NORMAL
UROBILINOGEN UR QL STRIP: ABNORMAL
WBC # UR STRIP: ABNORMAL /HPF
WBC # UR STRIP: ABNORMAL /HPF
WBC # UR STRIP: NORMAL /HPF
WBC NRBC COR # BLD AUTO: 9.22 10*3/MM3 (ref 3.4–10.8)

## 2024-11-10 PROCEDURE — 59025 FETAL NON-STRESS TEST: CPT

## 2024-11-10 PROCEDURE — 81001 URINALYSIS AUTO W/SCOPE: CPT | Performed by: OBSTETRICS & GYNECOLOGY

## 2024-11-10 PROCEDURE — 99221 1ST HOSP IP/OBS SF/LOW 40: CPT | Performed by: OBSTETRICS & GYNECOLOGY

## 2024-11-10 PROCEDURE — G0378 HOSPITAL OBSERVATION PER HR: HCPCS

## 2024-11-10 PROCEDURE — G0463 HOSPITAL OUTPT CLINIC VISIT: HCPCS

## 2024-11-10 PROCEDURE — 86900 BLOOD TYPING SEROLOGIC ABO: CPT | Performed by: OBSTETRICS & GYNECOLOGY

## 2024-11-10 PROCEDURE — 87086 URINE CULTURE/COLONY COUNT: CPT | Performed by: OBSTETRICS & GYNECOLOGY

## 2024-11-10 PROCEDURE — 59025 FETAL NON-STRESS TEST: CPT | Performed by: OBSTETRICS & GYNECOLOGY

## 2024-11-10 PROCEDURE — 74176 CT ABD & PELVIS W/O CONTRAST: CPT

## 2024-11-10 PROCEDURE — 86850 RBC ANTIBODY SCREEN: CPT | Performed by: OBSTETRICS & GYNECOLOGY

## 2024-11-10 PROCEDURE — 86901 BLOOD TYPING SEROLOGIC RH(D): CPT | Performed by: OBSTETRICS & GYNECOLOGY

## 2024-11-10 PROCEDURE — 80053 COMPREHEN METABOLIC PANEL: CPT | Performed by: OBSTETRICS & GYNECOLOGY

## 2024-11-10 PROCEDURE — 85025 COMPLETE CBC W/AUTO DIFF WBC: CPT | Performed by: OBSTETRICS & GYNECOLOGY

## 2024-11-10 PROCEDURE — 51701 INSERT BLADDER CATHETER: CPT

## 2024-11-10 RX ORDER — CYCLOBENZAPRINE HCL 10 MG
10 TABLET ORAL 3 TIMES DAILY
Status: DISCONTINUED | OUTPATIENT
Start: 2024-11-10 | End: 2024-11-10 | Stop reason: HOSPADM

## 2024-11-10 RX ORDER — PRENATAL WITH FERROUS FUM AND FOLIC ACID 3080; 920; 120; 400; 22; 1.84; 3; 20; 10; 1; 12; 200; 27; 25; 2 [IU]/1; [IU]/1; MG/1; [IU]/1; MG/1; MG/1; MG/1; MG/1; MG/1; MG/1; UG/1; MG/1; MG/1; MG/1; MG/1
1 TABLET ORAL DAILY
COMMUNITY

## 2024-11-10 RX ORDER — SODIUM CHLORIDE 0.9 % (FLUSH) 0.9 %
10 SYRINGE (ML) INJECTION EVERY 12 HOURS SCHEDULED
Status: DISCONTINUED | OUTPATIENT
Start: 2024-11-10 | End: 2024-11-10 | Stop reason: HOSPADM

## 2024-11-10 RX ORDER — SODIUM CHLORIDE 0.9 % (FLUSH) 0.9 %
1-10 SYRINGE (ML) INJECTION AS NEEDED
Status: DISCONTINUED | OUTPATIENT
Start: 2024-11-10 | End: 2024-11-10 | Stop reason: HOSPADM

## 2024-11-10 RX ORDER — SODIUM CHLORIDE 9 MG/ML
40 INJECTION, SOLUTION INTRAVENOUS AS NEEDED
Status: DISCONTINUED | OUTPATIENT
Start: 2024-11-10 | End: 2024-11-10 | Stop reason: HOSPADM

## 2024-11-10 RX ORDER — CYCLOBENZAPRINE HCL 10 MG
10 TABLET ORAL 3 TIMES DAILY
Qty: 12 TABLET | Refills: 0 | Status: ON HOLD | OUTPATIENT
Start: 2024-11-10 | End: 2024-11-15

## 2024-11-10 RX ADMIN — CYCLOBENZAPRINE HYDROCHLORIDE 10 MG: 10 TABLET, FILM COATED ORAL at 11:14

## 2024-11-10 NOTE — H&P
"[facility]  Obstetric History and Physical    Referring Provider: Tresa Garcia MD      Cc: Back pain.    Subjective     Patient is a 29 y.o. female  currently at 38w2d, who presents with complaint of back pain.  Patient reports severe constant low back pain that began last night without vaginal bleeding, leaking of fluid, recent trauma, fever, reports normal fetal activity patient denies any difficulty walking, loss of bowel or bladder function, numbness tingling lower extremity, or any other associated symptoms..   course complicated by 1  and 3 successful 's.  She is scheduled for induction of labor at 39 weeks gestation.        The following portions of the patients history were reviewed and updated as appropriate: current medications, allergies, past medical history, past surgical history, past family history, past social history, and problem list .       Prenatal Information:   Maternal Prenatal Labs  Blood Type ABO Type   Date Value Ref Range Status   11/10/2024 O  Final      Rh Status RH type   Date Value Ref Range Status   11/10/2024 Positive  Final      Antibody Screen Antibody Screen   Date Value Ref Range Status   11/10/2024 Negative  Final      Gonnorhea No results found for: \"GCCX\"   Chlamydia No results found for: \"CLAMYDCU\"   RPR No results found for: \"RPR\"   Syphilis Antibody No results found for: \"SYPHILIS\"   Rubella No results found for: \"RUBELLAIGGIN\"   Hepatitis B Surface Antigen No results found for: \"HEPBSAG\"   HIV-1 Antibody No results found for: \"LABHIV1\"   Hepatitis C Antibody No results found for: \"HEPCAB\"   Rapid Urin Drug Screen No results found for: \"AMPMETHU\", \"BARBITSCNUR\", \"LABBENZSCN\", \"LABMETHSCN\", \"LABOPIASCN\", \"THCURSCR\", \"COCAINEUR\", \"AMPHETSCREEN\", \"PROPOXSCN\", \"BUPRENORSCNU\", \"METAMPSCNUR\", \"OXYCODONESCN\", \"TRICYCLICSCN\"   Group B Strep Culture No results found for: \"GBSANTIGEN\"           External Prenatal Results       Pregnancy Outside " Results - Transcribed From Office Records - See Scanned Records For Details       Test Value Date Time    ABO  O  24 1549    Rh  Positive  24 1549    Antibody Screen       Varicella IgG  <135 index 24 1549    Rubella  1.57 index 24 1549    Hgb  12.2 g/dL 24 1549    Hct  36.4 % 24 1549    HgB A1c   5.30 % 24 1549    1h GTT       3h GTT Fasting       3h GTT 1 hour       3h GTT 2 hour       3h GTT 3 hour        Gonorrhea (discrete)  Negative  24 1813    Chlamydia (discrete)  Negative  24 1813    RPR       Syphils cascade: TP-Ab (FTA)  Non-Reactive  24 1549    TP-Ab  Non-Reactive  24 1549    TP-Ab (EIA)       TPPA       HBsAg  Non-Reactive  24 1549    Herpes Simplex Virus PCR       Herpes Simplex VIrus Culture       HIV  Non-Reactive  24 1549    Hep C RNA Quant PCR       Hep C Antibody  Non-Reactive  24 1549    AFP       NIPT       Cystic Fibroisis        Group B Strep       GBS Susceptibility to Clindamycin       GBS Susceptibility to Erythromycin       Fetal Fibronectin       Genetic Testing, Maternal Blood                 Drug Screening       Test Value Date Time    Urine Drug Screen       Amphetamine Screen  Negative  24 1549    Barbiturate Screen  Negative  24 1549    Benzodiazepine Screen  Negative  24 1549    Methadone Screen  Negative  24 1549    Phencyclidine Screen  Negative  24 1549    Opiates Screen  Negative  24 1549    THC Screen  Negative  24 1549    Cocaine Screen       Propoxyphene Screen       Buprenorphine Screen  Negative  24 1549    Methamphetamine Screen       Oxycodone Screen  Negative  24 1549    Tricyclic Antidepressants Screen  Negative  24 1549              Legend    ^: Historical                              Past OB History:       OB History    Para Term  AB Living   10 4 3 1 5 4   SAB IAB Ectopic Molar Multiple Live Births   1 0 0 0 0 4       # Outcome Date GA Lbr Aniceto/2nd Weight Sex Type Anes PTL Lv   10 Current            9 Term 19 39w3d  2975 g (6 lb 8.9 oz) M Vag-Spont None N BAY      Name: NAIDNE LAMAS      Apgar1: 8  Apgar5: 9   8 AB 18 8w0d          7 Term 16 37w0d   F    BAY   6 Term 16 40w0d   M    BAY   5 AB 01/01/15 8w0d          4  14 34w0d   M CS-LTranv   BAY   3 AB 14 8w0d          2 AB 13 8w0d          1 SAB 11 20w0d    Vag-Spont   ND       Past Medical History: Past Medical History:   Diagnosis Date    Abnormal Pap smear of cervix     Anxiety     Depression     Endometriosis     Gestational diabetes     w/ first 3 pregnancy    H/O LEEP 10/20/2021    Obesity     PCOS (polycystic ovarian syndrome)       Past Surgical History Past Surgical History:   Procedure Laterality Date     SECTION  2014    CHOLECYSTECTOMY  01/15/2018    ROOT CANAL  2023    tooth numbers 24, 25    TONSILLECTOMY        Family History: Family History   Problem Relation Age of Onset    Diabetes Mother     Hypertension Mother     Migraines Mother     Neuropathy Mother     Arthritis Mother     Diabetes Father     Heart disease Father         chf    Heart attack Father     Heart disease Paternal Grandfather       Social History:  reports that she quit smoking about 5 years ago. Her smoking use included cigarettes. She started smoking about 9 years ago. She has a 1.1 pack-year smoking history. She has been exposed to tobacco smoke. She has never used smokeless tobacco.   reports that she does not currently use alcohol.   reports that she does not currently use drugs after having used the following drugs: Marijuana.                   General ROS Negative Findings:Headaches, Visual Changes, Epigastric pain, Anorexia, Nausia/Vomiting, ROM, and Vaginal Bleeding    ROS     All other systems have been reviewed and are neg  Objective       Vital Signs Range for the last 24  hours  Temperature: Temp:  [98 °F (36.7 °C)] 98 °F (36.7 °C)   Temp Source: Temp src: Oral   BP: BP: (117)/(76) 117/76   Pulse: Heart Rate:  [129] 129   Respirations: Resp:  [20] 20   SPO2: SpO2:  [97 %] 97 %   O2 Amount (l/min):     O2 Devices     Weight: Weight:  [113 kg (250 lb)] 113 kg (250 lb)     Physical Examination:   General:   alert, appears stated age, and cooperative   Skin:   normal   HEENT:     Lungs:   clear to auscultation bilaterally   Heart:   regular rate and rhythm, S1, S2 normal, no murmur, click, rub or gallop   Gastrointestinal: Uterus, no guarding, no rebound, no guarding,   Lower Extremities: No edema, no calf tenderness   :    Musculoskeletal:     Neuro:   No focal deficits noted DTR 2+/4 no clonus         Presentation: vtx   Cervix: Exam by: Method: sterile vaginal exam performed   Dilation:  closed   Effacement: Cervical Effacement: 60   Station:  -2  No blood noted on glove.       Fetal Heart Rate Assessment   Method: Fetal HR Assessment Method: external   Beats/min: Fetal HR (beats/min): 130   Baseline: Fetal HR Baseline: normal range   Varibility: Fetal HR Variability: moderate (amplitude range 6 to 25 bpm)   Accels: Fetal HR Accelerations: greater than/equal to 15 bpm, lasting at least 15 seconds   Decels: Fetal HR Decelerations: absent   Tracing Category:     NST-indications Actamin, interpretation reactive, moderate bili, accelerations present 15 x 15, no fetal deceleration noted, onset  Uterine Assessment   Method: Method: external tocotransducer   Frequency (min): Contraction Frequency (Minutes): x1 traced   Ctx Count in 10 min:     Duration:     Intensity: Contraction Intensity: no contractions   Intensity by IUPC:     Resting Tone: Uterine Resting Tone: soft by palpation   Resting Tone by IUPC:     Linn Units:       Laboratory Results:   Lab Results (last 24 hours)       Procedure Component Value Units Date/Time    Comprehensive Metabolic Panel [[id number]]  (Abnormal)  Collected: 11/10/24 1357    Specimen: Blood Updated: 11/10/24 1431     Glucose 84 mg/dL      BUN 8 mg/dL      Creatinine 0.52 mg/dL      Sodium 138 mmol/L      Potassium 4.4 mmol/L      Comment: Slight hemolysis detected by analyzer. Result may be falsely elevated.        Chloride 105 mmol/L      CO2 20.0 mmol/L      Calcium 9.5 mg/dL      Total Protein 6.0 g/dL      Albumin 3.5 g/dL      ALT (SGPT) 8 U/L      AST (SGOT) 15 U/L      Alkaline Phosphatase 119 U/L      Total Bilirubin 0.4 mg/dL      Globulin 2.5 gm/dL      Comment: Calculated Result        A/G Ratio 1.4 g/dL      BUN/Creatinine Ratio 15.4     Anion Gap 13.0 mmol/L      eGFR 128.4 mL/min/1.73     Narrative:      GFR Normal >60  Chronic Kidney Disease <60  Kidney Failure <15      Urinalysis, Microscopic Only - Straight Cath [[id number]] Collected: 11/10/24 1357    Specimen: Urine from Straight Cath Updated: 11/10/24 1417     RBC, UA 0-2 /HPF      WBC, UA 0-2 /HPF      Bacteria, UA None Seen /HPF      Squamous Epithelial Cells, UA 0-2 /HPF      Hyaline Casts, UA None Seen /LPF      Methodology Automated Microscopy    Urinalysis With Microscopic If Indicated (No Culture) - Straight Cath [[id number]]  (Abnormal) Collected: 11/10/24 1357    Specimen: Urine from Straight Cath Updated: 11/10/24 1417     Color, UA Yellow     Appearance, UA Cloudy     pH, UA 6.5     Specific Gravity, UA 1.017     Glucose, UA Negative     Ketones, UA Negative     Bilirubin, UA Negative     Blood, UA Negative     Protein, UA Negative     Leuk Esterase, UA Negative     Nitrite, UA Negative     Urobilinogen, UA 1.0 E.U./dL    CBC & Differential [[id number]]  (Abnormal) Collected: 11/10/24 1357    Specimen: Blood Updated: 11/10/24 1408    Narrative:      The following orders were created for panel order CBC & Differential.  Procedure                               Abnormality         Status                     ---------                               -----------         ------                      CBC Auto Differential[[id number]]        Abnormal            Final result                 Please view results for these tests on the individual orders.    CBC Auto Differential [[id number]]  (Abnormal) Collected: 11/10/24 1357    Specimen: Blood Updated: 11/10/24 1408     WBC 9.22 10*3/mm3      RBC 3.55 10*6/mm3      Hemoglobin 10.6 g/dL      Hematocrit 31.8 %      MCV 89.6 fL      MCH 29.9 pg      MCHC 33.3 g/dL      RDW 13.8 %      RDW-SD 45.1 fl      MPV 11.1 fL      Platelets 220 10*3/mm3      Neutrophil % 70.2 %      Lymphocyte % 19.2 %      Monocyte % 8.8 %      Eosinophil % 0.7 %      Basophil % 0.2 %      Immature Grans % 0.9 %      Neutrophils, Absolute 6.48 10*3/mm3      Lymphocytes, Absolute 1.77 10*3/mm3      Monocytes, Absolute 0.81 10*3/mm3      Eosinophils, Absolute 0.06 10*3/mm3      Basophils, Absolute 0.02 10*3/mm3      Immature Grans, Absolute 0.08 10*3/mm3      nRBC 0.0 /100 WBC     Urine Culture - Urine, Urine, Clean Catch [[id number]] Collected: 11/10/24 1157    Specimen: Urine, Clean Catch Updated: 11/10/24 1300    Urinalysis With Microscopic If Indicated (No Culture) - Urine, Clean Catch [[id number]]  (Abnormal) Collected: 11/10/24 1157    Specimen: Urine, Clean Catch Updated: 11/10/24 1220     Color, UA Yellow     Appearance, UA Turbid     pH, UA 6.0     Specific Gravity, UA 1.025     Glucose, UA Negative     Ketones, UA Trace     Bilirubin, UA Negative     Blood, UA Moderate (2+)     Protein, UA Trace     Leuk Esterase, UA Moderate (2+)     Nitrite, UA Negative     Urobilinogen, UA 1.0 E.U./dL    Urinalysis, Microscopic Only - Urine, Clean Catch [[id number]]  (Abnormal) Collected: 11/10/24 1157    Specimen: Urine, Clean Catch Updated: 11/10/24 1220     RBC, UA 6-10 /HPF      WBC, UA 21-50 /HPF      Bacteria, UA 2+ /HPF      Squamous Epithelial Cells, UA 3-6 /HPF      Hyaline Casts, UA 7-12 /LPF      Methodology Manual Light Microscopy    Urinalysis With Microscopic If Indicated (No  Culture) - Urine, Clean Catch [[id number]]  (Abnormal) Collected: 11/10/24 1107    Specimen: Urine, Clean Catch Updated: 11/10/24 1117     Color, UA Yellow     Appearance, UA Turbid     pH, UA 6.0     Specific Gravity, UA 1.019     Glucose, UA Negative     Ketones, UA Negative     Bilirubin, UA Negative     Blood, UA Negative     Protein, UA Trace     Leuk Esterase, UA Trace     Nitrite, UA Negative     Urobilinogen, UA 1.0 E.U./dL    Urinalysis, Microscopic Only - Urine, Clean Catch [[id number]]  (Abnormal) Collected: 11/10/24 1107    Specimen: Urine, Clean Catch Updated: 11/10/24 1117     RBC, UA 3-5 /HPF      WBC, UA 6-10 /HPF      Bacteria, UA 3+ /HPF      Squamous Epithelial Cells, UA 21-30 /HPF      Hyaline Casts, UA 0-6 /LPF      Methodology Automated Microscopy          Radiology Review:   Imaging Results (Last 24 Hours)       Procedure Component Value Units Date/Time    CT Abdomen Pelvis Without Contrast [[id number]] Collected: 11/10/24 1458     Updated: 11/10/24 1508    Narrative:      CT ABDOMEN PELVIS WO CONTRAST    Date of Exam: 11/10/2024 2:17 PM EST    Indication: 38 weeks rule out ureterolithiasis.    Comparison: CT abdomen and pelvis 11/14/2020    Technique: Axial CT images were obtained of the abdomen and pelvis without the administration of contrast. Reconstructed coronal and sagittal images were also obtained. Automated exposure control and iterative construction methods were used.      Findings:  LUNG BASES:  Unremarkable without mass or infiltrate.    LIVER:  Unremarkable parenchyma without focal lesion.  BILIARY/GALLBLADDER: The gallbladder is surgically absent.  SPLEEN:  Unremarkable  PANCREAS:  Unremarkable  ADRENAL:  Unremarkable  KIDNEYS:  Unremarkable parenchyma with no solid mass identified. No obstruction.  No calculus identified.  GASTROINTESTINAL/MESENTERY:  No evidence of obstruction nor inflammation.  No free intraperitoneal gas or fluid. The appendix is normal in caliber and contains  gas.  AORTA/IVC:  Normal caliber.    RETROPERITONEUM/LYMPH NODES:  Unremarkable    REPRODUCTIVE: Intrauterine pregnancy is noted. The fetus is cephalic in presentation.  BLADDER:  Unremarkable    OSSEUS STRUCTURES: There is subchondral sclerosis in the sacroiliac joints bilaterally.      Impression:      Impression:    1. No evidence for obstructive uropathy or renal nephrolithiasis.    2. Intrauterine pregnancy.            Electronically Signed: Anahy Slaughter MD    11/10/2024 3:05 PM EST    Workstation ID: [id number]          Other Studies:    Assessment & Plan       Back pain        Assessment:  1.  Intrauterine pregnancy at 38w2d weeks gestation with reactive fetal status.    2.  Low back pain most likely musculoskeletal in nature.  No evidence of acute infection, obstructive uropathy, or neurological processes.  3.  False labor  4.      Plan:  1.  Discharge to home, rest, ice/heat, Rx Flexeril 3 times daily.  For patient's p.o. steroids.  She declined if symptoms worsen return for evaluation, labor instruction given, and kick count.  Patient scheduled for induction of labor at 39 weeks gestation.  2. Plan of care has been reviewed with patient.  3.  Risks, benefits of treatment plan have been discussed.  4.  All questions have been answered.  5      Rafi Oneill DO  11/10/2024  15:19 EST

## 2024-11-11 LAB — BACTERIA SPEC AEROBE CULT: NORMAL

## 2024-11-14 RX ORDER — ONDANSETRON 4 MG/1
4 TABLET, ORALLY DISINTEGRATING ORAL EVERY 6 HOURS PRN
Status: CANCELLED | OUTPATIENT
Start: 2024-11-14

## 2024-11-14 RX ORDER — LIDOCAINE HYDROCHLORIDE 10 MG/ML
0.5 INJECTION, SOLUTION EPIDURAL; INFILTRATION; INTRACAUDAL; PERINEURAL ONCE AS NEEDED
Status: CANCELLED | OUTPATIENT
Start: 2024-11-14

## 2024-11-14 RX ORDER — SODIUM CHLORIDE 0.9 % (FLUSH) 0.9 %
10 SYRINGE (ML) INJECTION EVERY 12 HOURS SCHEDULED
Status: CANCELLED | OUTPATIENT
Start: 2024-11-14

## 2024-11-14 RX ORDER — MAGNESIUM CARB/ALUMINUM HYDROX 105-160MG
30 TABLET,CHEWABLE ORAL ONCE
Status: CANCELLED | OUTPATIENT
Start: 2024-11-14 | End: 2024-11-14

## 2024-11-14 RX ORDER — OXYTOCIN/0.9 % SODIUM CHLORIDE 30/500 ML
250 PLASTIC BAG, INJECTION (ML) INTRAVENOUS CONTINUOUS
OUTPATIENT
Start: 2024-11-14 | End: 2024-11-14

## 2024-11-14 RX ORDER — FENTANYL CITRATE 50 UG/ML
100 INJECTION, SOLUTION INTRAMUSCULAR; INTRAVENOUS
Status: CANCELLED | OUTPATIENT
Start: 2024-11-14 | End: 2024-11-21

## 2024-11-14 RX ORDER — SODIUM CHLORIDE, SODIUM LACTATE, POTASSIUM CHLORIDE, CALCIUM CHLORIDE 600; 310; 30; 20 MG/100ML; MG/100ML; MG/100ML; MG/100ML
30 INJECTION, SOLUTION INTRAVENOUS CONTINUOUS
Status: CANCELLED | OUTPATIENT
Start: 2024-11-14 | End: 2024-11-15

## 2024-11-14 RX ORDER — METHYLERGONOVINE MALEATE 0.2 MG/ML
200 INJECTION INTRAVENOUS ONCE AS NEEDED
OUTPATIENT
Start: 2024-11-14

## 2024-11-14 RX ORDER — ONDANSETRON 2 MG/ML
4 INJECTION INTRAMUSCULAR; INTRAVENOUS EVERY 6 HOURS PRN
Status: CANCELLED | OUTPATIENT
Start: 2024-11-14

## 2024-11-14 RX ORDER — SODIUM CHLORIDE 9 MG/ML
40 INJECTION, SOLUTION INTRAVENOUS AS NEEDED
Status: CANCELLED | OUTPATIENT
Start: 2024-11-14

## 2024-11-14 RX ORDER — CARBOPROST TROMETHAMINE 250 UG/ML
250 INJECTION, SOLUTION INTRAMUSCULAR AS NEEDED
OUTPATIENT
Start: 2024-11-14

## 2024-11-14 RX ORDER — ACETAMINOPHEN 325 MG/1
650 TABLET ORAL EVERY 4 HOURS PRN
Status: CANCELLED | OUTPATIENT
Start: 2024-11-14

## 2024-11-14 RX ORDER — OXYTOCIN/0.9 % SODIUM CHLORIDE 30/500 ML
999 PLASTIC BAG, INJECTION (ML) INTRAVENOUS ONCE
OUTPATIENT
Start: 2024-11-14 | End: 2024-11-14

## 2024-11-14 RX ORDER — IBUPROFEN 600 MG/1
600 TABLET, FILM COATED ORAL EVERY 6 HOURS PRN
OUTPATIENT
Start: 2024-11-14

## 2024-11-14 RX ORDER — OXYTOCIN/0.9 % SODIUM CHLORIDE 30/500 ML
2-20 PLASTIC BAG, INJECTION (ML) INTRAVENOUS
Status: CANCELLED | OUTPATIENT
Start: 2024-11-15

## 2024-11-14 RX ORDER — FENTANYL CITRATE 50 UG/ML
50 INJECTION, SOLUTION INTRAMUSCULAR; INTRAVENOUS
Status: CANCELLED | OUTPATIENT
Start: 2024-11-14 | End: 2024-11-21

## 2024-11-14 RX ORDER — TERBUTALINE SULFATE 1 MG/ML
0.25 INJECTION, SOLUTION SUBCUTANEOUS AS NEEDED
Status: CANCELLED | OUTPATIENT
Start: 2024-11-14

## 2024-11-14 RX ORDER — SODIUM CHLORIDE 0.9 % (FLUSH) 0.9 %
10 SYRINGE (ML) INJECTION AS NEEDED
Status: CANCELLED | OUTPATIENT
Start: 2024-11-14

## 2024-11-14 RX ORDER — FAMOTIDINE 20 MG/1
20 TABLET, FILM COATED ORAL EVERY 12 HOURS PRN
Status: CANCELLED | OUTPATIENT
Start: 2024-11-14

## 2024-11-14 RX ORDER — MISOPROSTOL 200 UG/1
800 TABLET ORAL AS NEEDED
OUTPATIENT
Start: 2024-11-14

## 2024-11-15 ENCOUNTER — ANESTHESIA (OUTPATIENT)
Dept: LABOR AND DELIVERY | Facility: HOSPITAL | Age: 29
End: 2024-11-15
Payer: COMMERCIAL

## 2024-11-15 ENCOUNTER — ANESTHESIA EVENT (OUTPATIENT)
Dept: LABOR AND DELIVERY | Facility: HOSPITAL | Age: 29
End: 2024-11-15
Payer: COMMERCIAL

## 2024-11-15 ENCOUNTER — HOSPITAL ENCOUNTER (OUTPATIENT)
Dept: LABOR AND DELIVERY | Facility: HOSPITAL | Age: 29
Discharge: HOME | End: 2024-11-15
Payer: COMMERCIAL

## 2024-11-15 ENCOUNTER — HOSPITAL ENCOUNTER (INPATIENT)
Facility: HOSPITAL | Age: 29
LOS: 2 days | Discharge: HOME | End: 2024-11-17
Attending: OBSTETRICS & GYNECOLOGY | Admitting: OBSTETRICS & GYNECOLOGY
Payer: COMMERCIAL

## 2024-11-15 PROBLEM — Z3A.39 39 WEEKS GESTATION OF PREGNANCY: Status: ACTIVE | Noted: 2024-11-15

## 2024-11-15 PROBLEM — Z3A.39 39 WEEKS GESTATION OF PREGNANCY: Status: RESOLVED | Noted: 2024-11-15 | Resolved: 2024-11-15

## 2024-11-15 LAB
ABO GROUP BLD: NORMAL
BLD GP AB SCN SERPL QL: NEGATIVE
DEPRECATED RDW RBC AUTO: 44.4 FL (ref 37–54)
ERYTHROCYTE [DISTWIDTH] IN BLOOD BY AUTOMATED COUNT: 13.8 % (ref 12.3–15.4)
HCT VFR BLD AUTO: 31.1 % (ref 34–46.6)
HGB BLD-MCNC: 10.4 G/DL (ref 12–15.9)
MCH RBC QN AUTO: 29.8 PG (ref 26.6–33)
MCHC RBC AUTO-ENTMCNC: 33.4 G/DL (ref 31.5–35.7)
MCV RBC AUTO: 89.1 FL (ref 79–97)
PLATELET # BLD AUTO: 232 10*3/MM3 (ref 140–450)
PMV BLD AUTO: 10.8 FL (ref 6–12)
RBC # BLD AUTO: 3.49 10*6/MM3 (ref 3.77–5.28)
RH BLD: POSITIVE
T&S EXPIRATION DATE: NORMAL
TREPONEMA PALLIDUM IGG+IGM AB [PRESENCE] IN SERUM OR PLASMA BY IMMUNOASSAY: NORMAL
WBC NRBC COR # BLD AUTO: 11 10*3/MM3 (ref 3.4–10.8)

## 2024-11-15 PROCEDURE — 86900 BLOOD TYPING SEROLOGIC ABO: CPT | Performed by: ADVANCED PRACTICE MIDWIFE

## 2024-11-15 PROCEDURE — 86780 TREPONEMA PALLIDUM: CPT | Performed by: ADVANCED PRACTICE MIDWIFE

## 2024-11-15 PROCEDURE — 85027 COMPLETE CBC AUTOMATED: CPT | Performed by: ADVANCED PRACTICE MIDWIFE

## 2024-11-15 PROCEDURE — 86850 RBC ANTIBODY SCREEN: CPT | Performed by: ADVANCED PRACTICE MIDWIFE

## 2024-11-15 PROCEDURE — 25010000002 LIDOCAINE-EPINEPHRINE (PF) 1.5 %-1:200000 SOLUTION: Performed by: ANESTHESIOLOGY

## 2024-11-15 PROCEDURE — 25010000002 FENTANYL CITRATE (PF) 50 MCG/ML SOLUTION: Performed by: ANESTHESIOLOGY

## 2024-11-15 PROCEDURE — 25010000002 ROPIVACAINE PER 1 MG: Performed by: ANESTHESIOLOGY

## 2024-11-15 PROCEDURE — 59025 FETAL NON-STRESS TEST: CPT

## 2024-11-15 PROCEDURE — 25010000002 ONDANSETRON PER 1 MG: Performed by: ANESTHESIOLOGY

## 2024-11-15 PROCEDURE — 25810000003 LACTATED RINGERS PER 1000 ML: Performed by: ADVANCED PRACTICE MIDWIFE

## 2024-11-15 PROCEDURE — C1755 CATHETER, INTRASPINAL: HCPCS | Performed by: ANESTHESIOLOGY

## 2024-11-15 PROCEDURE — 86901 BLOOD TYPING SEROLOGIC RH(D): CPT | Performed by: ADVANCED PRACTICE MIDWIFE

## 2024-11-15 PROCEDURE — 3E033VJ INTRODUCTION OF OTHER HORMONE INTO PERIPHERAL VEIN, PERCUTANEOUS APPROACH: ICD-10-PCS | Performed by: ADVANCED PRACTICE MIDWIFE

## 2024-11-15 PROCEDURE — 36415 COLL VENOUS BLD VENIPUNCTURE: CPT | Performed by: ADVANCED PRACTICE MIDWIFE

## 2024-11-15 RX ORDER — FENTANYL CITRATE 50 UG/ML
100 INJECTION, SOLUTION INTRAMUSCULAR; INTRAVENOUS
Status: DISCONTINUED | OUTPATIENT
Start: 2024-11-15 | End: 2024-11-15 | Stop reason: HOSPADM

## 2024-11-15 RX ORDER — ACETAMINOPHEN 325 MG/1
650 TABLET ORAL EVERY 4 HOURS PRN
Status: DISCONTINUED | OUTPATIENT
Start: 2024-11-15 | End: 2024-11-15 | Stop reason: HOSPADM

## 2024-11-15 RX ORDER — FAMOTIDINE 10 MG/ML
20 INJECTION, SOLUTION INTRAVENOUS ONCE AS NEEDED
Status: DISCONTINUED | OUTPATIENT
Start: 2024-11-15 | End: 2024-11-15 | Stop reason: HOSPADM

## 2024-11-15 RX ORDER — ROPIVACAINE HYDROCHLORIDE 5 MG/ML
INJECTION, SOLUTION EPIDURAL; INFILTRATION; PERINEURAL AS NEEDED
Status: DISCONTINUED | OUTPATIENT
Start: 2024-11-15 | End: 2024-11-15 | Stop reason: SURG

## 2024-11-15 RX ORDER — FAMOTIDINE 20 MG/1
20 TABLET, FILM COATED ORAL EVERY 12 HOURS PRN
Status: DISCONTINUED | OUTPATIENT
Start: 2024-11-15 | End: 2024-11-15 | Stop reason: HOSPADM

## 2024-11-15 RX ORDER — SODIUM CHLORIDE 0.9 % (FLUSH) 0.9 %
10 SYRINGE (ML) INJECTION AS NEEDED
Status: DISCONTINUED | OUTPATIENT
Start: 2024-11-15 | End: 2024-11-15 | Stop reason: HOSPADM

## 2024-11-15 RX ORDER — EPHEDRINE SULFATE 5 MG/ML
INJECTION INTRAVENOUS
Status: DISCONTINUED
Start: 2024-11-15 | End: 2024-11-15 | Stop reason: WASHOUT

## 2024-11-15 RX ORDER — MAGNESIUM CARB/ALUMINUM HYDROX 105-160MG
30 TABLET,CHEWABLE ORAL ONCE
Status: DISCONTINUED | OUTPATIENT
Start: 2024-11-15 | End: 2024-11-15 | Stop reason: HOSPADM

## 2024-11-15 RX ORDER — METHYLERGONOVINE MALEATE 0.2 MG/ML
INJECTION INTRAVENOUS
Status: DISCONTINUED
Start: 2024-11-15 | End: 2024-11-15 | Stop reason: WASHOUT

## 2024-11-15 RX ORDER — EPHEDRINE SULFATE 5 MG/ML
10 INJECTION INTRAVENOUS
Status: DISCONTINUED | OUTPATIENT
Start: 2024-11-15 | End: 2024-11-15 | Stop reason: HOSPADM

## 2024-11-15 RX ORDER — ONDANSETRON 2 MG/ML
4 INJECTION INTRAMUSCULAR; INTRAVENOUS EVERY 6 HOURS PRN
Status: DISCONTINUED | OUTPATIENT
Start: 2024-11-15 | End: 2024-11-17 | Stop reason: HOSPADM

## 2024-11-15 RX ORDER — FENTANYL CITRATE 50 UG/ML
50 INJECTION, SOLUTION INTRAMUSCULAR; INTRAVENOUS
Status: DISCONTINUED | OUTPATIENT
Start: 2024-11-15 | End: 2024-11-15 | Stop reason: HOSPADM

## 2024-11-15 RX ORDER — OXYTOCIN/0.9 % SODIUM CHLORIDE 30/500 ML
2-20 PLASTIC BAG, INJECTION (ML) INTRAVENOUS
Status: DISCONTINUED | OUTPATIENT
Start: 2024-11-15 | End: 2024-11-15 | Stop reason: HOSPADM

## 2024-11-15 RX ORDER — ONDANSETRON 2 MG/ML
4 INJECTION INTRAMUSCULAR; INTRAVENOUS EVERY 6 HOURS PRN
Status: DISCONTINUED | OUTPATIENT
Start: 2024-11-15 | End: 2024-11-15 | Stop reason: HOSPADM

## 2024-11-15 RX ORDER — IBUPROFEN 600 MG/1
600 TABLET, FILM COATED ORAL EVERY 6 HOURS PRN
Status: DISCONTINUED | OUTPATIENT
Start: 2024-11-15 | End: 2024-11-17 | Stop reason: HOSPADM

## 2024-11-15 RX ORDER — DIPHENHYDRAMINE HYDROCHLORIDE 50 MG/ML
12.5 INJECTION INTRAMUSCULAR; INTRAVENOUS EVERY 8 HOURS PRN
Status: DISCONTINUED | OUTPATIENT
Start: 2024-11-15 | End: 2024-11-15 | Stop reason: HOSPADM

## 2024-11-15 RX ORDER — SODIUM CHLORIDE 0.9 % (FLUSH) 0.9 %
1-10 SYRINGE (ML) INJECTION AS NEEDED
Status: DISCONTINUED | OUTPATIENT
Start: 2024-11-15 | End: 2024-11-17 | Stop reason: HOSPADM

## 2024-11-15 RX ORDER — METOCLOPRAMIDE HYDROCHLORIDE 5 MG/ML
10 INJECTION INTRAMUSCULAR; INTRAVENOUS ONCE AS NEEDED
Status: DISCONTINUED | OUTPATIENT
Start: 2024-11-15 | End: 2024-11-15 | Stop reason: HOSPADM

## 2024-11-15 RX ORDER — CITRIC ACID/SODIUM CITRATE 334-500MG
30 SOLUTION, ORAL ORAL ONCE
Status: DISCONTINUED | OUTPATIENT
Start: 2024-11-15 | End: 2024-11-15 | Stop reason: HOSPADM

## 2024-11-15 RX ORDER — DOCUSATE SODIUM 100 MG/1
100 CAPSULE, LIQUID FILLED ORAL 2 TIMES DAILY
Status: DISCONTINUED | OUTPATIENT
Start: 2024-11-15 | End: 2024-11-17 | Stop reason: HOSPADM

## 2024-11-15 RX ORDER — LIDOCAINE HYDROCHLORIDE 10 MG/ML
0.5 INJECTION, SOLUTION EPIDURAL; INFILTRATION; INTRACAUDAL; PERINEURAL ONCE AS NEEDED
Status: DISCONTINUED | OUTPATIENT
Start: 2024-11-15 | End: 2024-11-15 | Stop reason: HOSPADM

## 2024-11-15 RX ORDER — HYDROCODONE BITARTRATE AND ACETAMINOPHEN 10; 325 MG/1; MG/1
1 TABLET ORAL EVERY 4 HOURS PRN
Status: DISCONTINUED | OUTPATIENT
Start: 2024-11-15 | End: 2024-11-17 | Stop reason: HOSPADM

## 2024-11-15 RX ORDER — MISOPROSTOL 200 UG/1
TABLET ORAL
Status: DISCONTINUED
Start: 2024-11-15 | End: 2024-11-15 | Stop reason: WASHOUT

## 2024-11-15 RX ORDER — SODIUM CHLORIDE 9 MG/ML
40 INJECTION, SOLUTION INTRAVENOUS AS NEEDED
Status: DISCONTINUED | OUTPATIENT
Start: 2024-11-15 | End: 2024-11-15 | Stop reason: HOSPADM

## 2024-11-15 RX ORDER — OXYTOCIN/0.9 % SODIUM CHLORIDE 30/500 ML
125 PLASTIC BAG, INJECTION (ML) INTRAVENOUS ONCE AS NEEDED
Status: DISCONTINUED | OUTPATIENT
Start: 2024-11-15 | End: 2024-11-17 | Stop reason: HOSPADM

## 2024-11-15 RX ORDER — PRENATAL VIT/IRON FUM/FOLIC AC 27MG-0.8MG
1 TABLET ORAL DAILY
Status: DISCONTINUED | OUTPATIENT
Start: 2024-11-15 | End: 2024-11-17 | Stop reason: HOSPADM

## 2024-11-15 RX ORDER — SODIUM CHLORIDE 0.9 % (FLUSH) 0.9 %
10 SYRINGE (ML) INJECTION EVERY 12 HOURS SCHEDULED
Status: DISCONTINUED | OUTPATIENT
Start: 2024-11-15 | End: 2024-11-15 | Stop reason: HOSPADM

## 2024-11-15 RX ORDER — BISACODYL 10 MG
10 SUPPOSITORY, RECTAL RECTAL DAILY PRN
Status: DISCONTINUED | OUTPATIENT
Start: 2024-11-16 | End: 2024-11-17 | Stop reason: HOSPADM

## 2024-11-15 RX ORDER — FENTANYL CITRATE 50 UG/ML
INJECTION, SOLUTION INTRAMUSCULAR; INTRAVENOUS AS NEEDED
Status: DISCONTINUED | OUTPATIENT
Start: 2024-11-15 | End: 2024-11-15 | Stop reason: SURG

## 2024-11-15 RX ORDER — LIDOCAINE HYDROCHLORIDE AND EPINEPHRINE 15; 5 MG/ML; UG/ML
INJECTION, SOLUTION EPIDURAL AS NEEDED
Status: DISCONTINUED | OUTPATIENT
Start: 2024-11-15 | End: 2024-11-15 | Stop reason: SURG

## 2024-11-15 RX ORDER — TERBUTALINE SULFATE 1 MG/ML
0.25 INJECTION, SOLUTION SUBCUTANEOUS AS NEEDED
Status: DISCONTINUED | OUTPATIENT
Start: 2024-11-15 | End: 2024-11-15 | Stop reason: HOSPADM

## 2024-11-15 RX ORDER — ROPIVACAINE HYDROCHLORIDE 2 MG/ML
15 INJECTION, SOLUTION EPIDURAL; INFILTRATION; PERINEURAL CONTINUOUS
Status: DISCONTINUED | OUTPATIENT
Start: 2024-11-15 | End: 2024-11-15

## 2024-11-15 RX ORDER — ONDANSETRON 2 MG/ML
4 INJECTION INTRAMUSCULAR; INTRAVENOUS ONCE AS NEEDED
Status: COMPLETED | OUTPATIENT
Start: 2024-11-15 | End: 2024-11-15

## 2024-11-15 RX ORDER — FAMOTIDINE 10 MG/ML
20 INJECTION, SOLUTION INTRAVENOUS EVERY 12 HOURS PRN
Status: DISCONTINUED | OUTPATIENT
Start: 2024-11-15 | End: 2024-11-15 | Stop reason: HOSPADM

## 2024-11-15 RX ORDER — SODIUM CHLORIDE, SODIUM LACTATE, POTASSIUM CHLORIDE, CALCIUM CHLORIDE 600; 310; 30; 20 MG/100ML; MG/100ML; MG/100ML; MG/100ML
30 INJECTION, SOLUTION INTRAVENOUS CONTINUOUS
Status: DISCONTINUED | OUTPATIENT
Start: 2024-11-15 | End: 2024-11-15

## 2024-11-15 RX ORDER — ONDANSETRON 4 MG/1
4 TABLET, ORALLY DISINTEGRATING ORAL EVERY 6 HOURS PRN
Status: DISCONTINUED | OUTPATIENT
Start: 2024-11-15 | End: 2024-11-15 | Stop reason: HOSPADM

## 2024-11-15 RX ORDER — HYDROCODONE BITARTRATE AND ACETAMINOPHEN 5; 325 MG/1; MG/1
1 TABLET ORAL EVERY 4 HOURS PRN
Status: DISCONTINUED | OUTPATIENT
Start: 2024-11-15 | End: 2024-11-17 | Stop reason: HOSPADM

## 2024-11-15 RX ORDER — ACETAMINOPHEN 325 MG/1
650 TABLET ORAL EVERY 6 HOURS PRN
Status: DISCONTINUED | OUTPATIENT
Start: 2024-11-15 | End: 2024-11-17 | Stop reason: HOSPADM

## 2024-11-15 RX ORDER — HYDROCORTISONE 25 MG/G
1 CREAM TOPICAL AS NEEDED
Status: DISCONTINUED | OUTPATIENT
Start: 2024-11-15 | End: 2024-11-17 | Stop reason: HOSPADM

## 2024-11-15 RX ADMIN — IBUPROFEN 600 MG: 600 TABLET, FILM COATED ORAL at 20:39

## 2024-11-15 RX ADMIN — FENTANYL CITRATE 100 MCG: 50 INJECTION, SOLUTION INTRAMUSCULAR; INTRAVENOUS at 05:21

## 2024-11-15 RX ADMIN — Medication: at 16:43

## 2024-11-15 RX ADMIN — ROPIVACAINE HYDROCHLORIDE 15 ML/HR: 2 INJECTION, SOLUTION EPIDURAL; INFILTRATION at 05:25

## 2024-11-15 RX ADMIN — LIDOCAINE HYDROCHLORIDE AND EPINEPHRINE 3 ML: 15; 5 INJECTION, SOLUTION EPIDURAL at 05:15

## 2024-11-15 RX ADMIN — WITCH HAZEL: 500 SOLUTION RECTAL; TOPICAL at 16:43

## 2024-11-15 RX ADMIN — Medication 2 MILLI-UNITS/MIN: at 02:11

## 2024-11-15 RX ADMIN — ROPIVACAINE HYDROCHLORIDE 6 ML: 5 INJECTION, SOLUTION EPIDURAL; INFILTRATION; PERINEURAL at 05:21

## 2024-11-15 RX ADMIN — ONDANSETRON 4 MG: 2 INJECTION INTRAMUSCULAR; INTRAVENOUS at 05:55

## 2024-11-15 RX ADMIN — LIDOCAINE HYDROCHLORIDE AND EPINEPHRINE 2 ML: 15; 5 INJECTION, SOLUTION EPIDURAL at 05:18

## 2024-11-15 RX ADMIN — SODIUM CHLORIDE, POTASSIUM CHLORIDE, SODIUM LACTATE AND CALCIUM CHLORIDE 30 ML/HR: 600; 310; 30; 20 INJECTION, SOLUTION INTRAVENOUS at 05:17

## 2024-11-15 RX ADMIN — SODIUM CHLORIDE, POTASSIUM CHLORIDE, SODIUM LACTATE AND CALCIUM CHLORIDE 30 ML/HR: 600; 310; 30; 20 INJECTION, SOLUTION INTRAVENOUS at 02:08

## 2024-11-15 NOTE — H&P
"[address] [place] [address]  Obstetric History and Physical    Chief Complaint   Patient presents with    Scheduled Induction       Subjective     Patient is a 29 y.o. female  currently at 39w0d, who presents for induction of labor  for elective  with favorable cervix. She voices good fetal movement. She denies vaginal bleeding and leaking of fluid. She is comfortable with epidural.     Her prenatal care is complicated by  prior   desires .  Her previous obstetric/gynecological history is remarkable for  x1 with successful  x3 and obesity. Hx GDM with 3 previous pregnancies. Did not test with this pregnancy. HgbA1c 5.3. EFW 6-7 (66%) at 36 weeks.      The following portions of the patients history were reviewed and updated as appropriate: current medications, allergies, past medical history, past surgical history, past family history, past social history, and problem list .       Prenatal Information:   Maternal Prenatal Labs  Blood Type ABO Type   Date Value Ref Range Status   11/15/2024 O  Final      Rh Status RH type   Date Value Ref Range Status   11/15/2024 Positive  Final      Antibody Screen Antibody Screen   Date Value Ref Range Status   11/15/2024 Negative  Final      Gonnorhea No results found for: \"GCCX\"   Chlamydia No results found for: \"CLAMYDCU\"   RPR No results found for: \"RPR\"   Syphilis Antibody No results found for: \"SYPHILIS\"   Rubella No results found for: \"RUBELLAIGGIN\"   Hepatitis B Surface Antigen No results found for: \"HEPBSAG\"   HIV-1 Antibody No results found for: \"LABHIV1\"   Hepatitis C Antibody No results found for: \"HEPCAB\"   Rapid Urin Drug Screen No results found for: \"AMPMETHU\", \"BARBITSCNUR\", \"LABBENZSCN\", \"LABMETHSCN\", \"LABOPIASCN\", \"THCURSCR\", \"COCAINEUR\", \"AMPHETSCREEN\", \"PROPOXSCN\", \"BUPRENORSCNU\", \"METAMPSCNUR\", \"OXYCODONESCN\", \"TRICYCLICSCN\"   Group B Strep Culture Negative                 Past OB History:       OB History    Para Term  AB " Living   11 4 3 1 6 4   SAB IAB Ectopic Molar Multiple Live Births   2 0 0 0 0 4      # Outcome Date GA Lbr Aniceto/2nd Weight Sex Type Anes PTL Lv   11 Current            10 Term 19 39w3d  2975 g (6 lb 8.9 oz) M Vag-Spont None N BAY      Name: NADINE LAMAS      Apgar1: 8  Apgar5: 9   9 2019           8 AB 18 8w0d          7 Term 16 37w0d  3714 g (8 lb 3 oz) F  EPI  BAY   6 Term 16 40w0d  3572 g (7 lb 14 oz) M  EPI  BAY   5 AB 01/01/15 8w0d          4  14 34w0d  2353 g (5 lb 3 oz) M CS-LTranv EPI  BAY      Complications: Fetal Intolerance   3 AB 14 8w0d          2 AB 13 8w0d          1 SAB 11 20w0d    Vag-Spont   ND       Past Medical History: Past Medical History:   Diagnosis Date    Abnormal Pap smear of cervix     Anxiety     Depression     Endometriosis     Gestational diabetes     w/ first 3 pregnancy    H/O LEEP 10/20/2021    Obesity     PCOS (polycystic ovarian syndrome)       Past Surgical History Past Surgical History:   Procedure Laterality Date     SECTION  2014    CHOLECYSTECTOMY  01/15/2018    ROOT CANAL  2023    tooth numbers 24, 25    TONSILLECTOMY        Family History: Family History   Problem Relation Age of Onset    Diabetes Mother     Hypertension Mother     Migraines Mother     Neuropathy Mother     Arthritis Mother     Diabetes Father     Heart disease Father         chf    Heart attack Father     Heart disease Paternal Grandfather       Social History:  reports that she quit smoking about 5 years ago. Her smoking use included cigarettes. She started smoking about 9 years ago. She has a 1.1 pack-year smoking history. She has been exposed to tobacco smoke. She has never used smokeless tobacco.   reports that she does not currently use alcohol.   reports that she does not currently use drugs after having used the following drugs: Marijuana.        REVIEW OF SYSTEMS             Reports fetal movement is  normal             Denies leakage of amniotic fluid.             Denies vaginal bleeding             She reports Regular contractions, frequency: Every 3-4 minutes, intensity moderate             All other systems reviewed and are negative    Objective       Vital Signs Range for the last 24 hours  Temperature: Temp:  [97.5 °F (36.4 °C)-97.8 °F (36.6 °C)] 97.5 °F (36.4 °C)   Temp Source: Temp src: Oral   BP: BP: ()/(48-87) 96/51   Pulse: Heart Rate:  [] 96   Respirations: Resp:  [15] 15   SPO2:     O2 Amount (l/min):     O2 Devices Device (Oxygen Therapy): room air   Weight: Weight:  [113 kg (250 lb)] 113 kg (250 lb)       Presentation: vertex   Cervix: Exam by:  PENNY Jiang CNM   Dilation:  4-5   Effacement: Cervical Effacement: 90   Station:  -2       Fetal Heart Rate Assessment   Method: Fetal HR Assessment Method: Telemetry/Wireless Fetal HR Monitor   Beats/min: Fetal HR (beats/min): 125   Baseline: Fetal HR Baseline: normal range   Varibility: Fetal HR Variability: moderate (amplitude range 6 to 25 bpm)   Accels: Fetal HR Accelerations: greater than/equal to 15 bpm, lasting at least 15 seconds   Decels: Fetal HR Decelerations: absent   Tracing Category:  1    NST: REACTIVE     Uterine Assessment   Method: Method: telemetry   Frequency (min): Contraction Frequency (Minutes): 4-5   Ctx Count in 10 min:     Duration:     Intensity: Contraction Intensity: mild by palpation   Intensity by IUPC:     Resting Tone: Uterine Resting Tone: soft by palpation   Resting Tone by IUPC:     [place] Units:             Constitutional:  Well developed, well nourished, no acute distress, well-groomed.   Respiratory:  Resp e/e/u, normal breath sounds.   Cardiovascular:  Normal rate and rhythm, no murmurs.   Gastrointestinal:  Soft, gravid, nontender.  Uterus: Soft, nontender. Fundus appropriate for dates.  Neurologic:  Alert & oriented x 3,  no focal deficits noted.   Psychiatric:  Speech and behavior appropriate.    Extremities: no cyanosis, clubbing or edema, no evidence of DVT.        Labs:  Lab Results   Component Value Date    WBC 11.00 (H) 11/15/2024    HGB 10.4 (L) 11/15/2024    HCT 31.1 (L) 11/15/2024    MCV 89.1 11/15/2024     11/15/2024    CREATININE 0.52 (L) 11/10/2024    URICACID 6.6 (H) 2019    AST 15 11/10/2024    ALT 8 11/10/2024     2019     Results from last 7 days   Lab Units 11/15/24  0126   ABO TYPING  O   RH TYPING  Positive   ANTIBODY SCREEN  Negative           Assessment & Plan       39 weeks gestation of pregnancy        Assessment:  1.  Intrauterine pregnancy at 39w0d weeks gestation with reactive fetal status.    2.   induction of labor  for elective  with favorable cervix  3.  Obstetrical history is remarkable for  x1.  4.  Successful  x3  5.  Obesity  6.  GBS status: Negative    Plan:  1.Oxytocin induction  2. AROM, clear fluid  3. Plan of care has been reviewed with patient and questions answered.  4.  Risks, benefits of treatment plan have been discussed.  5.  All questions have been answered.        Miah Jiang CNM  11/15/2024  08:54 EST

## 2024-11-15 NOTE — L&D DELIVERY NOTE
[facility]   Vaginal Delivery Note    Patient Name: Leona Murillo  : 1995  MRN: [id number]    Date of Delivery: 11/15/2024    Diagnosis     Pre & Post-Delivery:  Intrauterine pregnancy at 39w0d  Labor status: Induced Onset of Labor     (normal spontaneous vaginal delivery)             Problem List    Transfer to Postpartum     Review the Delivery Report for details.     Delivery     Delivery: , Spontaneous    YOB: 2025   Time of Birth:  Gestational Age 1:13 PM  39w0d     Anesthesia: Epidural    Delivering clinician: Miah Jiang   Forceps?   No   Vacuum? No    Shoulder dystocia present: No        Delivery narrative:  Patient at 39w0d pushed to deliver a viable female infant over an intact perineum with epidural anesthesia. Infant's head delivered and a loose nuchal x1 was found and easily reduced over fetal head. The anterior shoulder and body delivered atraumatically. Vigorous infant was placed on mom's abdomen where the cord was allowed to stop pulsating and was clamped x2 and cut by FOB. Placenta delivered spontaneously and appeared to be intact. Perineum was inspected and found to be intact. EBL 50ml. Mother and infant stable, bonding.         Infant     Findings: female infant     Infant observations: Weight: 3265 g (7 lb 3.2 oz)  Length: 19.5 in  Observations/Comments:        Apgars: 7  @ 1 minute /    9  @ 5 minutes   Infant Name: Talia     Placenta & Cord         Placenta delivered  Spontaneous at   11/15/2024  1:22 PM    Cord: 3 vessels present.   Nuchal Cord?  yes; Number of nuchal loops present:   1   Cord blood obtained: Yes   Cord gases obtained:  No             Repair     Episiotomy: None        Lacerations: No   Estimated Blood Loss:       Quantitative Blood Loss:          Complications     none    Disposition     Mother to Mother Baby/Postpartum  in stable condition currently.  Baby to remains with mom  in stable condition currently.    Miah Jiang,  DAVID  11/15/24  13:30 EST

## 2024-11-15 NOTE — ANESTHESIA PROCEDURE NOTES
Labor Epidural      Patient reassessed immediately prior to procedure    Patient location during procedure: OB  Performed By  Anesthesiologist: Savanah Mendez DO  Preanesthetic Checklist  Completed: patient identified, IV checked, risks and benefits discussed, surgical consent, monitors and equipment checked, pre-op evaluation and timeout performed  Additional Notes  CSE performed using 25g Tommy  Prep:  Pt Position:sitting  Sterile Tech:cap, gloves, mask and sterile barrier  Prep:chlorhexidine gluconate and isopropyl alcohol  Monitoring:blood pressure monitoring  Epidural Block Procedure:  Approach:midline  Guidance:palpation technique  Location:L3-L4  Needle Type:Tuohy  Needle Gauge:17 G  Loss of Resistance Medium: air  Loss of Resistance: 6cm  Cath Depth at skin:13 cm  Paresthesia: none  Aspiration:negative  Test Dose:negative  Number of Attempts: 1  Post Assessment:  Dressing:occlusive dressing applied and secured with tape  Pt Tolerance:patient tolerated the procedure well with no apparent complications  Complications:no

## 2024-11-15 NOTE — PLAN OF CARE
Goal Outcome Evaluation:   Expected management of elective induction of labor.

## 2024-11-15 NOTE — PAYOR COMM NOTE
"Leona Lamas (29 y.o. Female)     From: Jeanette Cardoza LPN, Utilization Review  Phone #[phone]  Fax #[phone]    [employer] ID#[id number]     Delivery Information:  Vaginal Delivery 11/15/24 @ 13:13  Wt 3265 gms  Female  Apgars 7/9          Date of Birth   1995    Social Security Number       Address   [address] [place] [address]    Home Phone   [phone]    MRN   [id number]       Yazidism   Adventism    Marital Status                               Admission Date   11/15/24    Admission Type   Elective    Admitting Provider   Tresa Garcia MD    Attending Provider   Miah Jiang CNM    Department, Room/Bed   [facility] LABOR DELIVERY, N303/1       Discharge Date       Discharge Disposition       Discharge Destination                                 Attending Provider: Miah Jiang CNM    Allergies: Banana, Cephalosporins, Latex, Sulfa Antibiotics, Erythromycin, Cefdinir, Ciprofloxacin, Clindamycin/lincomycin, Pineapple    Isolation: None   Infection: None   Code Status: CPR    Ht: 157.5 cm (62\")   Wt: 113 kg (250 lb)    Admission Cmt: None   Principal Problem: None                  Active Insurance as of 11/15/2024       Primary Coverage       Payor Plan Insurance Group Employer/Plan Group    [employer] [place] [id number]       Payor Plan Address Payor Plan Phone Number Payor Plan Fax Number Effective Dates    [address] [address] [phone]  6/22/2020 - None Entered    [facility] [id number]         Subscriber Name Subscriber Birth Date Member ID       LEONA LAMAS 1995 [id number]                     Emergency Contacts        (Rel.) Home Phone Work Phone Mobile Phone    Jose Oliver (Grandparent) [phone] -- [phone]              Insurance Information                  [facility] MEDICAID Phone: [phone]    Subscriber: Leona Lamas Subscriber#: [id number]    Group#: [id number] " "Precert#: --    Authorization#: -- Effective Date: --             History & Physical        Apolinar, Miah BENDER CNM at 11/15/24 0854       Attestation signed by Lety Armas MD at 11/15/24 1000    I have reviewed this documentation and agree.                  [facility]  Obstetric History and Physical    Chief Complaint   Patient presents with    Scheduled Induction       Subjective    Patient is a 29 y.o. female  currently at 39w0d, who presents for induction of labor  for elective  with favorable cervix. She voices good fetal movement. She denies vaginal bleeding and leaking of fluid. She is comfortable with epidural.     Her prenatal care is complicated by  prior   desires .  Her previous obstetric/gynecological history is remarkable for  x1 with successful  x3 and obesity. Hx GDM with 3 previous pregnancies. Did not test with this pregnancy. HgbA1c 5.3. EFW 6-7 (66%) at 36 weeks.      The following portions of the patients history were reviewed and updated as appropriate: current medications, allergies, past medical history, past surgical history, past family history, past social history, and problem list .       Prenatal Information:   Maternal Prenatal Labs  Blood Type ABO Type   Date Value Ref Range Status   11/15/2024 O  Final      Rh Status RH type   Date Value Ref Range Status   11/15/2024 Positive  Final      Antibody Screen Antibody Screen   Date Value Ref Range Status   11/15/2024 Negative  Final      Gonnorhea No results found for: \"GCCX\"   Chlamydia No results found for: \"CLAMYDCU\"   RPR No results found for: \"RPR\"   Syphilis Antibody No results found for: \"SYPHILIS\"   Rubella No results found for: \"RUBELLAIGGIN\"   Hepatitis B Surface Antigen No results found for: \"HEPBSAG\"   HIV-1 Antibody No results found for: \"LABHIV1\"   Hepatitis C Antibody No results found for: \"HEPCAB\"   Rapid Urin Drug Screen No results found for: \"AMPMETHU\", \"BARBITSCNUR\", \"LABBENZSCN\", " "\"LABMETHSCN\", \"LABOPIASCN\", \"THCURSCR\", \"COCAINEUR\", \"AMPHETSCREEN\", \"PROPOXSCN\", \"BUPRENORSCNU\", \"METAMPSCNUR\", \"OXYCODONESCN\", \"TRICYCLICSCN\"   Group B Strep Culture Negative                 Past OB History:       OB History    Para Term  AB Living   11 4 3 1 6 4   SAB IAB Ectopic Molar Multiple Live Births   2 0 0 0 0 4      # Outcome Date GA Lbr Aniceto/2nd Weight Sex Type Anes PTL Lv   11 Current            10 Term 19 39w3d  2975 g (6 lb 8.9 oz) M Vag-Spont None N BAY      Name: NADINE LAMAS      Apgar1: 8  Apgar5: 9   9 2019           8 AB 18 8w0d          7 Term 16 37w0d  3714 g (8 lb 3 oz) F  EPI  BAY   6 Term 16 40w0d  3572 g (7 lb 14 oz) M  EPI  BAY   5 AB 01/01/15 8w0d          4  14 34w0d  2353 g (5 lb 3 oz) M CS-LTranv EPI  BAY      Complications: Fetal Intolerance   3 AB 14 8w0d          2 AB 13 8w0d          1 SAB 11 20w0d    Vag-Spont   ND       Past Medical History: Past Medical History:   Diagnosis Date    Abnormal Pap smear of cervix     Anxiety     Depression     Endometriosis     Gestational diabetes     w/ first 3 pregnancy    H/O LEEP 10/20/2021    Obesity     PCOS (polycystic ovarian syndrome)       Past Surgical History Past Surgical History:   Procedure Laterality Date     SECTION  2014    CHOLECYSTECTOMY  01/15/2018    ROOT CANAL  2023    tooth numbers 24, 25    TONSILLECTOMY        Family History: Family History   Problem Relation Age of Onset    Diabetes Mother     Hypertension Mother     Migraines Mother     Neuropathy Mother     Arthritis Mother     Diabetes Father     Heart disease Father         chf    Heart attack Father     Heart disease Paternal Grandfather       Social History:  reports that she quit smoking about 5 years ago. Her smoking use included cigarettes. She started smoking about 9 years ago. She has a 1.1 pack-year smoking history. She has been exposed to tobacco " smoke. She has never used smokeless tobacco.   reports that she does not currently use alcohol.   reports that she does not currently use drugs after having used the following drugs: Marijuana.        REVIEW OF SYSTEMS             Reports fetal movement is normal             Denies leakage of amniotic fluid.             Denies vaginal bleeding             She reports Regular contractions, frequency: Every 3-4 minutes, intensity moderate             All other systems reviewed and are negative    Objective      Vital Signs Range for the last 24 hours  Temperature: Temp:  [97.5 °F (36.4 °C)-97.8 °F (36.6 °C)] 97.5 °F (36.4 °C)   Temp Source: Temp src: Oral   BP: BP: ()/(48-87) 96/51   Pulse: Heart Rate:  [] 96   Respirations: Resp:  [15] 15   SPO2:     O2 Amount (l/min):     O2 Devices Device (Oxygen Therapy): room air   Weight: Weight:  [113 kg (250 lb)] 113 kg (250 lb)       Presentation: vertex   Cervix: Exam by:  PENNY Jiang CNM   Dilation:  4-5   Effacement: Cervical Effacement: 90   Station:  -2       Fetal Heart Rate Assessment   Method: Fetal HR Assessment Method: Telemetry/Wireless Fetal HR Monitor   Beats/min: Fetal HR (beats/min): 125   Baseline: Fetal HR Baseline: normal range   Varibility: Fetal HR Variability: moderate (amplitude range 6 to 25 bpm)   Accels: Fetal HR Accelerations: greater than/equal to 15 bpm, lasting at least 15 seconds   Decels: Fetal HR Decelerations: absent   Tracing Category:  1    NST: REACTIVE     Uterine Assessment   Method: Method: telemetry   Frequency (min): Contraction Frequency (Minutes): 4-5   Ctx Count in 10 min:     Duration:     Intensity: Contraction Intensity: mild by palpation   Intensity by IUPC:     Resting Tone: Uterine Resting Tone: soft by palpation   Resting Tone by IUPC:     Catherine Units:             Constitutional:  Well developed, well nourished, no acute distress, well-groomed.   Respiratory:  Resp e/e/u, normal breath sounds.   Cardiovascular:   Normal rate and rhythm, no murmurs.   Gastrointestinal:  Soft, gravid, nontender.  Uterus: Soft, nontender. Fundus appropriate for dates.  Neurologic:  Alert & oriented x 3,  no focal deficits noted.   Psychiatric:  Speech and behavior appropriate.   Extremities: no cyanosis, clubbing or edema, no evidence of DVT.        Labs:  Lab Results   Component Value Date    WBC 11.00 (H) 11/15/2024    HGB 10.4 (L) 11/15/2024    HCT 31.1 (L) 11/15/2024    MCV 89.1 11/15/2024     11/15/2024    CREATININE 0.52 (L) 11/10/2024    URICACID 6.6 (H) 2019    AST 15 11/10/2024    ALT 8 11/10/2024     2019     Results from last 7 days   Lab Units 11/15/24  0126   ABO TYPING  O   RH TYPING  Positive   ANTIBODY SCREEN  Negative           Assessment & Plan      39 weeks gestation of pregnancy        Assessment:  1.  Intrauterine pregnancy at 39w0d weeks gestation with reactive fetal status.    2.   induction of labor  for elective  with favorable cervix  3.  Obstetrical history is remarkable for  x1.  4.  Successful  x3  5.  Obesity  6.  GBS status: Negative    Plan:  1.Oxytocin induction  2. AROM, clear fluid  3. Plan of care has been reviewed with patient and questions answered.  4.  Risks, benefits of treatment plan have been discussed.  5.  All questions have been answered.        Miah Jiang CNM  11/15/2024  08:54 EST    Electronically signed by Lety Armas MD at 11/15/24 1000       Facility-Administered Medications as of 11/15/2024   Medication Dose Route Frequency Provider Last Rate Last Admin    ! Epidural   Not Applicable Daily Omar Hansen, MANJIT        acetaminophen (TYLENOL) tablet 650 mg  650 mg Oral Q4H PRN Miah Jiang CNM        diphenhydrAMINE (BENADRYL) injection 12.5 mg  12.5 mg Intravenous Q8H PRN Savanah Mendez,         ePHEDrine Sulfate (Pressors) 5 MG/ML injection 10 mg  10 mg Intravenous Q10 Min PRN Savanah Mendez, DO        famotidine  (PEPCID) injection 20 mg  20 mg Intravenous Q12H PRN ApolinarMiah hernandez CNM        Or    famotidine (PEPCID) tablet 20 mg  20 mg Oral Q12H PRN ApolinarMiah CNM        famotidine (PEPCID) injection 20 mg  20 mg Intravenous Once PRN Nella Abeba, Savanah A, DO        fentaNYL citrate (PF) (SUBLIMAZE) injection 100 mcg  100 mcg Intravenous Q1H PRN ApolinarMiah hernandez CNM        fentaNYL citrate (PF) (SUBLIMAZE) injection 50 mcg  50 mcg Intravenous Q1H PRN ApolinarMiah hernandez CNM        lactated ringers bolus 1,000 mL  1,000 mL Intravenous Once PRN ApoilnarMiah hernandez CNM        lactated ringers bolus 125 mL  125 mL Intravenous PRN Micky Posada MD 5.21 mL/hr at 11/15/24 0709 Rate Change at 11/15/24 0709    lactated ringers infusion  30 mL/hr Intravenous Continuous Miah Jiang CNM 30 mL/hr at 11/15/24 0608 30 mL/hr at 11/15/24 0608    lidocaine PF 1% (XYLOCAINE) injection 0.5 mL  0.5 mL Intradermal Once PRN Miah Jiang CNM        metoclopramide (REGLAN) injection 10 mg  10 mg Intravenous Once PRN Nella Abeba, Savanah A, DO        mineral oil liquid 30 mL  30 mL Topical Once Miah Jiang CNM        ondansetron ODT (ZOFRAN-ODT) disintegrating tablet 4 mg  4 mg Oral Q6H PRN ApolinarMiah hernandez CNM        Or    ondansetron (ZOFRAN) injection 4 mg  4 mg Intravenous Q6H PRN Miah Jiang CNM        [COMPLETED] ondansetron (ZOFRAN) injection 4 mg  4 mg Intravenous Once PRN Nella Abeba, Savanah A, DO   4 mg at 11/15/24 0555    oxytocin (PITOCIN) 30 units in 0.9% sodium chloride 500 mL (premix)  2-20 alejandra-units/min Intravenous Titrated Miah Jiang  mL/hr at 11/15/24 1323 999 alejandra-units/min at 11/15/24 1323    ropivacaine (NAROPIN) 0.2 % injection  15 mL/hr Epidural Continuous Savanah Mendez DO   Stopped at 11/15/24 1324    Sod Citrate-Citric Acid (BICITRA) oral solution 30 mL  30 mL Oral Once Savanah Mendez DO        sodium chloride 0.9 % flush 10 mL  10 mL  Intravenous Q12H Apolinar, Iniko Z, CNM        sodium chloride 0.9 % flush 10 mL  10 mL Intravenous PRN Apolinar, Iniko Z, CNM        sodium chloride 0.9 % infusion 40 mL  40 mL Intravenous PRN Apolinar, Iniko Z, CNM        terbutaline (BRETHINE) injection 0.25 mg  0.25 mg Subcutaneous PRN Apolinar, Iniko Z, CNM         Orders (last 24 hrs)        Start     Ordered    11/15/24 1307  methylergonovine (METHERGINE) 0.2 MG/ML injection  - ADS Override Pull  Status:  Discontinued        Note to Pharmacy: Created by cabinet override    11/15/24 1307    11/15/24 1306  miSOPROStol (CYTOTEC) 200 MCG tablet  - ADS Override Pull  Status:  Discontinued        Note to Pharmacy: Created by cabinet override    11/15/24 1306    11/15/24 0900  ! Epidural  Daily         11/15/24 0502    11/15/24 0854  Admit To Obstetrics Inpatient  Once         11/15/24 0854    11/15/24 0730  lactated ringers bolus 125 mL  As Needed         11/15/24 0731    11/15/24 0600  ropivacaine (NAROPIN) 0.2 % injection  Continuous         11/15/24 0500    11/15/24 0600  Sod Citrate-Citric Acid (BICITRA) oral solution 30 mL  Once         11/15/24 0500    11/15/24 0500  oxytocin (PITOCIN) 30 units in 0.9% sodium chloride 500 mL (premix)  Titrated         11/15/24 0026    11/15/24 0500  Vital Signs Per Anesthesia Guidelines  Per Order Details        Comments: Every 3 Minutes x20 Minutes Following Epidural Dosing, Then Every 15 Minutes If Stable    11/15/24 0500    11/15/24 0500  Start IV with #16 or #18 gauge angiocath.  Once         11/15/24 0500    11/15/24 0500  Nurse or anesthesiologist to remain with patient for 15 minutes following dosing.  Until Discontinued         11/15/24 0500    11/15/24 0500  Facilitate maternal postion on side and maintain uterine displacement.  Until Discontinued         11/15/24 0500    11/15/24 0500  Consult anesthesia services prior to changing epidural infusion/rate.  Until Discontinued         11/15/24 0500    11/15/24 0459  ePHEDrine  Sulfate (Pressors) 5 MG/ML injection 10 mg  Every 10 Minutes PRN         11/15/24 0500    11/15/24 0459  metoclopramide (REGLAN) injection 10 mg  Once As Needed         11/15/24 0500    11/15/24 0459  ondansetron (ZOFRAN) injection 4 mg  Once As Needed         11/15/24 0500    11/15/24 0459  famotidine (PEPCID) injection 20 mg  Once As Needed         11/15/24 0500    11/15/24 0459  diphenhydrAMINE (BENADRYL) injection 12.5 mg  Every 8 Hours PRN         11/15/24 0500    11/15/24 0459  lactated ringers bolus 1,000 mL  As Needed,   Status:  Discontinued         11/15/24 0500    11/15/24 0444  ePHEDrine Sulfate (Pressors) 5 MG/ML injection  - ADS Override Pull  Status:  Discontinued        Note to Pharmacy: Created by cabinet override    11/15/24 0444    11/15/24 0115  sodium chloride 0.9 % flush 10 mL  Every 12 Hours Scheduled         11/15/24 0026    11/15/24 0115  lactated ringers infusion  Continuous         11/15/24 0026    11/15/24 0115  mineral oil liquid 30 mL  Once         11/15/24 0026    11/15/24 0027  Code Status and Medical Interventions: CPR (Attempt to Resuscitate); Full Support  Continuous         11/15/24 0026    11/15/24 0027  Obtain informed consent  Once         11/15/24 0026    11/15/24 0027  Vital Signs Per Hospital Policy  Per Hospital Policy         11/15/24 0026    11/15/24 0027  Mini- prep prior to delivery  Once         11/15/24 0026    11/15/24 0027  Continuous Fetal Monitoring With NST on Admission and Prior to Initiation of Oxytocin.  Per Order Details        Comments: Continuous Fetal Monitoring With NST on Admission & Prior to Initiation of Oxytocin.    11/15/24 0026    11/15/24 0027  External Uterine Contraction Monitoring  Per Hospital Policy         11/15/24 0026    11/15/24 0027  Notify Physician (specified)  Until Discontinued         11/15/24 0026    11/15/24 0027  Notify physician for tachysystole (per hospital algorithm)  Until Discontinued         11/15/24 0026    11/15/24 0027   Notify physician if membranes ruptured, bleeding greater than 1 pad an hour, fetal heart tone abnormality, and severe pain  Until Discontinued         11/15/24 0026    11/15/24 0027  Bed Rest with Bathroom Privileges  Once         11/15/24 0026    11/15/24 0027  Initiate Group Beta Strep (GBS) Prophylaxis Protocol, If Criteria Met  Continuous        Comments: NO TREATMENT RECOMMENDED IF: 1)  Maternal GBS status known negative 2)  Scheduled  birth with intact membranes, not in labor.  3 ) Maternal GBS unknown, no risk factors.   TREAT WITH ANTIBIOTICS IF:  1)  Maternal GBS status is known postive.  2)  Maternal GBS status unknown with these risk factors:  a)  Previous infant affected by GBS infection.  b)  GBS urinary tract infection (UTI) or bacteruria during pregnancy  c)  Unexplained maternal fever in labor (greater than or equal to 100.4F or 38.0C)  d)  Prolonged rupture of the membranes greater than or equal to 18 hours.  e)  Gestational age less than 37 weeks.    11/15/24 0026    11/15/24 0027  Benson Bulb Cervical Ripening Without Infusion  Once        Comments: Have Laborist Place Cervical Benson Without Infusion.    If Unable to Place Benson, Start Low Dose Pitocin Drip Overnight, Then Increase Per Protocol at 0500 Next Morning    11/15/24 0026    11/15/24 0027  CBC (No Diff)  Once         11/15/24 0026    11/15/24 0027  Treponema pallidum AB w/Reflex RPR  Once         11/15/24 0026    11/15/24 0027  Type & Screen  Once         11/15/24 0026    11/15/24 0027  Insert Peripheral IV  Once         11/15/24 0026    11/15/24 0027  Saline Lock & Maintain IV Access  Continuous         11/15/24 0026    11/15/24 0027  VTE Prophylaxis Not Indicated: Low Risk; Obesity - BMI >30 (1)  Once         11/15/24 0026    11/15/24 0027  Diet: Liquid; Clear Liquid; Fluid Consistency: Thin (IDDSI 0)  Diet Effective Now         11/15/24 0026    11/15/24 0026  sodium chloride 0.9 % flush 10 mL  As Needed         11/15/24 0026     "11/15/24 0026  sodium chloride 0.9 % infusion 40 mL  As Needed         11/15/24 0026    11/15/24 0026  lidocaine PF 1% (XYLOCAINE) injection 0.5 mL  Once As Needed         11/15/24 0026    11/15/24 0026  lactated ringers bolus 1,000 mL  Once As Needed         11/15/24 0026    11/15/24 0026  acetaminophen (TYLENOL) tablet 650 mg  Every 4 Hours PRN         11/15/24 0026    11/15/24 0026  ondansetron ODT (ZOFRAN-ODT) disintegrating tablet 4 mg  Every 6 Hours PRN        Placed in \"Or\" Linked Group    11/15/24 0026    11/15/24 0026  ondansetron (ZOFRAN) injection 4 mg  Every 6 Hours PRN        Placed in \"Or\" Linked Group    11/15/24 0026    11/15/24 0026  terbutaline (BRETHINE) injection 0.25 mg  As Needed         11/15/24 0026    11/15/24 0026  famotidine (PEPCID) injection 20 mg  Every 12 Hours PRN        Placed in \"Or\" Linked Group    11/15/24 0026    11/15/24 0026  famotidine (PEPCID) tablet 20 mg  Every 12 Hours PRN        Placed in \"Or\" Linked Group    11/15/24 0026    11/15/24 0026  fentaNYL citrate (PF) (SUBLIMAZE) injection 50 mcg  Every 1 Hour PRN         11/15/24 0026    11/15/24 0026  fentaNYL citrate (PF) (SUBLIMAZE) injection 100 mcg  Every 1 Hour PRN         11/15/24 0026    Unscheduled  Position Change - For Intra-Uterine Resusitation for Hypertonus, HyperStimulation or Non-Reassuring Fetal Status  As Needed       11/15/24 0026    Signed and Held  Nurse may remove epidural catheter after delivery.  Until Discontinued         Signed and Held    Signed and Held  Transfer to postpartum when discharge criteria met.  Until Discontinued         Signed and Held    Signed and Held  Transfer Patient  Once         Signed and Held    Signed and Held  Code Status and Medical Interventions: CPR (Attempt to Resuscitate); Full  Continuous         Signed and Held    Signed and Held  Vital Signs Per hospital policy  Per Hospital Policy         Signed and Held    Signed and Held  Notify Physician  Until Discontinued      "    Signed and Held    Signed and Held  Up Ad Kiki  Until Discontinued         Signed and Held    Signed and Held  Ambulate Patient  Every Shift       Signed and Held    Signed and Held  Diet: Regular/House; Fluid Consistency: Thin (IDDSI 0)  Diet Effective Now         Signed and Held    Signed and Held  Advance Diet As Tolerated -Regular  Until Discontinued         Signed and Held    Signed and Held  Fundal and Lochia Check  Per Hospital Policy        Comments: Q 15 min x 4, Q 30 min x 2, then Q Shift    Signed and Held    Signed and Held  RN to Assess Rh Status & Place RhIG Evaluation Order if Indicated  Continuous         Signed and Held    Signed and Held  Bladder Assessment  Per Order Details        Comments: Postpartum 1) Upon Admission to Unit & Every 4 Hours PRN Until Voiding. 2) Out of Bed to Void in 8 Hours.    Signed and Held    Signed and Held  Straight Cath  Per Order Details        Comments: Postpartum: If Distended & Unable to Void, May Repeat Once.    Signed and Held    Signed and Held  Indwelling Urinary Catheter  Per Order Details        Comments: Postpartum : After Straight Cathed x2 or if Greater Than 1000mL Residual, Insert Indwelling Urinary Catheter Until Further MD Order.    Signed and Held    Signed and Held  Apply Ice to Perineum  As Needed        Comments: For 20 min q 2 hrs    Signed and Held    Signed and Held  Waffle Cushion  As Needed        Comments: For perineal discomfort    Signed and Held    Signed and Held  Donut Ring  As Needed        Comments: For perineal pain    Signed and Held    Signed and Held  Sitz Bath  3 Times Daily        Comments: PRN    Signed and Held    Signed and Held  Kpad  As Needed        Comments: For pain    Signed and Held    Signed and Held  Warm compress  As Needed         Signed and Held    Signed and Held  Breast pump to bed  Once         Signed and Held    Signed and Held  Apply ace wrap, tight bra, or binder  As Needed         Signed and Held    Signed  "and Held  Apply ice packs  As Needed         Signed and Held    Signed and Held  If indicated -- Please administer RH Immunoglobulin based on results of cord blood evaluation and fetal screen lab tests, pharmacy to dispense  Per Order Details        Comments: See Process Instructions For Reference Range Details.    Signed and Held    Signed and Held  CBC & Differential  Timed        Comments: Postpartum Day 1      Signed and Held    Signed and Held  sodium chloride 0.9 % flush 1-10 mL  As Needed         Signed and Held    Signed and Held  oxytocin (PITOCIN) 30 units in 0.9% sodium chloride 500 mL (premix)  Once As Needed         Signed and Held    Signed and Held  ibuprofen (ADVIL,MOTRIN) tablet 600 mg  Every 6 Hours PRN         Signed and Held    Signed and Held  acetaminophen (TYLENOL) tablet 650 mg  Every 6 Hours PRN         Signed and Held    Signed and Held  HYDROcodone-acetaminophen (NORCO) 5-325 MG per tablet 1 tablet  Every 4 Hours PRN        Placed in \"Or\" Linked Group    Signed and Held    Signed and Held  HYDROcodone-acetaminophen (NORCO)  MG per tablet 1 tablet  Every 4 Hours PRN        Placed in \"Or\" Linked Group    Signed and Held    Signed and Held  bisacodyl (DULCOLAX) suppository 10 mg  Daily PRN         Signed and Held    Signed and Held  magnesium hydroxide (MILK OF MAGNESIA) suspension 10 mL  Daily PRN         Signed and Held    Signed and Held  docusate sodium (COLACE) capsule 100 mg  2 Times Daily         Signed and Held    Signed and Held  lanolin topical 1 Application  Every 1 Hour PRN         Signed and Held    Signed and Held  benzocaine-menthol (DERMOPLAST) 20-0.5 % topical spray  As Needed         Signed and Held    Signed and Held  witch hazel-glycerin (TUCKS) pad  As Needed         Signed and Held    Signed and Held  Hydrocortisone (Perianal) (ANUSOL-HC) 2.5 % rectal cream 1 Application  As Needed         Signed and Held    Signed and Held  benzocaine (AMERICAINE) 20 % rectal " ointment 1 Application  As Needed         Signed and Held    Signed and Held  ondansetron (ZOFRAN) injection 4 mg  Every 6 Hours PRN         Signed and Held    Signed and Held  VTE Prophylaxis Not Indicated: Low Risk; Obesity - BMI >30 (1)  Once         Signed and Held    Signed and Held  prenatal vitamin tablet 1 tablet  Daily         Signed and Held                     Operative/Procedure Notes (last 24 hours)        Miah Jiang CNM at 11/15/24 1330           [facility]   Vaginal Delivery Note    Patient Name: Leona Murillo  : 1995  MRN: [id number]    Date of Delivery: 11/15/2024    Diagnosis     Pre & Post-Delivery:  Intrauterine pregnancy at 39w0d  Labor status: Induced Onset of Labor     (normal spontaneous vaginal delivery)             Problem List    Transfer to Postpartum     Review the Delivery Report for details.     Delivery     Delivery: , Spontaneous    YOB: 2025   Time of Birth:  Gestational Age 1:13 PM  39w0d     Anesthesia: Epidural    Delivering clinician: Miah Jiang   Forceps?   No   Vacuum? No    Shoulder dystocia present: No        Delivery narrative:  Patient at 39w0d pushed to deliver a viable female infant over an intact perineum with epidural anesthesia. Infant's head delivered and a loose nuchal x1 was found and easily reduced over fetal head. The anterior shoulder and body delivered atraumatically. Vigorous infant was placed on mom's abdomen where the cord was allowed to stop pulsating and was clamped x2 and cut by FOB. Placenta delivered spontaneously and appeared to be intact. Perineum was inspected and found to be intact. EBL 50ml. Mother and infant stable, bonding.         Infant     Findings: female infant     Infant observations: Weight: 3265 g (7 lb 3.2 oz)  Length: 19.5 in  Observations/Comments:        Apgars: 7  @ 1 minute /    9  @ 5 minutes   Infant Name: Talia     Placenta & Cord         Placenta delivered  Spontaneous at    11/15/2024  1:22 PM    Cord: 3 vessels present.   Nuchal Cord?  yes; Number of nuchal loops present:   1   Cord blood obtained: Yes   Cord gases obtained:  No             Repair     Episiotomy: None        Lacerations: No   Estimated Blood Loss:       Quantitative Blood Loss:          Complications     none    Disposition     Mother to Mother Baby/Postpartum  in stable condition currently.  Baby to remains with mom  in stable condition currently.    Miah Jiang CNM  11/15/24  13:30 EST          Electronically signed by Miah Jiang CNM at 11/15/24 1333       Physician Progress Notes (last 24 hours)  Notes from 11/14/24 1423 through 11/15/24 1423   No notes of this type exist for this encounter.

## 2024-11-15 NOTE — PLAN OF CARE
Goal Outcome Evaluation:  Plan of Care Reviewed With: patient        Progress: no change  Outcome Evaluation: U/u small lochia, perineum intact

## 2024-11-15 NOTE — LACTATION NOTE
11/15/24 1617   Maternal Information   Date of Referral 11/15/24   Person Making Referral lactation consultant   Maternal Reason for Referral breastfeeding currently   Infant Reason for Referral  infant   Maternal Assessment   Left Nipple Symptoms nontender   Right Nipple Symptoms nontender   Maternal Infant Feeding   Maternal Emotional State independent   Latch Assistance none needed   Milk Expression/Equipment   Breast Pump Type double electric, personal  (lansinoh hands free)   Equipment for Home Use breast pump ordered through insurance   Breast Pumping   Breast Pumping Interventions other (see comments)  (encouraged to pump fhor sort or missed feedings and with supplementation)   Lactation Referrals   Lactation Referrals outpatient lactation program   Outpatient Lactation Program Lactation Follow-up Date/Time as needed     Courtesy visit for newly postpartum couplet. MOB reports breastfeeding 1st child for 1 month, as he was a preemie; 2nd child for 3 months, but had latch issues r/t possible tongue tie and milk dried up when she became pregnant again; 3rd child for 9 months without difficulty; and 4th child for 1 month, when her supply dried up which she believes to be related to PCOS. Reports this baby latched well for 1 hour in recovery with no pain or discomfort. Educational handout provided and reviewed. Encouraged to call as needs arise.

## 2024-11-15 NOTE — PROGRESS NOTES
11/15/24  12:38 EST  Leona ESPINOZA [facility]ent      ASSESSMENTS: Leona is comfortable with epidural. She denies rectal pressure.     /72   Pulse 77   Temp 98 °F (36.7 °C) (Oral)   Resp 16   Wt 113 kg (250 lb)   Breastfeeding Yes   BMI 45.73 kg/m²     Fetal Heart Rate Assessment   Method: Fetal HR Assessment Method: external   Beats/min: Fetal HR (beats/min): 120   Baseline: Fetal HR Baseline: normal range   Varibility: Fetal HR Variability: moderate (amplitude range 6 to 25 bpm)   Accels: Fetal HR Accelerations: greater than/equal to 15 bpm   Decels: Fetal HR Decelerations: variable   Tracing Category:  2     Uterine Assessment   Method: Method: external tocotransducer   Frequency (min): Contraction Frequency (Minutes): 4   Ctx Count in 10 min:     Duration:     Intensity: Contraction Intensity: mild by palpation   Intensity by IUPC:     Resting Tone: Uterine Resting Tone: soft by palpation   Resting Tone by IUPC:     Rehan Units:                                Presentation: vertex   Cervix: Exam by: Method: sterile vaginal exam performed   Dilation: Cervical Dilation (cm): 5-6   Effacement: Cervical Effacement: 90   Station: Fetal Station: -1            Lab Results   Component Value Date    WBC 11.00 (H) 11/15/2024    HGB 10.4 (L) 11/15/2024    HCT 31.1 (L) 11/15/2024    MCV 89.1 11/15/2024     11/15/2024    CREATININE 0.52 (L) 11/10/2024    URICACID 6.6 (H) 11/18/2019    AST 15 11/10/2024    ALT 8 11/10/2024     11/18/2019     Results from last 7 days   Lab Units 11/15/24  0126   ABO TYPING  O   RH TYPING  Positive   ANTIBODY SCREEN  Negative       PLAN: Continue PPP. Recheck cervix in 2 hours and as indicated by maternal-fetal status.     Miah Jiang CNM  12:38 EST  11/15/24

## 2024-11-15 NOTE — ANESTHESIA PREPROCEDURE EVALUATION
Anesthesia Evaluation     Patient summary reviewed and Nursing notes reviewed                Airway   Mallampati: II  TM distance: >3 FB  Neck ROM: full  Possible difficult intubation  Dental - normal exam     Pulmonary - negative pulmonary ROS   Cardiovascular - negative cardio ROS        Neuro/Psych  (+) psychiatric history Anxiety and Depression  GI/Hepatic/Renal/Endo    (+) obesity, morbid obesity, diabetes mellitus    Musculoskeletal     (+) back pain  Abdominal   (+) obese   Substance History - negative use     OB/GYN    (+) Pregnant        Other - negative ROS                     Anesthesia Plan    ASA 3     epidural       Anesthetic plan, risks, benefits, and alternatives have been provided, discussed and informed consent has been obtained with: patient.    CODE STATUS:    Level Of Support Discussed With: Patient  Code Status (Patient has no pulse and is not breathing): CPR (Attempt to Resuscitate)  Medical Interventions (Patient has pulse or is breathing): Full Support

## 2024-11-16 PROBLEM — O34.219 VBAC, DELIVERED, CURRENT HOSPITALIZATION: Status: ACTIVE | Noted: 2024-11-16

## 2024-11-16 LAB
BASOPHILS # BLD AUTO: 0.05 10*3/MM3 (ref 0–0.2)
BASOPHILS NFR BLD AUTO: 0.5 % (ref 0–1.5)
DEPRECATED RDW RBC AUTO: 47.7 FL (ref 37–54)
EOSINOPHIL # BLD AUTO: 0.21 10*3/MM3 (ref 0–0.4)
EOSINOPHIL NFR BLD AUTO: 2.1 % (ref 0.3–6.2)
ERYTHROCYTE [DISTWIDTH] IN BLOOD BY AUTOMATED COUNT: 14 % (ref 12.3–15.4)
HCT VFR BLD AUTO: 30.3 % (ref 34–46.6)
HGB BLD-MCNC: 9.6 G/DL (ref 12–15.9)
IMM GRANULOCYTES # BLD AUTO: 0.1 10*3/MM3 (ref 0–0.05)
IMM GRANULOCYTES NFR BLD AUTO: 1 % (ref 0–0.5)
LYMPHOCYTES # BLD AUTO: 2.01 10*3/MM3 (ref 0.7–3.1)
LYMPHOCYTES NFR BLD AUTO: 20.4 % (ref 19.6–45.3)
MCH RBC QN AUTO: 29.6 PG (ref 26.6–33)
MCHC RBC AUTO-ENTMCNC: 31.7 G/DL (ref 31.5–35.7)
MCV RBC AUTO: 93.5 FL (ref 79–97)
MONOCYTES # BLD AUTO: 0.79 10*3/MM3 (ref 0.1–0.9)
MONOCYTES NFR BLD AUTO: 8 % (ref 5–12)
NEUTROPHILS NFR BLD AUTO: 6.67 10*3/MM3 (ref 1.7–7)
NEUTROPHILS NFR BLD AUTO: 68 % (ref 42.7–76)
NRBC BLD AUTO-RTO: 0 /100 WBC (ref 0–0.2)
PLATELET # BLD AUTO: 193 10*3/MM3 (ref 140–450)
PMV BLD AUTO: 11.1 FL (ref 6–12)
RBC # BLD AUTO: 3.24 10*6/MM3 (ref 3.77–5.28)
WBC NRBC COR # BLD AUTO: 9.83 10*3/MM3 (ref 3.4–10.8)

## 2024-11-16 PROCEDURE — 85025 COMPLETE CBC W/AUTO DIFF WBC: CPT | Performed by: ADVANCED PRACTICE MIDWIFE

## 2024-11-16 RX ADMIN — Medication 1 APPLICATION: at 03:39

## 2024-11-16 RX ADMIN — IBUPROFEN 600 MG: 600 TABLET, FILM COATED ORAL at 16:14

## 2024-11-16 RX ADMIN — DOCUSATE SODIUM 100 MG: 100 CAPSULE, LIQUID FILLED ORAL at 20:59

## 2024-11-16 RX ADMIN — DOCUSATE SODIUM 100 MG: 100 CAPSULE, LIQUID FILLED ORAL at 09:33

## 2024-11-16 NOTE — PLAN OF CARE
Goal Outcome Evaluation:  Plan of Care Reviewed With: patient, spouse        Progress: improving  Outcome Evaluation: Vitals WNL. Chief complaint this shift has been abdominal cramping. PRN Ibuprofen given X 1. Fundus has been U/U, firm, and midline. Rubra has been light.

## 2024-11-16 NOTE — PROGRESS NOTES
11/16/2024  PPD #1    Subjective   Leona feels well.  Patient describes her lochia as less than menses.  Pain is well controlled  Breast feeding       Objective   Temp: Temp:  [97.5 °F (36.4 °C)-98.5 °F (36.9 °C)] 98.3 °F (36.8 °C) Temp src: Oral   BP: BP: ()/(48-87) 106/55        Pulse: Heart Rate:  [] 77  RR: Resp:  [15-18] 18    General:  No acute distress   Abdomen: Fundus firm and beneath umbilicus   Pelvis: deferred     Lab Results   Component Value Date    WBC 11.00 (H) 11/15/2024    HGB 10.4 (L) 11/15/2024    HCT 31.1 (L) 11/15/2024    MCV 89.1 11/15/2024     11/15/2024    CREATININE 0.52 (L) 11/10/2024    URICACID 6.6 (H) 11/18/2019    AST 15 11/10/2024    ALT 8 11/10/2024     11/18/2019    HEPBSAG Non-Reactive 05/13/2024     Results from last 7 days   Lab Units 11/15/24  0126   ABO TYPING  O   RH TYPING  Positive   ANTIBODY SCREEN  Negative       Assessment  PPD# 1 after vaginal delivery    Plan  Supportive care, anticipate d/c in am.      This note has been electronically signed.    Halima Alvarez CNM  03:32 EST  November 16, 2024

## 2024-11-16 NOTE — LACTATION NOTE
11/16/24 1600   Maternal Information   Date of Referral 11/16/24   Person Making Referral nurse;patient   Maternal Reason for Referral latch difficulty;nipple symptoms;other (see comments)  (Mom said her nipples are tender.  Nurse indicated Mom wanted information on breastfeeding safety with taking Prozac.)   Infant Reason for Referral tight frenulum  (Baby's lingual frenulum is attached close to end of tonuge, and she cannot extend her tongue very far over her gumline.  Her tongue tends to heart-shape with extension.)   Maternal Assessment   Breast Size Issue none   Breast Shape Bilateral:;round   Breast Density Bilateral:;soft   Nipples Bilateral:;short  (soft shells were provided to help leigh nipples.)   Left Nipple Symptoms bruised;painful  (Mom's nipples are abraded/bruised at the tips. Nipple care was discussed.)   Maternal Infant Feeding   Maternal Emotional State anxious  (Mom concerned about painful latch.)   Infant Positioning clutch/football   Signs of Milk Transfer deep jaw excursions noted  (A very small amount of formula was syringed at the end of the nipple shield to encourage baby to suck.  Once latched for a few minutes, she didn't require further stimulation. Mom declined DBM.)   Pain with Feeding other (see comments)  (Mom said latch feels much better in football positioning with a nipple shield.)   Comfort Measures Before/During Feeding infant position adjusted;maternal position adjusted   Comfort Measures Following Feeding air-drying encouraged;expressed milk applied   Latch Assistance minimal assistance   Milk Expression/Equipment   Breast Pump Type double electric, personal;manual pump  (Mom said she has a home Lansinoh pump. She was also provided a manual pump to use, as needed.)   Breast Pumping   Breast Pumping Interventions other (see comments)  (Mom encouraged to use manual pump after breastfeeding if baby doesn't nurse well or if breastfeeding becomes too painful.)   Lactation  Referrals   Lactation Referrals outpatient lactation program     Mom was assisted with breastfeeding because her nipples have become very tender and damaged.  A nipple shield was used and soft shells were provided.  Mom indicted the nipple shield helped a lot with discomfort.  It was observed that baby's lingual frenulum is extremely tight and could be contributing to her nipple pain.  She was advised to follow-up in the outpatient lactation clinic.

## 2024-11-17 VITALS
RESPIRATION RATE: 16 BRPM | DIASTOLIC BLOOD PRESSURE: 55 MMHG | BODY MASS INDEX: 45.73 KG/M2 | TEMPERATURE: 98.2 F | HEART RATE: 73 BPM | SYSTOLIC BLOOD PRESSURE: 98 MMHG | WEIGHT: 250 LBS

## 2024-11-17 RX ORDER — IBUPROFEN 800 MG/1
800 TABLET, FILM COATED ORAL EVERY 8 HOURS PRN
Qty: 60 TABLET | Refills: 1 | Status: SHIPPED | OUTPATIENT
Start: 2024-11-17

## 2024-11-17 RX ORDER — PSEUDOEPHEDRINE HCL 30 MG
100 TABLET ORAL 2 TIMES DAILY PRN
Qty: 60 CAPSULE | Refills: 1 | Status: SHIPPED | OUTPATIENT
Start: 2024-11-17

## 2024-11-17 RX ORDER — FERROUS SULFATE 325(65) MG
325 TABLET ORAL
Qty: 30 TABLET | Refills: 1 | Status: SHIPPED | OUTPATIENT
Start: 2024-11-17

## 2024-11-17 RX ADMIN — PRENATAL VITAMINS-IRON FUMARATE 27 MG IRON-FOLIC ACID 0.8 MG TABLET 1 TABLET: at 08:13

## 2024-11-17 RX ADMIN — DOCUSATE SODIUM 100 MG: 100 CAPSULE, LIQUID FILLED ORAL at 08:13

## 2024-11-17 RX ADMIN — IBUPROFEN 600 MG: 600 TABLET, FILM COATED ORAL at 03:40

## 2024-11-17 NOTE — DISCHARGE SUMMARY
[facility]  Vaginal delivery discharge summary      Patient: Leona Murillo      MR#:[id number]  Admission  Diagnosis: Present on Admission:  **None**     Discharge Diagnosis: Active and Resolved Problems  Active Hospital Problems    Diagnosis  POA    , delivered, current hospitalization [O34.219]  Unknown     (normal spontaneous vaginal delivery) [O80]  Not Applicable      Resolved Hospital Problems    Diagnosis Date Resolved POA    39 weeks gestation of pregnancy [Z3A.39] 11/15/2024 Not Applicable            Date of Admission: 11/15/2024  Date of Discharge:  2024    Procedures:  , Spontaneous    11/15/2024   1:13 PM     Service:  Obstetrics    Hospital Course/Assessment:  Patient underwent vaginal delivery and remained in the hospital for 2 days.  During that time she remained afebrile and hemodynamically stable.  On the day of discharge, she was eating, ambulating and voiding without difficulty.  Fundus firm, lochia small, perineum intact. Breast and bottle feeding and infant is doing well.  She is in no acute distress and ready for discharge.    Vitals:    24 2315   BP: 139/76   Pulse: 74   Resp: 16   Temp: 97.3 °F (36.3 °C)         Labs:    Lab Results   Component Value Date    WBC 9.83 2024    HGB 9.6 (L) 2024    HCT 30.3 (L) 2024    MCV 93.5 2024     2024    CREATININE 0.52 (L) 11/10/2024    URICACID 6.6 (H) 2019    AST 15 11/10/2024    ALT 8 11/10/2024     2019     Results from last 7 days   Lab Units 11/15/24  0126   ABO TYPING  O   RH TYPING  Positive   ANTIBODY SCREEN  Negative          Discharge Medications        New Medications        Instructions Start Date   docusate sodium 100 MG capsule   100 mg, Oral, 2 Times Daily PRN      ferrous sulfate 325 (65 FE) MG tablet   325 mg, Oral, Daily With Breakfast      ibuprofen 800 MG tablet  Commonly known as: ADVIL,MOTRIN   800 mg, Oral, Every 8 Hours PRN             Continue  These Medications        Instructions Start Date   FLUoxetine 20 MG capsule  Commonly known as: PROzac   20 mg, Oral, Daily      Prenatal 27-1 27-1 MG tablet tablet   1 tablet, Daily                 Discharge Disposition:  To Home    Discharge Condition:  Stable    Discharge Diet: Regular    Activity at Discharge: Pelvic rest    Follow-up Appointments  Follow up with Miah Jiang in 6 weeks.     Halima Alvarez CNM  11/17/24  07:03 EST

## 2024-11-25 ENCOUNTER — PATIENT ROUNDING (BHMG ONLY) (OUTPATIENT)
Dept: FAMILY MEDICINE CLINIC | Facility: CLINIC | Age: 29
End: 2024-11-25
Payer: COMMERCIAL

## 2024-11-25 ENCOUNTER — TELEMEDICINE (OUTPATIENT)
Dept: FAMILY MEDICINE CLINIC | Facility: CLINIC | Age: 29
End: 2024-11-25
Payer: COMMERCIAL

## 2024-11-25 DIAGNOSIS — O99.340 DEPRESSION DURING PREGNANCY, ANTEPARTUM: Primary | ICD-10-CM

## 2024-11-25 DIAGNOSIS — F32.A DEPRESSION DURING PREGNANCY, ANTEPARTUM: Primary | ICD-10-CM

## 2024-11-25 PROCEDURE — 99213 OFFICE O/P EST LOW 20 MIN: CPT | Performed by: PHYSICIAN ASSISTANT

## 2024-11-25 PROCEDURE — 1125F AMNT PAIN NOTED PAIN PRSNT: CPT | Performed by: PHYSICIAN ASSISTANT

## 2024-11-25 NOTE — PROGRESS NOTES
Telehealth E-Visit      Date: 2024   Patient Name: Leona Murillo  : 1995   MRN: [id number]     Chief Complaint:  No chief complaint on file.      I have reviewed the E-Visit questionnaire and the patient's answers, my assessment and plan are listed below.     This provider is located at the [facility] (through [facility]), [address] [place] [address]. [id number] a secure IPICOhart Video Visit through [employer]. Patient is being seen remotely via telehealth at their home address in [place], and stated they are in a secure environment for this session. The patient's condition being diagnosed/treated is appropriate for telemedicine. The provider identified herself as well as her credentials. The patient, and/or patients guardian, consent to be seen remotely, and when consent is given they understand that the consent allows for patient identifiable information to be sent to a third party as needed. They may refuse to be seen remotely at any time. The electronic data is encrypted and password protected, and the patient and/or guardian has been advised of the potential risks to privacy not withstanding such measures.    You have chosen to receive care through a telehealth visit. Do you consent to use a video/audio connection for your medical care today? Yes    History of Present Illness: Leona Murillo is a 29 y.o. female who is here today to follow up with concerns about taking her Prozac.  She is currently one week post-partum and did not take her medication to the hospital with her when she went to deliver.  Now it is a week later and with the toils of having a new born at home and trying to nurse she has still only had one pill.  The pediatrcian was less than ecouraging when she discussed her taking prozac and nursing and encouraged her to pump and dump two hours after having the medicine.  She is already in short supply.  She would like to discuss the Pros and  cons of not taking Prozac.    Currently her mood is stable and she is mostly just fatigued due to lack of sleep.  She offers no new complaints and adds that she has good support from her  who just recently returned back to work.  History of Present Illness         Subjective      Review of Systems:   Review of Systems   Constitutional: Negative.    HENT: Negative.     Respiratory: Negative.     Cardiovascular: Negative.        I have reviewed and the following portions of the patient's history were updated as appropriate: past family history, past medical history, past social history, past surgical history and problem list.    Medications:     Current Outpatient Medications:     docusate sodium 100 MG capsule, Take 1 capsule by mouth 2 (Two) Times a Day As Needed for Constipation., Disp: 60 capsule, Rfl: 1    ferrous sulfate 325 (65 FE) MG tablet, Take 1 tablet by mouth Daily With Breakfast., Disp: 30 tablet, Rfl: 1    FLUoxetine (PROzac) 20 MG capsule, Take 1 capsule by mouth once daily, Disp: 90 capsule, Rfl: 0    ibuprofen (ADVIL,MOTRIN) 800 MG tablet, Take 1 tablet by mouth Every 8 (Eight) Hours As Needed for Moderate Pain or Mild Pain (Take every 8 hours for 2-3 days and then as needed)., Disp: 60 tablet, Rfl: 1    Prenatal Vit-Fe Fumarate-FA (Prenatal 27-1) 27-1 MG tablet tablet, Take 1 tablet by mouth Daily., Disp: , Rfl:     Allergies:   Allergies   Allergen Reactions    Cephalosporins Angioedema, Hives and Unknown - Low Severity    Latex Anaphylaxis and Hives    Pineapple Anaphylaxis    Sulfa Antibiotics Hives and Unknown - Low Severity    Erythromycin Hives     All mycin drugs    Cefdinir Dizziness    Ciprofloxacin Hives    Clindamycin/Lincomycin Hives, Nausea Only, Dizziness and Confusion       Objective     Physical Exam:  Vital Signs: There were no vitals filed for this visit.  There is no height or weight on file to calculate BMI.    Physical Exam  Vitals and nursing note reviewed.    Constitutional:       General: She is not in acute distress.     Appearance: Normal appearance. She is not ill-appearing or toxic-appearing.   Eyes:      Extraocular Movements: Extraocular movements intact.      Pupils: Pupils are equal, round, and reactive to light.   Neurological:      General: No focal deficit present.      Mental Status: She is alert.      Cranial Nerves: No cranial nerve deficit.      Motor: No weakness.      Gait: Gait normal.   Psychiatric:         Mood and Affect: Mood normal.         Behavior: Behavior normal.         Thought Content: Thought content normal.         Judgment: Judgment normal.         Assessment / Plan      Assessment/Plan:   1. Depression during pregnancy, antepartum  The patient is concerned about taking Prozac and has been mood stable for many months.  I have cautioned her that this timing is bad for depression in the postpartum period but she she is being adequately observed by both her , the pediatrician and now me.  We have decided to tabled the discussion of Prozac for another 2 weeks as she begins to stabilize her nursing and we can discuss again whether or not she needs to resume her medication.  She has made a follow-up telehealth appointment in 2 weeks and we will reevaluate at that time.    Assessment & Plan         Follow Up:   No follow-ups on file.    15 minutes were spent reviewing the patient's questionnaire, formulating a treatment plan, and relaying information to the patient via Mgvt.    Patient or patient representative verbalized consent for the use of Ambient Listening during the visit with  Patience Vazquez PA-C for chart documentation. 11/25/2024  16:03 EST     Patience Vazquez PA-C  [facility]  11/25/24  15:57 EST

## 2024-11-25 NOTE — PROGRESS NOTES
A [employer] message has been sent to the patient for PATIENT  ROUNDING with [facility]

## 2024-11-26 ENCOUNTER — MATERNAL SCREENING (OUTPATIENT)
Dept: CALL CENTER | Facility: HOSPITAL | Age: 29
End: 2024-11-26
Payer: COMMERCIAL

## 2024-11-26 NOTE — OUTREACH NOTE
Maternal Screening Survey      Flowsheet Row Responses   Facility patient discharged from? [facility]   Attempt successful? No   Unsuccessful attempts Attempt 2              Lori WARD - Registered Nurse

## 2024-11-26 NOTE — OUTREACH NOTE
Maternal Screening Survey      Flowsheet Row Responses   Facility patient discharged from? [facility]   Attempt successful? No   Unsuccessful attempts Attempt 1              Lori WARD - Registered Nurse

## 2024-11-27 ENCOUNTER — MATERNAL SCREENING (OUTPATIENT)
Dept: CALL CENTER | Facility: HOSPITAL | Age: 29
End: 2024-11-27
Payer: COMMERCIAL

## 2024-11-27 NOTE — OUTREACH NOTE
Maternal Screening Survey      Flowsheet Row Responses   Facility patient discharged from? [facility]   Attempt successful? Yes   Call start time 1432   Call end time 1438   I have been able to laugh and see the funny side of things. 0   I have looked forward with enjoyment to things. 0   I have blamed myself unnecessarily when things went wrong. 1   I have been anxious or worried for no good reason. 0   I have felt scared or panicky for no good reason. 0   Things have been getting on top of me. 1   I have been so unhappy that I have had difficulty sleeping. 0   I have felt sad or miserable. 0   I have been so unhappy that I have been crying. 0   The thought of harming myself has occurred to me. 0   [place]  Depression Scale Total 2   Did any of your parents have problems with alcohol or drug use? No   Do any of your peers have problems with alcohol or drug use? No   Does your partner have problems with alcohol or drug use? No   Before you were pregnant did you have problems with alcohol or drug use? (past) No   In the past month, did you drink beer, wine, liquor or use any other drugs? (pregnancy) No   Maternal Screening call completed Yes   Call end time 1438              BELTRAN TOSCANO - Registered Nurse

## 2024-12-31 ENCOUNTER — TELEMEDICINE (OUTPATIENT)
Dept: FAMILY MEDICINE CLINIC | Facility: TELEHEALTH | Age: 29
End: 2024-12-31
Payer: COMMERCIAL

## 2024-12-31 DIAGNOSIS — O91.23 MASTITIS ASSOCIATED WITH LACTATION: Primary | ICD-10-CM

## 2024-12-31 RX ORDER — AMOXICILLIN 875 MG/1
875 TABLET, COATED ORAL 2 TIMES DAILY
Qty: 20 TABLET | Refills: 0 | Status: SHIPPED | OUTPATIENT
Start: 2024-12-31 | End: 2025-01-10

## 2025-01-01 NOTE — PROGRESS NOTES
You have chosen to receive care through a telehealth visit.  Do you consent to use a video/audio connection for your medical care today? Yes     CHIEF COMPLAINT  No chief complaint on file.        HPI  Leona Murillo is a 29 y.o. female  presents with complaint of right breast pain and warm to touch.  She is currently pumping her breast milk and states that symptoms started a cuple of days ago.    Review of Systems   Skin:         Right breast pain       Past Medical History:   Diagnosis Date    Abnormal Pap smear of cervix     Anxiety     Depression     Endometriosis     Gestational diabetes     w/ first 3 pregnancy    H/O LEEP 10/20/2021    Obesity     PCOS (polycystic ovarian syndrome)        Family History   Problem Relation Age of Onset    Diabetes Mother     Hypertension Mother     Migraines Mother     Neuropathy Mother     Arthritis Mother     Diabetes Father     Heart disease Father         chf    Heart attack Father     Heart disease Paternal Grandfather        Social History     Socioeconomic History    Marital status:    Tobacco Use    Smoking status: Former     Current packs/day: 0.00     Average packs/day: 0.3 packs/day for 4.3 years (1.1 ttl pk-yrs)     Types: Cigarettes     Start date:      Quit date: 2019     Years since quittin.7     Passive exposure: Past    Smokeless tobacco: Never   Vaping Use    Vaping status: Never Used   Substance and Sexual Activity    Alcohol use: Not Currently     Comment: Socially     Drug use: Not Currently     Types: Marijuana     Comment: reactional    Sexual activity: Yes     Partners: Male     Birth control/protection: None       Leona Murillo  reports that she quit smoking about 5 years ago. Her smoking use included cigarettes. She started smoking about 10 years ago. She has a 1.1 pack-year smoking history. She has been exposed to tobacco smoke. She has never used smokeless tobacco.     There were no vitals taken for this visit.    PHYSICAL  EXAM  Physical Exam   Constitutional: She appears well-developed and well-nourished.   HENT:   Head: Normocephalic.   Eyes: Pupils are equal, round, and reactive to light.   Pulmonary/Chest: Effort normal.   Musculoskeletal: Normal range of motion.   Neurological: She is alert.   Skin:   Right breast pain and swelling   Psych[facility]: She has a normal mood and affect.       Results for orders placed or performed during the hospital encounter of 11/15/24   CBC (No Diff)    Collection Time: 11/15/24  1:26 AM    Specimen: Blood   Result Value Ref Range    WBC 11.00 (H) 3.40 - 10.80 10*3/mm3    RBC 3.49 (L) 3.77 - 5.28 10*6/mm3    Hemoglobin 10.4 (L) 12.0 - 15.9 g/dL    Hematocrit 31.1 (L) 34.0 - 46.6 %    MCV 89.1 79.0 - 97.0 fL    MCH 29.8 26.6 - 33.0 pg    MCHC 33.4 31.5 - 35.7 g/dL    RDW 13.8 12.3 - 15.4 %    RDW-SD 44.4 37.0 - 54.0 fl    MPV 10.8 6.0 - 12.0 fL    Platelets 232 140 - 450 10*3/mm3   Treponema pallidum AB w/Reflex RPR    Collection Time: 11/15/24  1:26 AM    Specimen: Blood   Result Value Ref Range    Treponemal AB Total Non-Reactive Non-Reactive   Type & Screen    Collection Time: 11/15/24  1:26 AM    Specimen: Blood   Result Value Ref Range    ABO Type O     RH type Positive     Antibody Screen Negative     T&S Expiration Date 11/18/2024 11:59:59 PM    CBC Auto Differential    Collection Time: 11/16/24  9:49 AM    Specimen: Blood   Result Value Ref Range    WBC 9.83 3.40 - 10.80 10*3/mm3    RBC 3.24 (L) 3.77 - 5.28 10*6/mm3    Hemoglobin 9.6 (L) 12.0 - 15.9 g/dL    Hematocrit 30.3 (L) 34.0 - 46.6 %    MCV 93.5 79.0 - 97.0 fL    MCH 29.6 26.6 - 33.0 pg    MCHC 31.7 31.5 - 35.7 g/dL    RDW 14.0 12.3 - 15.4 %    RDW-SD 47.7 37.0 - 54.0 fl    MPV 11.1 6.0 - 12.0 fL    Platelets 193 140 - 450 10*3/mm3    Neutrophil % 68.0 42.7 - 76.0 %    Lymphocyte % 20.4 19.6 - 45.3 %    Monocyte % 8.0 5.0 - 12.0 %    Eosinophil % 2.1 0.3 - 6.2 %    Basophil % 0.5 0.0 - 1.5 %    Immature Grans % 1.0 (H) 0.0 - 0.5 %     Neutrophils, Absolute 6.67 1.70 - 7.00 10*3/mm3    Lymphocytes, Absolute 2.01 0.70 - 3.10 10*3/mm3    Monocytes, Absolute 0.79 0.10 - 0.90 10*3/mm3    Eosinophils, Absolute 0.21 0.00 - 0.40 10*3/mm3    Basophils, Absolute 0.05 0.00 - 0.20 10*3/mm3    Immature Grans, Absolute 0.10 (H) 0.00 - 0.05 10*3/mm3    nRBC 0.0 0.0 - 0.2 /100 WBC       Diagnoses and all orders for this visit:    1. Mastitis associated with lactation (Primary)  -     amoxicillin (AMOXIL) 875 MG tablet; Take 1 tablet by mouth 2 (Two) Times a Day for 10 days.  Dispense: 20 tablet; Refill: 0          FOLLOW-UP  As discussed during visit with PCP/[facility] if no improvement or Urgent Care/Emergency Department if worsening of symptoms    Patient verbalizes understanding of medication dosage, comfort measures, instructions for treatment and follow-up.    Ingrid Reza, ARAM  12/31/2024  22:01 EST    Mode of Visit: Video  Location of patient: -HOME-  Location of provider: +HOME+  You have chosen to receive care through a telehealth visit.  The patient has signed the video visit consent form.  The visit included audio and video interaction. No technical issues occurred during this visit.      Note Disclaimer: At [facility], we believe that sharing information builds trust and better   relationships. You are receiving this note because you recently visited [facility]. It is possible you   will see health information before a provider has talked with you about it. This kind of information can   be easy to misunderstand. To help you fully understand what it means for your health, we urge you to   discuss this note with your provider.

## 2025-05-25 ENCOUNTER — TELEMEDICINE (OUTPATIENT)
Dept: FAMILY MEDICINE CLINIC | Facility: TELEHEALTH | Age: 30
End: 2025-05-25
Payer: COMMERCIAL

## 2025-05-25 DIAGNOSIS — J02.0 STREP THROAT: Primary | ICD-10-CM

## 2025-05-25 NOTE — PROGRESS NOTES
You have chosen to receive care through a telehealth visit.  Do you consent to use a video/audio connection for your medical care today? Yes     Patient or patient representative verbalized consent for the use of Ambient Listening during the visit with  ARAM Serrato for chart documentation. 2025  11:34 EDT    CHIEF COMPLAINT  No chief complaint on file.        HPI  History of Present Illness  The patient is a 29-year-old female who presents via virtual visit with complaints of a sore throat.    She reports experiencing pharyngeal discomfort, characterized by tenderness and difficulty in swallowing. She suspects that her symptoms may be related to an infection, as her children have recently tested positive for an unspecified condition and have been frequently coughing in her presence. She is currently breastfeeding.       Review of Systems  See HPI    Past Medical History:   Diagnosis Date    Abnormal Pap smear of cervix     Anxiety     Depression     Endometriosis     Gestational diabetes     w/ first 3 pregnancy    H/O LEEP 10/20/2021    Obesity     PCOS (polycystic ovarian syndrome)        Family History   Problem Relation Age of Onset    Diabetes Mother     Hypertension Mother     Migraines Mother     Neuropathy Mother     Arthritis Mother     Diabetes Father     Heart disease Father         chf    Heart attack Father     Heart disease Paternal Grandfather        Social History     Socioeconomic History    Marital status:    Tobacco Use    Smoking status: Former     Current packs/day: 0.00     Average packs/day: 0.3 packs/day for 4.3 years (1.1 ttl pk-yrs)     Types: Cigarettes     Start date:      Quit date: 2019     Years since quittin.0     Passive exposure: Past    Smokeless tobacco: Never   Vaping Use    Vaping status: Never Used   Substance and Sexual Activity    Alcohol use: Not Currently     Comment: Socially     Drug use: Not Currently     Types: Marijuana     Comment:  reactional    Sexual activity: Yes     Partners: Male     Birth control/protection: None       Leona Murillo  reports that she quit smoking about 6 years ago. Her smoking use included cigarettes. She started smoking about 10 years ago. She has a 1.1 pack-year smoking history. She has been exposed to tobacco smoke. She has never used smokeless tobacco.             There were no vitals taken for this visit.    PHYSICAL EXAM  Physical Exam   Constitutional: She is oriented to person, place, and time. She appears well-developed and well-nourished. She does not have a sickly appearance. She does not appear ill.   HENT:   Head: Normocephalic and atraumatic.   Pulmonary/Chest: Effort normal.  No respiratory distress.  Lymphadenopathy:        Right cervical: Anterior cervical adenopathy present.        Left cervical: Anterior cervical adenopathy present.   Neurological: She is alert and oriented to person, place, and time.           Diagnoses and all orders for this visit:    1. Strep throat (Primary)  -     amoxicillin-clavulanate (AUGMENTIN) 875-125 MG per tablet; Take 1 tablet by mouth 2 (Two) Times a Day for 10 days.  Dispense: 20 tablet; Refill: 0    --take medications as prescribed  --increase fluids, rest as needed, tylenol or ibuprofen for pain  --f/u in 5-7 days if no improvement      Assessment & Plan  1. Pharyngitis.  She reports a sore throat, tenderness, and difficulty swallowing. Her children have tested positive for an unspecified illness, and she has been exposed to their cough. She is currently breastfeeding. A prescription for Augmentin 875 mg twice daily for 10 days has been issued to [facility] in [place] on [address]         FOLLOW-UP  As discussed during visit with PCP/[facility] if no improvement or Urgent Care/Emergency Department if worsening of symptoms    Patient verbalizes understanding of medication dosage, comfort measures, instructions for treatment and follow-up.    Maryjo Dai,  APRN  05/25/2025  11:34 EDT    Mode of Visit: Video  Location of patient: -HOME-  Location of provider: +HOME+  You have chosen to receive care through a telehealth visit.  The patient has signed the video visit consent form.  The visit included audio and video interaction. No technical issues occurred during this visit.    The use of a video visit has been reviewed with the patient and verbal informed consent has been obtained. Myself and Leona Murillo     participated in this visit. The patient is located in [address] [place] [address]  I am located in [place], [place]. [employer] and [employer] were utilized. I spent 1 minutes in the patient's chart for this visit.      Note Disclaimer: At [facility], we believe that sharing information builds trust and better   relationships. You are receiving this note because you recently visited [facility]. It is possible you   will see health information before a provider has talked with you about it. This kind of information can   be easy to misunderstand. To help you fully understand what it means for your health, we urge you to   discuss this note with your provider.

## 2025-06-09 ENCOUNTER — OFFICE VISIT (OUTPATIENT)
Dept: FAMILY MEDICINE CLINIC | Facility: CLINIC | Age: 30
End: 2025-06-09
Payer: COMMERCIAL

## 2025-06-09 ENCOUNTER — LAB (OUTPATIENT)
Dept: FAMILY MEDICINE CLINIC | Facility: CLINIC | Age: 30
End: 2025-06-09
Payer: COMMERCIAL

## 2025-06-09 VITALS
HEIGHT: 62 IN | WEIGHT: 261.4 LBS | TEMPERATURE: 97.8 F | OXYGEN SATURATION: 99 % | HEART RATE: 79 BPM | DIASTOLIC BLOOD PRESSURE: 88 MMHG | SYSTOLIC BLOOD PRESSURE: 132 MMHG | BODY MASS INDEX: 48.1 KG/M2 | RESPIRATION RATE: 15 BRPM

## 2025-06-09 DIAGNOSIS — Z00.00 WELL ADULT EXAM: Primary | ICD-10-CM

## 2025-06-09 DIAGNOSIS — Z00.00 WELL ADULT EXAM: ICD-10-CM

## 2025-06-09 LAB
25(OH)D3 SERPL-MCNC: 32.2 NG/ML (ref 30–100)
ALBUMIN SERPL-MCNC: 4.5 G/DL (ref 3.5–5.2)
ALBUMIN/GLOB SERPL: 1.5 G/DL
ALP SERPL-CCNC: 120 U/L (ref 39–117)
ALT SERPL W P-5'-P-CCNC: 23 U/L (ref 1–33)
ANION GAP SERPL CALCULATED.3IONS-SCNC: 11 MMOL/L (ref 5–15)
AST SERPL-CCNC: 22 U/L (ref 1–32)
BILIRUB SERPL-MCNC: 0.5 MG/DL (ref 0–1.2)
BUN SERPL-MCNC: 19 MG/DL (ref 6–20)
BUN/CREAT SERPL: 26 (ref 7–25)
CALCIUM SPEC-SCNC: 9.7 MG/DL (ref 8.6–10.5)
CHLORIDE SERPL-SCNC: 104 MMOL/L (ref 98–107)
CHOLEST SERPL-MCNC: 190 MG/DL (ref 0–200)
CO2 SERPL-SCNC: 23 MMOL/L (ref 22–29)
CREAT SERPL-MCNC: 0.73 MG/DL (ref 0.57–1)
CRP SERPL-MCNC: 0.42 MG/DL (ref 0–0.5)
DEPRECATED RDW RBC AUTO: 42.1 FL (ref 37–54)
EGFRCR SERPLBLD CKD-EPI 2021: 113.6 ML/MIN/1.73
ERYTHROCYTE [DISTWIDTH] IN BLOOD BY AUTOMATED COUNT: 12.9 % (ref 12.3–15.4)
GLOBULIN UR ELPH-MCNC: 3.1 GM/DL
GLUCOSE SERPL-MCNC: 104 MG/DL (ref 65–99)
HCT VFR BLD AUTO: 41 % (ref 34–46.6)
HDLC SERPL-MCNC: 41 MG/DL (ref 40–60)
HGB BLD-MCNC: 13.9 G/DL (ref 12–15.9)
LDLC SERPL CALC-MCNC: 125 MG/DL (ref 0–100)
LDLC/HDLC SERPL: 2.98 {RATIO}
MCH RBC QN AUTO: 30.5 PG (ref 26.6–33)
MCHC RBC AUTO-ENTMCNC: 33.9 G/DL (ref 31.5–35.7)
MCV RBC AUTO: 89.9 FL (ref 79–97)
PLATELET # BLD AUTO: 292 10*3/MM3 (ref 140–450)
PMV BLD AUTO: 10.4 FL (ref 6–12)
POTASSIUM SERPL-SCNC: 4.4 MMOL/L (ref 3.5–5.2)
PROT SERPL-MCNC: 7.6 G/DL (ref 6–8.5)
RBC # BLD AUTO: 4.56 10*6/MM3 (ref 3.77–5.28)
SODIUM SERPL-SCNC: 138 MMOL/L (ref 136–145)
TRIGL SERPL-MCNC: 135 MG/DL (ref 0–150)
TSH SERPL DL<=0.05 MIU/L-ACNC: 3.18 UIU/ML (ref 0.27–4.2)
VIT B12 BLD-MCNC: 450 PG/ML (ref 211–946)
VLDLC SERPL-MCNC: 24 MG/DL (ref 5–40)
WBC NRBC COR # BLD AUTO: 7.65 10*3/MM3 (ref 3.4–10.8)

## 2025-06-09 PROCEDURE — 99395 PREV VISIT EST AGE 18-39: CPT | Performed by: PHYSICIAN ASSISTANT

## 2025-06-09 PROCEDURE — 83036 HEMOGLOBIN GLYCOSYLATED A1C: CPT | Performed by: PHYSICIAN ASSISTANT

## 2025-06-09 PROCEDURE — 82607 VITAMIN B-12: CPT | Performed by: PHYSICIAN ASSISTANT

## 2025-06-09 PROCEDURE — 82306 VITAMIN D 25 HYDROXY: CPT | Performed by: PHYSICIAN ASSISTANT

## 2025-06-09 PROCEDURE — 36415 COLL VENOUS BLD VENIPUNCTURE: CPT | Performed by: FAMILY MEDICINE

## 2025-06-09 PROCEDURE — 1126F AMNT PAIN NOTED NONE PRSNT: CPT | Performed by: PHYSICIAN ASSISTANT

## 2025-06-09 PROCEDURE — 80061 LIPID PANEL: CPT | Performed by: PHYSICIAN ASSISTANT

## 2025-06-09 PROCEDURE — 2014F MENTAL STATUS ASSESS: CPT | Performed by: PHYSICIAN ASSISTANT

## 2025-06-09 PROCEDURE — 86140 C-REACTIVE PROTEIN: CPT | Performed by: PHYSICIAN ASSISTANT

## 2025-06-09 PROCEDURE — 80050 GENERAL HEALTH PANEL: CPT | Performed by: PHYSICIAN ASSISTANT

## 2025-06-09 NOTE — PROGRESS NOTES
Follow Up Office Visit      Date: 2025   Patient Name: Leona Murillo  : 1995   MRN: [id number]     Chief Complaint:    Chief Complaint   Patient presents with    Annual Exam     Liver checked, and cholesterol. NAFLD was spotted on last labs.         History of Present Illness: Leona Murillo is a 29 y.o. female who is here today for    History of Present Illness  The patient presents for a recheck on her liver.    She has been diagnosed with non-alcoholic fatty liver disease (NAFLD) following an ultrasound examination. She reports occasional twinges in her liver, a symptom that was not present until she began taking Tylenol for a tooth infection. This medication led to a significant increase in her liver enzymes, which subsequently decreased upon re-evaluation. An ultrasound conducted in 2023 revealed early signs of NAFLD. Despite attempts at weight loss through calorie and carbohydrate tracking, she has been unsuccessful. She is currently breastfeeding her 8-month-old child and has not menstruated since childbirth, although she experienced intermittent brown spotting that has since ceased. A pregnancy test was negative. She was advised to incorporate exercise into her routine and purchased a walking mat, but became pregnant shortly after in 2024 or 2024 and delivered in 2024. She is not currently on any medications.    She has noticed swelling in her legs and ankles postpartum, a symptom she did not experience prior to pregnancy. She has a history of polycystic ovary syndrome (PCOS) and endometriosis, which have previously caused weight fluctuations. She lost weight prior to her most recent pregnancy, but regained it during gestation and has since maintained a weight of 220 pounds. She is not planning for any more pregnancies. She has tried the Mirena and when she had that removed, she felt like she was having panic attacks, which she never had before. She checks her weight twice  "a day now, in the morning and at night.    Her cholesterol levels were previously abnormal, with high LDL and low HDL, and she has been monitoring her diet accordingly. She is fasting today and would like to have her A1c checked.    FAMILY HISTORY  Her father  at age 60 from a heart attack. There is a history of heart disease in her family.   .    Subjective      Review of Systems:   Review of Systems   Constitutional: Negative.    HENT: Negative.     Respiratory: Negative.     Cardiovascular: Negative.      I have reviewed the patients family history, social history, past medical history, past surgical history and have updated it as appropriate.     Medications:     Current Outpatient Medications:     FLUoxetine (PROzac) 20 MG capsule, 1 capsule Daily., Disp: , Rfl:     ibuprofen (ADVIL,MOTRIN) 800 MG tablet, Take 1 tablet by mouth Every 8 (Eight) Hours As Needed for Moderate Pain or Mild Pain (Take every 8 hours for 2-3 days and then as needed)., Disp: 60 tablet, Rfl: 1    Prenatal Vit-Fe Fumarate-FA (Prenatal ) 27-1 MG tablet tablet, Take 1 tablet by mouth Daily., Disp: , Rfl:     Allergies:   Allergies   Allergen Reactions    Cephalosporins Angioedema, Hives and Unknown - Low Severity    Latex Anaphylaxis and Hives    Pineapple Anaphylaxis    Sulfa Antibiotics Hives and Unknown - Low Severity    Erythromycin Hives     All mycin drugs    Cefdinir Dizziness    Ciprofloxacin Hives    Clindamycin/Lincomycin Hives, Nausea Only, Dizziness and Confusion       Objective     Physical Exam: Please see above  Vital Signs:   Vitals:    25 0944   BP: 132/88   BP Location: Right arm   Patient Position: Sitting   Cuff Size: Large Adult   Pulse: 79   Resp: 15   Temp: 97.8 °F (36.6 °C)   TempSrc: Temporal   SpO2: 99%   Weight: 119 kg (261 lb 6.4 oz)   Height: 157.5 cm (62\")   PainSc: 0-No pain     Body mass index is 47.81 kg/m².  Facility age limit for growth %per is 20 years.  Class 3 Severe Obesity (BMI >=40). " Obesity-related health conditions include the following: hepatic steatosis. Obesity is improving with lifestyle modifications. BMI is is above average; BMI management plan is completed. We discussed portion control and increasing exercise.       Physical Exam  Vitals and nursing note reviewed.   Constitutional:       General: She is not in acute distress.     Appearance: Normal appearance. She is not ill-appearing or toxic-appearing.   Cardiovascular:      Rate and Rhythm: Normal rate and regular rhythm.      Heart sounds: No murmur heard.     No friction rub. No gallop.   Pulmonary:      Effort: Pulmonary effort is normal. No respiratory distress.      Breath sounds: Normal breath sounds. No wheezing.   Neurological:      Mental Status: She is alert.     Procedures    Assessment / Plan      Assessment/Plan:   1. Well adult exam    - Reports difficulty losing weight despite tracking calories and carbohydrates.  - Experienced intermittent brown spotting but no regular menstrual period, likely due to breastfeeding.  - Occasional liver twinges and history of elevated liver enzymes, flagged during a previous check-up.  - Routine labs will be ordered to monitor liver function.  - CBC (No Diff); Future  - Comprehensive Metabolic Panel; Future  - C-reactive Protein; Future  - Hemoglobin A1c; Future  - Lipid Panel; Future  - TSH Rfx On Abnormal To Free T4; Future  - Vitamin B12; Future  - Vitamin D,25-Hydroxy; Future     Assessment & Plan  1. Non-alcoholic fatty liver disease (NAFLD).  - Reports difficulty losing weight despite tracking calories and carbohydrates.  - Experienced intermittent brown spotting but no regular menstrual period, likely due to breastfeeding.  - Occasional liver twinges and history of elevated liver enzymes, flagged during a previous check-up.  - Routine labs will be ordered to monitor liver function.    2. Postpartum leg and ankle swelling.  - Reports persistent leg and ankle swelling postpartum,  which she did not experience before pregnancy.  - Physical exam findings will include checking thyroid function to rule out any underlying issues.  - Routine labs will be ordered to assess thyroid function.  - Management will include monitoring for any changes in swelling and addressing underlying causes.    3. Polycystic ovary syndrome (PCOS).  - History of PCOS and endometriosis contributing to difficulty in losing weight.  - Elevated blood sugar levels could be related to PCOS.  - Routine labs will include long-term blood sugar control (A1c) to monitor her condition.  - Counseling provided on the impact of PCOS on weight management and overall health.    4. Cholesterol management.  - Concerned about cholesterol levels, previous tests showed high LDL and low HDL levels.  - Monitoring diet to manage cholesterol intake.  - Routine labs will include a lipid panel to assess current cholesterol levels.  - Counseling provided on dietary management and the importance of monitoring cholesterol levels.       Follow Up:   No follow-ups on file.      At [facility], we believe that sharing information builds trust and better relationships. You are receiving this note because you recently visited [facility]. It is possible you will see health information before a provider has talked with you about it. This kind of information can be easy to misunderstand. To help you fully understand what it means for your health, we urge you to discuss this note with your provider.    Patience Vazquez PA-C  [facility]

## 2025-06-10 LAB — HBA1C MFR BLD: 5.7 % (ref 4.8–5.6)

## 2025-08-21 ENCOUNTER — TELEPHONE (OUTPATIENT)
Dept: FAMILY MEDICINE CLINIC | Facility: CLINIC | Age: 30
End: 2025-08-21
Payer: COMMERCIAL